# Patient Record
Sex: MALE | Race: WHITE | NOT HISPANIC OR LATINO | Employment: FULL TIME | URBAN - NONMETROPOLITAN AREA
[De-identification: names, ages, dates, MRNs, and addresses within clinical notes are randomized per-mention and may not be internally consistent; named-entity substitution may affect disease eponyms.]

---

## 2017-02-02 ENCOUNTER — HOSPITAL ENCOUNTER (OUTPATIENT)
Dept: GENERAL RADIOLOGY | Facility: HOSPITAL | Age: 62
Discharge: HOME | End: 2017-02-02
Admitting: FAMILY MEDICINE

## 2017-02-02 ENCOUNTER — TRANSCRIBE ORDERS (OUTPATIENT)
Dept: ADMINISTRATIVE | Facility: HOSPITAL | Age: 62
End: 2017-02-02

## 2017-02-02 DIAGNOSIS — M54.5 LOW BACK PAIN, UNSPECIFIED BACK PAIN LATERALITY, UNSPECIFIED CHRONICITY, WITH SCIATICA PRESENCE UNSPECIFIED: ICD-10-CM

## 2017-02-02 DIAGNOSIS — M54.5 LOW BACK PAIN, UNSPECIFIED BACK PAIN LATERALITY, UNSPECIFIED CHRONICITY, WITH SCIATICA PRESENCE UNSPECIFIED: Primary | ICD-10-CM

## 2017-02-02 PROCEDURE — 72110 X-RAY EXAM L-2 SPINE 4/>VWS: CPT

## 2018-04-27 ENCOUNTER — APPOINTMENT (OUTPATIENT)
Dept: PREADMISSION TESTING | Facility: HOSPITAL | Age: 63
End: 2018-04-27

## 2018-04-30 ENCOUNTER — APPOINTMENT (OUTPATIENT)
Dept: PREADMISSION TESTING | Facility: HOSPITAL | Age: 63
End: 2018-04-30

## 2018-04-30 VITALS
OXYGEN SATURATION: 98 % | WEIGHT: 187.61 LBS | HEART RATE: 82 BPM | HEIGHT: 64 IN | SYSTOLIC BLOOD PRESSURE: 128 MMHG | DIASTOLIC BLOOD PRESSURE: 76 MMHG | BODY MASS INDEX: 32.03 KG/M2

## 2018-04-30 LAB
ALBUMIN SERPL-MCNC: 4.1 G/DL (ref 3.5–5)
ALBUMIN/GLOB SERPL: 1.4 G/DL (ref 1.1–2.5)
ALP SERPL-CCNC: 71 U/L (ref 24–120)
ALT SERPL W P-5'-P-CCNC: 40 U/L (ref 0–54)
ANION GAP SERPL CALCULATED.3IONS-SCNC: 11 MMOL/L (ref 4–13)
AST SERPL-CCNC: 26 U/L (ref 7–45)
BASOPHILS # BLD AUTO: 0.04 10*3/MM3 (ref 0–0.2)
BASOPHILS NFR BLD AUTO: 0.9 % (ref 0–2)
BILIRUB SERPL-MCNC: 0.5 MG/DL (ref 0.1–1)
BUN BLD-MCNC: 12 MG/DL (ref 5–21)
BUN/CREAT SERPL: 9.9 (ref 7–25)
CALCIUM SPEC-SCNC: 9.2 MG/DL (ref 8.4–10.4)
CHLORIDE SERPL-SCNC: 97 MMOL/L (ref 98–110)
CO2 SERPL-SCNC: 30 MMOL/L (ref 24–31)
CREAT BLD-MCNC: 1.21 MG/DL (ref 0.5–1.4)
DEPRECATED RDW RBC AUTO: 42.9 FL (ref 40–54)
EOSINOPHIL # BLD AUTO: 0.2 10*3/MM3 (ref 0–0.7)
EOSINOPHIL NFR BLD AUTO: 4.3 % (ref 0–4)
ERYTHROCYTE [DISTWIDTH] IN BLOOD BY AUTOMATED COUNT: 13.7 % (ref 12–15)
GFR SERPL CREATININE-BSD FRML MDRD: 61 ML/MIN/1.73
GLOBULIN UR ELPH-MCNC: 2.9 GM/DL
GLUCOSE BLD-MCNC: 97 MG/DL (ref 70–100)
HCT VFR BLD AUTO: 41.5 % (ref 40–52)
HGB BLD-MCNC: 13.9 G/DL (ref 14–18)
IMM GRANULOCYTES # BLD: 0.02 10*3/MM3 (ref 0–0.03)
IMM GRANULOCYTES NFR BLD: 0.4 % (ref 0–5)
LYMPHOCYTES # BLD AUTO: 1.05 10*3/MM3 (ref 0.72–4.86)
LYMPHOCYTES NFR BLD AUTO: 22.7 % (ref 15–45)
MCH RBC QN AUTO: 28.8 PG (ref 28–32)
MCHC RBC AUTO-ENTMCNC: 33.5 G/DL (ref 33–36)
MCV RBC AUTO: 85.9 FL (ref 82–95)
MONOCYTES # BLD AUTO: 0.46 10*3/MM3 (ref 0.19–1.3)
MONOCYTES NFR BLD AUTO: 10 % (ref 4–12)
NEUTROPHILS # BLD AUTO: 2.85 10*3/MM3 (ref 1.87–8.4)
NEUTROPHILS NFR BLD AUTO: 61.7 % (ref 39–78)
NRBC BLD MANUAL-RTO: 0 /100 WBC (ref 0–0)
PLATELET # BLD AUTO: 317 10*3/MM3 (ref 130–400)
PMV BLD AUTO: 9.5 FL (ref 6–12)
POTASSIUM BLD-SCNC: 3.8 MMOL/L (ref 3.5–5.3)
PROT SERPL-MCNC: 7 G/DL (ref 6.3–8.7)
RBC # BLD AUTO: 4.83 10*6/MM3 (ref 4.8–5.9)
SODIUM BLD-SCNC: 138 MMOL/L (ref 135–145)
WBC NRBC COR # BLD: 4.62 10*3/MM3 (ref 4.8–10.8)

## 2018-04-30 PROCEDURE — 80053 COMPREHEN METABOLIC PANEL: CPT | Performed by: SPECIALIST

## 2018-04-30 PROCEDURE — 93010 ELECTROCARDIOGRAM REPORT: CPT | Performed by: INTERNAL MEDICINE

## 2018-04-30 PROCEDURE — 36415 COLL VENOUS BLD VENIPUNCTURE: CPT

## 2018-04-30 PROCEDURE — 93005 ELECTROCARDIOGRAM TRACING: CPT

## 2018-04-30 PROCEDURE — 85025 COMPLETE CBC W/AUTO DIFF WBC: CPT | Performed by: SPECIALIST

## 2018-04-30 RX ORDER — TIZANIDINE 4 MG/1
4 TABLET ORAL EVERY 8 HOURS PRN
COMMUNITY
End: 2022-11-09

## 2018-04-30 RX ORDER — RANITIDINE 300 MG/1
300 TABLET ORAL NIGHTLY
Status: ON HOLD | COMMUNITY
End: 2022-08-15

## 2018-04-30 RX ORDER — SUCRALFATE 1 G/1
1 TABLET ORAL 4 TIMES DAILY
Status: ON HOLD | COMMUNITY
End: 2022-08-15

## 2018-04-30 RX ORDER — PANTOPRAZOLE SODIUM 40 MG/1
40 TABLET, DELAYED RELEASE ORAL EVERY MORNING
COMMUNITY

## 2018-05-04 ENCOUNTER — ANESTHESIA (OUTPATIENT)
Dept: PERIOP | Facility: HOSPITAL | Age: 63
End: 2018-05-04

## 2018-05-04 ENCOUNTER — ANESTHESIA EVENT (OUTPATIENT)
Dept: PERIOP | Facility: HOSPITAL | Age: 63
End: 2018-05-04

## 2018-05-04 ENCOUNTER — HOSPITAL ENCOUNTER (OUTPATIENT)
Facility: HOSPITAL | Age: 63
Setting detail: HOSPITAL OUTPATIENT SURGERY
Discharge: HOME | End: 2018-05-04
Attending: SPECIALIST | Admitting: SPECIALIST

## 2018-05-04 VITALS
TEMPERATURE: 96.9 F | DIASTOLIC BLOOD PRESSURE: 77 MMHG | OXYGEN SATURATION: 93 % | SYSTOLIC BLOOD PRESSURE: 121 MMHG | RESPIRATION RATE: 14 BRPM | HEART RATE: 52 BPM

## 2018-05-04 PROCEDURE — 25010000002 NEOSTIGMINE PER 0.5 MG: Performed by: NURSE ANESTHETIST, CERTIFIED REGISTERED

## 2018-05-04 PROCEDURE — 25010000002 FENTANYL CITRATE (PF) 100 MCG/2ML SOLUTION: Performed by: NURSE ANESTHETIST, CERTIFIED REGISTERED

## 2018-05-04 PROCEDURE — 25010000002 MIDAZOLAM PER 1 MG: Performed by: ANESTHESIOLOGY

## 2018-05-04 PROCEDURE — 25010000002 DEXAMETHASONE PER 1 MG: Performed by: ANESTHESIOLOGY

## 2018-05-04 PROCEDURE — C1781 MESH (IMPLANTABLE): HCPCS | Performed by: SPECIALIST

## 2018-05-04 PROCEDURE — 25010000002 DEXAMETHASONE PER 1 MG: Performed by: NURSE ANESTHETIST, CERTIFIED REGISTERED

## 2018-05-04 PROCEDURE — 25010000002 PROPOFOL 10 MG/ML EMULSION: Performed by: NURSE ANESTHETIST, CERTIFIED REGISTERED

## 2018-05-04 PROCEDURE — 25010000002 KETOROLAC TROMETHAMINE PER 15 MG: Performed by: NURSE ANESTHETIST, CERTIFIED REGISTERED

## 2018-05-04 PROCEDURE — 25010000002 VANCOMYCIN PER 500 MG: Performed by: SPECIALIST

## 2018-05-04 PROCEDURE — 25010000002 ONDANSETRON PER 1 MG: Performed by: NURSE ANESTHETIST, CERTIFIED REGISTERED

## 2018-05-04 DEVICE — BARD VENTRALEX HERNIA PATCH, 4.3 CM (1.7"), SMALL CIRCLE WITH STRAP
Type: IMPLANTABLE DEVICE | Status: FUNCTIONAL
Brand: VENTRALEX

## 2018-05-04 RX ORDER — MIDAZOLAM HYDROCHLORIDE 1 MG/ML
2 INJECTION INTRAMUSCULAR; INTRAVENOUS
Status: DISCONTINUED | OUTPATIENT
Start: 2018-05-04 | End: 2018-05-04 | Stop reason: HOSPADM

## 2018-05-04 RX ORDER — PROPOFOL 10 MG/ML
VIAL (ML) INTRAVENOUS AS NEEDED
Status: DISCONTINUED | OUTPATIENT
Start: 2018-05-04 | End: 2018-05-04 | Stop reason: SURG

## 2018-05-04 RX ORDER — ACETAMINOPHEN 500 MG
1000 TABLET ORAL ONCE
Status: COMPLETED | OUTPATIENT
Start: 2018-05-04 | End: 2018-05-04

## 2018-05-04 RX ORDER — SODIUM CHLORIDE, SODIUM LACTATE, POTASSIUM CHLORIDE, CALCIUM CHLORIDE 600; 310; 30; 20 MG/100ML; MG/100ML; MG/100ML; MG/100ML
100 INJECTION, SOLUTION INTRAVENOUS CONTINUOUS
Status: DISCONTINUED | OUTPATIENT
Start: 2018-05-04 | End: 2018-05-04 | Stop reason: HOSPADM

## 2018-05-04 RX ORDER — ONDANSETRON 2 MG/ML
INJECTION INTRAMUSCULAR; INTRAVENOUS AS NEEDED
Status: DISCONTINUED | OUTPATIENT
Start: 2018-05-04 | End: 2018-05-04 | Stop reason: SURG

## 2018-05-04 RX ORDER — KETOROLAC TROMETHAMINE 30 MG/ML
INJECTION, SOLUTION INTRAMUSCULAR; INTRAVENOUS AS NEEDED
Status: DISCONTINUED | OUTPATIENT
Start: 2018-05-04 | End: 2018-05-04 | Stop reason: SURG

## 2018-05-04 RX ORDER — HYDROCODONE BITARTRATE AND ACETAMINOPHEN 7.5; 325 MG/1; MG/1
1 TABLET ORAL ONCE AS NEEDED
Status: DISCONTINUED | OUTPATIENT
Start: 2018-05-04 | End: 2018-05-04 | Stop reason: HOSPADM

## 2018-05-04 RX ORDER — IPRATROPIUM BROMIDE AND ALBUTEROL SULFATE 2.5; .5 MG/3ML; MG/3ML
3 SOLUTION RESPIRATORY (INHALATION) ONCE AS NEEDED
Status: DISCONTINUED | OUTPATIENT
Start: 2018-05-04 | End: 2018-05-04 | Stop reason: HOSPADM

## 2018-05-04 RX ORDER — LIDOCAINE HYDROCHLORIDE 40 MG/ML
SOLUTION TOPICAL AS NEEDED
Status: DISCONTINUED | OUTPATIENT
Start: 2018-05-04 | End: 2018-05-04 | Stop reason: SURG

## 2018-05-04 RX ORDER — SODIUM CHLORIDE 0.9 % (FLUSH) 0.9 %
1-10 SYRINGE (ML) INJECTION AS NEEDED
Status: DISCONTINUED | OUTPATIENT
Start: 2018-05-04 | End: 2018-05-04 | Stop reason: HOSPADM

## 2018-05-04 RX ORDER — METOCLOPRAMIDE HYDROCHLORIDE 5 MG/ML
5 INJECTION INTRAMUSCULAR; INTRAVENOUS
Status: DISCONTINUED | OUTPATIENT
Start: 2018-05-04 | End: 2018-05-04 | Stop reason: HOSPADM

## 2018-05-04 RX ORDER — SODIUM CHLORIDE, SODIUM LACTATE, POTASSIUM CHLORIDE, CALCIUM CHLORIDE 600; 310; 30; 20 MG/100ML; MG/100ML; MG/100ML; MG/100ML
1000 INJECTION, SOLUTION INTRAVENOUS CONTINUOUS
Status: DISCONTINUED | OUTPATIENT
Start: 2018-05-04 | End: 2018-05-04 | Stop reason: HOSPADM

## 2018-05-04 RX ORDER — BUPIVACAINE HYDROCHLORIDE AND EPINEPHRINE 5; 5 MG/ML; UG/ML
INJECTION, SOLUTION PERINEURAL AS NEEDED
Status: DISCONTINUED | OUTPATIENT
Start: 2018-05-04 | End: 2018-05-04 | Stop reason: HOSPADM

## 2018-05-04 RX ORDER — NALOXONE HCL 0.4 MG/ML
0.4 VIAL (ML) INJECTION AS NEEDED
Status: DISCONTINUED | OUTPATIENT
Start: 2018-05-04 | End: 2018-05-04 | Stop reason: HOSPADM

## 2018-05-04 RX ORDER — ROCURONIUM BROMIDE 10 MG/ML
INJECTION, SOLUTION INTRAVENOUS AS NEEDED
Status: DISCONTINUED | OUTPATIENT
Start: 2018-05-04 | End: 2018-05-04 | Stop reason: SURG

## 2018-05-04 RX ORDER — LIDOCAINE HYDROCHLORIDE 20 MG/ML
INJECTION, SOLUTION INFILTRATION; PERINEURAL AS NEEDED
Status: DISCONTINUED | OUTPATIENT
Start: 2018-05-04 | End: 2018-05-04 | Stop reason: SURG

## 2018-05-04 RX ORDER — GLYCOPYRROLATE 0.2 MG/ML
INJECTION INTRAMUSCULAR; INTRAVENOUS AS NEEDED
Status: DISCONTINUED | OUTPATIENT
Start: 2018-05-04 | End: 2018-05-04 | Stop reason: SURG

## 2018-05-04 RX ORDER — IBUPROFEN 600 MG/1
600 TABLET ORAL ONCE AS NEEDED
Status: DISCONTINUED | OUTPATIENT
Start: 2018-05-04 | End: 2018-05-04 | Stop reason: HOSPADM

## 2018-05-04 RX ORDER — DEXAMETHASONE SODIUM PHOSPHATE 4 MG/ML
4 INJECTION, SOLUTION INTRA-ARTICULAR; INTRALESIONAL; INTRAMUSCULAR; INTRAVENOUS; SOFT TISSUE ONCE AS NEEDED
Status: COMPLETED | OUTPATIENT
Start: 2018-05-04 | End: 2018-05-04

## 2018-05-04 RX ORDER — MIDAZOLAM HYDROCHLORIDE 1 MG/ML
1 INJECTION INTRAMUSCULAR; INTRAVENOUS
Status: DISCONTINUED | OUTPATIENT
Start: 2018-05-04 | End: 2018-05-04 | Stop reason: HOSPADM

## 2018-05-04 RX ORDER — OXYCODONE AND ACETAMINOPHEN 7.5; 325 MG/1; MG/1
2 TABLET ORAL ONCE AS NEEDED
Status: COMPLETED | OUTPATIENT
Start: 2018-05-04 | End: 2018-05-04

## 2018-05-04 RX ORDER — MEPERIDINE HYDROCHLORIDE 25 MG/ML
12.5 INJECTION INTRAMUSCULAR; INTRAVENOUS; SUBCUTANEOUS
Status: DISCONTINUED | OUTPATIENT
Start: 2018-05-04 | End: 2018-05-04 | Stop reason: HOSPADM

## 2018-05-04 RX ORDER — HYDROCODONE BITARTRATE AND ACETAMINOPHEN 7.5; 325 MG/1; MG/1
1-2 TABLET ORAL EVERY 4 HOURS PRN
Qty: 30 TABLET | Refills: 0 | Status: SHIPPED | OUTPATIENT
Start: 2018-05-04 | End: 2022-11-09

## 2018-05-04 RX ORDER — SODIUM CHLORIDE 0.9 % (FLUSH) 0.9 %
3 SYRINGE (ML) INJECTION AS NEEDED
Status: DISCONTINUED | OUTPATIENT
Start: 2018-05-04 | End: 2018-05-04 | Stop reason: HOSPADM

## 2018-05-04 RX ORDER — FENTANYL CITRATE 50 UG/ML
INJECTION, SOLUTION INTRAMUSCULAR; INTRAVENOUS AS NEEDED
Status: DISCONTINUED | OUTPATIENT
Start: 2018-05-04 | End: 2018-05-04 | Stop reason: SURG

## 2018-05-04 RX ORDER — LABETALOL HYDROCHLORIDE 5 MG/ML
5 INJECTION, SOLUTION INTRAVENOUS
Status: DISCONTINUED | OUTPATIENT
Start: 2018-05-04 | End: 2018-05-04 | Stop reason: HOSPADM

## 2018-05-04 RX ORDER — ONDANSETRON 2 MG/ML
4 INJECTION INTRAMUSCULAR; INTRAVENOUS ONCE AS NEEDED
Status: DISCONTINUED | OUTPATIENT
Start: 2018-05-04 | End: 2018-05-04 | Stop reason: HOSPADM

## 2018-05-04 RX ORDER — OXYCODONE AND ACETAMINOPHEN 10; 325 MG/1; MG/1
1 TABLET ORAL ONCE AS NEEDED
Status: DISCONTINUED | OUTPATIENT
Start: 2018-05-04 | End: 2018-05-04 | Stop reason: HOSPADM

## 2018-05-04 RX ORDER — FENTANYL CITRATE 50 UG/ML
25 INJECTION, SOLUTION INTRAMUSCULAR; INTRAVENOUS AS NEEDED
Status: DISCONTINUED | OUTPATIENT
Start: 2018-05-04 | End: 2018-05-04 | Stop reason: HOSPADM

## 2018-05-04 RX ORDER — DEXAMETHASONE SODIUM PHOSPHATE 4 MG/ML
INJECTION, SOLUTION INTRA-ARTICULAR; INTRALESIONAL; INTRAMUSCULAR; INTRAVENOUS; SOFT TISSUE AS NEEDED
Status: DISCONTINUED | OUTPATIENT
Start: 2018-05-04 | End: 2018-05-04 | Stop reason: SURG

## 2018-05-04 RX ADMIN — DEXAMETHASONE SODIUM PHOSPHATE 4 MG: 4 INJECTION, SOLUTION INTRA-ARTICULAR; INTRALESIONAL; INTRAMUSCULAR; INTRAVENOUS; SOFT TISSUE at 14:41

## 2018-05-04 RX ADMIN — ACETAMINOPHEN 1000 MG: 500 TABLET, FILM COATED ORAL at 14:40

## 2018-05-04 RX ADMIN — PROPOFOL 200 MG: 10 INJECTION, EMULSION INTRAVENOUS at 15:08

## 2018-05-04 RX ADMIN — GLYCOPYRROLATE 0.4 MG: 0.2 INJECTION, SOLUTION INTRAMUSCULAR; INTRAVENOUS at 15:32

## 2018-05-04 RX ADMIN — LIDOCAINE HYDROCHLORIDE 0.5 ML: 10 INJECTION, SOLUTION EPIDURAL; INFILTRATION; INTRACAUDAL; PERINEURAL at 11:41

## 2018-05-04 RX ADMIN — ONDANSETRON HYDROCHLORIDE 4 MG: 2 SOLUTION INTRAMUSCULAR; INTRAVENOUS at 15:08

## 2018-05-04 RX ADMIN — FENTANYL CITRATE 100 MCG: 50 INJECTION, SOLUTION INTRAMUSCULAR; INTRAVENOUS at 15:08

## 2018-05-04 RX ADMIN — DEXAMETHASONE SODIUM PHOSPHATE 4 MG: 4 INJECTION, SOLUTION INTRAMUSCULAR; INTRAVENOUS at 15:08

## 2018-05-04 RX ADMIN — KETOROLAC TROMETHAMINE 30 MG: 30 INJECTION, SOLUTION INTRAMUSCULAR at 15:34

## 2018-05-04 RX ADMIN — OXYCODONE AND ACETAMINOPHEN 2 TABLET: 7.5; 325 TABLET ORAL at 16:05

## 2018-05-04 RX ADMIN — Medication 1 G: at 15:17

## 2018-05-04 RX ADMIN — LIDOCAINE HYDROCHLORIDE 50 MG: 20 INJECTION, SOLUTION INFILTRATION; PERINEURAL at 15:08

## 2018-05-04 RX ADMIN — SODIUM CHLORIDE, POTASSIUM CHLORIDE, SODIUM LACTATE AND CALCIUM CHLORIDE 1000 ML: 600; 310; 30; 20 INJECTION, SOLUTION INTRAVENOUS at 11:41

## 2018-05-04 RX ADMIN — MIDAZOLAM HYDROCHLORIDE 2 MG: 1 INJECTION, SOLUTION INTRAMUSCULAR; INTRAVENOUS at 14:41

## 2018-05-04 RX ADMIN — LIDOCAINE HYDROCHLORIDE 1 EACH: 40 SOLUTION TOPICAL at 15:10

## 2018-05-04 RX ADMIN — Medication 3 MG: at 15:32

## 2018-05-04 RX ADMIN — ROCURONIUM BROMIDE 30 MG: 10 INJECTION INTRAVENOUS at 15:08

## 2018-05-04 NOTE — OP NOTE
UMBILICAL HERNIA REPAIR  Procedure Note    Andi Keene  5/4/2018    Pre-op Diagnosis:   UMBILICAL HERNIA    Post-op Diagnosis:     same    Procedure/CPT® Codes:      Procedure(s):  OPEN UMBILICAL HERNIA REPAIR WITH MESH    Surgeon(s):  Augusta Sherman MD    Anesthesia: General    Staff:   Circulator: Humberto Keen RN  Scrub Person: Liz Paul  Assistant: Monie Nelson    Estimated Blood Loss: minimal    Specimens:                None      Drains:      Findings: small fascial defect    Complications: none    Description of Procedure:  The patient was brought to the operating room and placed in the supine position. After the induction of general anesthesia and infusion of IV antibiotics, the patient was prepped and draped in the usual sterile fashion. A semicircular incision was made around the umbilicus.  Subcutaneous tissue was dissected with electrocautery down to the fascia.  The umbilical stalk and hernia were circumferentially dissected and isolated.  The umbilical stalk and hernia was amputated at the level of the fascia.  The contents of the hernia were reduced.  The hernia sac was debrided from the umbilicus.  The mesh was soaked in antibiotic solution, positioned underneath the fascia, and the fascia was closed using interrupted 0 Prolene incorporating bites of the tabs of the mesh. Marcaine  was injected into the closed fascia.  The umbilical stalk was tacked to the closed fascia with interrupted 2-0 Vicryl.  The skin was reapproximated 2-0 Vicryl and running 4-0 Vicryl subcuticular. Dressings were placed.  The patient was transferred to the recovery room in stable condition having tolerated the procedure well.  At the end of the procedure all counts were correct.    Augusta Sherman MD     Date: 5/4/2018  Time: 3:40 PM

## 2018-05-04 NOTE — ANESTHESIA PREPROCEDURE EVALUATION
Anesthesia Evaluation     Patient summary reviewed   no history of anesthetic complications:  NPO Solid Status: > 8 hours  NPO Liquid Status: > 8 hours           Airway   Mallampati: I  TM distance: >3 FB  Neck ROM: full  No difficulty expected  Dental    (+) edentulous    Pulmonary - normal exam    breath sounds clear to auscultation  (-) asthma, recent URI, sleep apnea, not a smoker  Cardiovascular - normal exam  Exercise tolerance: good (4-7 METS)    ECG reviewed  Rhythm: regular  Rate: normal    (+) hypertension,   (-) pacemaker, past MI, angina, cardiac stents, CABG      Neuro/Psych  (-) seizures, TIA, CVA  GI/Hepatic/Renal/Endo    (+)  GERD,    (-) liver disease, no renal disease, diabetes, hypothyroidism, hyperthyroidism    Musculoskeletal     Abdominal    Substance History      OB/GYN          Other                        Anesthesia Plan    ASA 2     general     intravenous induction   Anesthetic plan and risks discussed with patient.

## 2018-05-04 NOTE — ANESTHESIA POSTPROCEDURE EVALUATION
Patient: Andi Keene    Procedure Summary     Date:  05/04/18 Room / Location:   [facility] /  [facility]    Anesthesia Start:  1507 Anesthesia Stop:  1552    Procedure:  OPEN UMBILICAL HERNIA REPAIR WITH MESH (N/A Abdomen) Diagnosis:  (UMBILICAL HERNIA)    Surgeon:  Augusta Sherman MD Provider:  Tra Crandall CRNA    Anesthesia Type:  general ASA Status:  2          Anesthesia Type: general  Last vitals  BP   116/81 (05/04/18 1615)   Temp   96.9 °F (36.1 °C) (05/04/18 1554)   Pulse   68 (05/04/18 1615)   Resp   13 (05/04/18 1615)     SpO2   92 % (05/04/18 1605)     Post Anesthesia Care and Evaluation    Patient location during evaluation: PACU  Patient participation: complete - patient participated  Level of consciousness: awake and alert  Pain management: adequate  Airway patency: patent  Anesthetic complications: No anesthetic complications  PONV Status: controlled  Cardiovascular status: acceptable and hemodynamically stable  Respiratory status: acceptable  Hydration status: acceptable    Comments: Patient discharged from PACU prior to anesthesia evaluation based on Neno Score.  For details, see RN note.     /81   Pulse 68   Temp 96.9 °F (36.1 °C) (Temporal Artery )   Resp 13   SpO2 92%

## 2018-05-04 NOTE — ANESTHESIA PROCEDURE NOTES
Airway  Urgency: elective    Airway not difficult    General Information and Staff    Patient location during procedure: OR  CRNA: JAME MOLINA    Indications and Patient Condition  Indications for airway management: airway protection    Preoxygenated: yes  MILS maintained throughout  Mask difficulty assessment: 1 - vent by mask    Final Airway Details  Final airway type: endotracheal airway      Successful airway: ETT  Cuffed: yes   Successful intubation technique: direct laryngoscopy  Endotracheal tube insertion site: oral  Blade: De La Garza  Blade size: #2  ETT size: 7.5 mm  Cormack-Lehane Classification: grade I - full view of glottis  Placement verified by: chest auscultation and capnometry   Cuff volume (mL): 6  Measured from: lips  ETT to lips (cm): 22  Number of attempts at approach: 1

## 2018-05-04 NOTE — DISCHARGE INSTRUCTIONS
YOUR NEXT PAIN MEDICATION IS DUE AT___8:00  PM___         General Anesthesia, Adult, Care After  Refer to this sheet in the next few weeks. These instructions provide you with information on caring for yourself after your procedure. Your health care provider may also give you more specific instructions. Your treatment has been planned according to current medical practices, but problems sometimes occur. Call your health care provider if you have any problems or questions after your procedure.  WHAT TO EXPECT AFTER THE PROCEDURE  After the procedure, it is typical to experience:  · Sleepiness.  · Nausea and vomiting.  HOME CARE INSTRUCTIONS  · For the first 24 hours after general anesthesia:  ¨ Have a responsible person with you.  ¨ Do not drive a car. If you are alone, do not take public transportation.  ¨ Do not drink alcohol.  ¨ Do not take medicine that has not been prescribed by your health care provider.  ¨ Do not sign important papers or make important decisions.  ¨ You may resume a normal diet and activities as directed by your health care provider.  · Change bandages (dressings) as directed.  · If you have questions or problems that seem related to general anesthesia, call the hospital and ask for the anesthetist or anesthesiologist on call.  SEEK MEDICAL CARE IF:  · You have nausea and vomiting that continue the day after anesthesia.  · You develop a rash.  SEEK IMMEDIATE MEDICAL CARE IF:    · You have difficulty breathing.  · You have chest pain.  · You have any allergic problems.     This information is not intended to replace advice given to you by your health care provider. Make sure you discuss any questions you have with your health care provider.     Document Released: 03/26/2002 Document Revised: 01/08/2016 Document Reviewed: 07/03/2014  [employer] Interactive Patient Education ©2016 [employer]    CALL YOUR PHYSICIAN IF YOU EXPERIENCE  INCREASED PAIN NOT HELPED BY YOUR PAIN MEDICATION.    .                                               Fall Prevention in the Home      Falls can cause injuries. They can happen to people of all ages. There are many things you can do to make your home safe and to help prevent falls.    WHAT CAN I DO ON THE OUTSIDE OF MY HOME?  · Regularly fix the edges of walkways and driveways and fix any cracks.  · Remove anything that might make you trip as you walk through a door, such as a raised step or threshold.  · Trim any bushes or trees on the path to your home.  · Use bright outdoor lighting.  · Clear any walking paths of anything that might make someone trip, such as rocks or tools.  · Regularly check to see if handrails are loose or broken. Make sure that both sides of any steps have handrails.  · Any raised decks and porches should have guardrails on the edges.  · Have any leaves, snow, or ice cleared regularly.  · Use sand or salt on walking paths during winter.  · Clean up any spills in your garage right away. This includes oil or grease spills.  WHAT CAN I DO IN THE BATHROOM?    · Use night lights.  · Install grab bars by the toilet and in the tub and shower. Do not use towel bars as grab bars.  · Use non-skid mats or decals in the tub or shower.  · If you need to sit down in the shower, use a plastic, non-slip stool.  · Keep the floor dry. Clean up any water that spills on the floor as soon as it happens.  · Remove soap buildup in the tub or shower regularly.  · Attach bath mats securely with double-sided non-slip rug tape.  · Do not have throw rugs and other things on the floor that can make you trip.  WHAT CAN I DO IN THE BEDROOM?  · Use night lights.  · Make sure that you have a light by your bed that is easy to reach.  · Do not use any sheets or blankets that are too big for your bed. They should not hang down onto the floor.  · Have a firm chair that has side arms. You can use this for support while you get dressed.  · Do not have throw rugs and other things on the floor  that can make you trip.  WHAT CAN I DO IN THE KITCHEN?  · Clean up any spills right away.  · Avoid walking on wet floors.  · Keep items that you use a lot in easy-to-reach places.  · If you need to reach something above you, use a strong step stool that has a grab bar.  · Keep electrical cords out of the way.  · Do not use floor polish or wax that makes floors slippery. If you must use wax, use non-skid floor wax.  · Do not have throw rugs and other things on the floor that can make you trip.  WHAT CAN I DO WITH MY STAIRS?  · Do not leave any items on the stairs.  · Make sure that there are handrails on both sides of the stairs and use them. Fix handrails that are broken or loose. Make sure that handrails are as long as the stairways.  · Check any carpeting to make sure that it is firmly attached to the stairs. Fix any carpet that is loose or worn.  · Avoid having throw rugs at the top or bottom of the stairs. If you do have throw rugs, attach them to the floor with carpet tape.  · Make sure that you have a light switch at the top of the stairs and the bottom of the stairs. If you do not have them, ask someone to add them for you.  WHAT ELSE CAN I DO TO HELP PREVENT FALLS?  · Wear shoes that:  ¨ Do not have high heels.  ¨ Have rubber bottoms.  ¨ Are comfortable and fit you well.  ¨ Are closed at the toe. Do not wear sandals.  · If you use a stepladder:  ¨ Make sure that it is fully opened. Do not climb a closed stepladder.  ¨ Make sure that both sides of the stepladder are locked into place.  ¨ Ask someone to hold it for you, if possible.  · Clearly audrey and make sure that you can see:  ¨ Any grab bars or handrails.  ¨ First and last steps.  ¨ Where the edge of each step is.  · Use tools that help you move around (mobility aids) if they are needed. These include:  ¨ Canes.  ¨ Walkers.  ¨ Scooters.  ¨ Crutches.  · Turn on the lights when you go into a dark area. Replace any light bulbs as soon as they burn  out.  · Set up your furniture so you have a clear path. Avoid moving your furniture around.  · If any of your floors are uneven, fix them.  · If there are any pets around you, be aware of where they are.  · Review your medicines with your doctor. Some medicines can make you feel dizzy. This can increase your chance of falling.  Ask your doctor what other things that you can do to help prevent falls.     This information is not intended to replace advice given to you by your health care provider. Make sure you discuss any questions you have with your health care provider.     Document Released: 10/14/2010 Document Revised: 05/03/2016 Document Reviewed: 01/22/2016  [employer] Interactive Patient Education ©2016 [employer]     PATIENT/FAMILY/RESPONSIBLE PARTY VERBALIZES UNDERSTANDING OF ABOVE EDUCATION.  COPY OF PAIN SCALE GIVEN AND REVIEWED WITH VERBALIZED UNDERSTANDING.

## 2018-05-05 ENCOUNTER — HOSPITAL ENCOUNTER (EMERGENCY)
Facility: HOSPITAL | Age: 63
Discharge: HOME | End: 2018-05-05
Attending: EMERGENCY MEDICINE

## 2018-05-05 VITALS
BODY MASS INDEX: 32.44 KG/M2 | SYSTOLIC BLOOD PRESSURE: 123 MMHG | RESPIRATION RATE: 14 BRPM | TEMPERATURE: 97.1 F | HEIGHT: 64 IN | DIASTOLIC BLOOD PRESSURE: 65 MMHG | OXYGEN SATURATION: 100 % | WEIGHT: 190 LBS | HEART RATE: 50 BPM

## 2018-05-05 DIAGNOSIS — Z87.19 S/P LAPAROSCOPIC HERNIA REPAIR: ICD-10-CM

## 2018-05-05 DIAGNOSIS — G89.18 POST-OP PAIN: Primary | ICD-10-CM

## 2018-05-05 DIAGNOSIS — Z98.890 S/P LAPAROSCOPIC HERNIA REPAIR: ICD-10-CM

## 2018-05-05 NOTE — DISCHARGE INSTRUCTIONS
Pain Scale Information, Adult  A pain scale is a tool to help you describe your pain. A pain scale often uses pictures, numbers, or words. It can help you explain to your health care provider:  · What your pain feels like, such as dull, achy, throbbing, or sharp.  · Where pain is located in your body.  · How often you have pain.  Pain scales range from simple to complex. Which pain scale your health care provider uses depends on your condition. Some pain scales only measure pain intensity. These can be useful if the cause of your pain is known. Other pain scales measure more factors, including if you are able to do your usual activities and how the pain is affecting your mood. These scales are useful if you have long-term (chronic) pain.  How is a pain scale used?  A pain scale may be used in your health care provider's office or in the hospital. Pain scales for adults are usually in the form of a survey. Your health care provider will ask you the questions on the pain scale or have you fill out a form.  Your health care provider may also give you a pain scale to use at home. If you have chronic pain, you may use a pain scale for several weeks or months. Keeping a record of your pain symptoms helps your health care provider see how your pain changes over time. Your health care provider can use a pain scale rating to guide your treatment plan.  Why is it important to communicate about pain?  Being in pain can make you feel unwell and have negative feelings. It can interfere with your daily activities, such as work, school, hobbies, or relationships. Pain can be a sign you have a condition that needs to be treated. A pain scale can help you describe your pain so your health care provider has a better idea of what you are feeling and how to treat your condition.  What are some questions to ask my health care provider?  · How accurate are the results of this pain scale?  · How often should I use a pain  scale?  · What is causing my pain?  · How long will I need treatment?  · What are the risks of treatment with medicines?  · What other treatments can help?  · What if my pain does not go away with treatment?  · Should I keep a record of pain symptoms or pain scale results at home?  This information is not intended to replace advice given to you by your health care provider. Make sure you discuss any questions you have with your health care provider.  Document Released: 01/01/2017 Document Revised: 05/25/2017 Document Reviewed: 01/01/2017  [employer] Interactive Patient Education © 2017 [employer]  Nothing strenuous.  Follow-up with surgeon.  If symptoms increase or surgeries change return back to the ER.

## 2018-05-05 NOTE — ED PROVIDER NOTES
Subjective   62-year-old gentleman who presents to our facility via private vehicle with a complaint of bleeding at the surgical site.  Yesterday he underwent a laparoscopic hernia repair of the abdominal wall here at our hospital by Dr. white.  He states that after going home he noted moderate amount of blood on the dressing and this morning he noticed that the dressing was soaked with blood.  There is no nausea no fever good appetite he eat breakfast this morning before coming into the emergency room no change in his urine or bowel habits.  No chest pain or shortness of breath no significant abdominal pain.            History provided by:  Patient  History limited by:  Acuity of condition   used: No    Abdominal Cramping   Pain location:  Generalized  Pain quality: cramping    Pain severity:  Severe  Onset quality:  Sudden  Timing:  Constant  Progression:  Worsening  Chronicity:  Chronic  Context: alcohol use    Risk factors: alcohol abuse        Review of Systems   Constitutional: Positive for activity change.   HENT: Negative.    Eyes: Negative.    Respiratory: Negative.    Cardiovascular: Negative.    Gastrointestinal: Positive for abdominal pain.        The patient had complaint of bleeding to surgical site with cramping.   Endocrine: Negative.    Genitourinary: Negative.    Musculoskeletal: Negative.    Skin: Negative.    Allergic/Immunologic: Negative.    Neurological: Negative.    Hematological: Negative.    Psychiatric/Behavioral: Negative.        Past Medical History:   Diagnosis Date   • Anemia    • Diverticulosis    • GERD (gastroesophageal reflux disease)    • Hypertension    • Lumbar back pain        No Known Allergies    Past Surgical History:   Procedure Laterality Date   • COLONOSCOPY N/A 11/15/2016    Procedure: COLONOSCOPY WITH ANESTHESIA;  Surgeon: Orestes Sams DO;  Location: [facility] ENDOSCOPY;  Service:    • ENDOSCOPY N/A 11/14/2016    Procedure:  ESOPHAGOGASTRODUODENOSCOPY WITH ANESTHESIA;  Surgeon: Orestes Sams DO;  Location: [facility] ENDOSCOPY;  Service:    • HERNIA REPAIR     • REPLACEMENT TOTAL KNEE     • TONSILLECTOMY     • UMBILICAL HERNIA REPAIR N/A 5/4/2018    Procedure: OPEN UMBILICAL HERNIA REPAIR WITH MESH;  Surgeon: Augusta Sherman MD;  Location: [facility] OR;  Service: General       Family History   Problem Relation Age of Onset   • No Known Problems Mother    • Heart disease Father        Social History     Social History   • Marital status:      Social History Main Topics   • Smoking status: Former Smoker   • Smokeless tobacco: Never Used   • Alcohol use 0.6 oz/week     1 Cans of beer per week      Comment: every other day   • Drug use: No   • Sexual activity: Defer     Other Topics Concern   • Not on file           Objective   Physical Exam   Constitutional: He is oriented to person, place, and time. He appears well-developed and well-nourished.   HENT:   Head: Normocephalic and atraumatic.   Right Ear: External ear normal.   Left Ear: External ear normal.   Nose: Nose normal.   Mouth/Throat: Oropharynx is clear and moist.   Eyes: Conjunctivae and EOM are normal. Pupils are equal, round, and reactive to light.   Neck: Normal range of motion. Neck supple.   Cardiovascular: Normal rate, regular rhythm, normal heart sounds and intact distal pulses.    Pulmonary/Chest: Effort normal and breath sounds normal.   Abdominal: A hernia is present.   Genitourinary: Rectum normal, prostate normal and penis normal.   Musculoskeletal: Normal range of motion.   Neurological: He is alert and oriented to person, place, and time. He has normal reflexes.   Skin: Skin is warm and dry.   Which was site in the mid abdominal area inferior to the bellybutton there is a surgical dressing with Steri-Strips which I have not altered.  The cause which was a 4 x 4 over this area appears to be blood soaked.  There is no other drainage or discharge noted.    Psychiatric: He has a normal mood and affect. His behavior is normal. Judgment and thought content normal.   Nursing note and vitals reviewed.      Procedures           ED Course      Andi Keene #[id number]  (62 y.o. M) 45       Lab Results     None   Imaging Results     None   ECG Results     None             MDM  Number of Diagnoses or Management Options  Post-op pain: established and improving  S/P laparoscopic hernia repair: established and improving  Risk of Complications, Morbidity, and/or Mortality  Presenting problems: moderate  Diagnostic procedures: moderate  Management options: moderate    Critical Care  Total time providing critical care: 30-74 minutes    Patient Progress  Patient progress: stable        Final diagnoses:   Post-op pain   S/P laparoscopic hernia repair            José Antonio MCKEON MD  06/07/18 3097

## 2018-07-05 ENCOUNTER — HOSPITAL ENCOUNTER (EMERGENCY)
Facility: HOSPITAL | Age: 63
Discharge: HOME | End: 2018-07-05
Admitting: EMERGENCY MEDICINE

## 2018-07-05 ENCOUNTER — APPOINTMENT (OUTPATIENT)
Dept: GENERAL RADIOLOGY | Facility: HOSPITAL | Age: 63
End: 2018-07-05

## 2018-07-05 VITALS
HEIGHT: 66 IN | TEMPERATURE: 98.3 F | SYSTOLIC BLOOD PRESSURE: 127 MMHG | BODY MASS INDEX: 29.89 KG/M2 | RESPIRATION RATE: 18 BRPM | DIASTOLIC BLOOD PRESSURE: 88 MMHG | HEART RATE: 77 BPM | WEIGHT: 186 LBS | OXYGEN SATURATION: 99 %

## 2018-07-05 DIAGNOSIS — S61.211A LACERATION OF LEFT INDEX FINGER WITHOUT FOREIGN BODY WITHOUT DAMAGE TO NAIL, INITIAL ENCOUNTER: Primary | ICD-10-CM

## 2018-07-05 PROCEDURE — 73130 X-RAY EXAM OF HAND: CPT

## 2018-07-05 PROCEDURE — 80307 DRUG TEST PRSMV CHEM ANLYZR: CPT

## 2018-07-05 PROCEDURE — 99283 EMERGENCY DEPT VISIT LOW MDM: CPT

## 2018-07-05 RX ORDER — LIDOCAINE HYDROCHLORIDE 10 MG/ML
10 INJECTION, SOLUTION INFILTRATION; PERINEURAL ONCE
Status: DISCONTINUED | OUTPATIENT
Start: 2018-07-05 | End: 2018-07-05 | Stop reason: HOSPADM

## 2018-07-05 RX ADMIN — LIDOCAINE HYDROCHLORIDE: 20 JELLY TOPICAL at 14:23

## 2018-07-05 NOTE — DISCHARGE INSTRUCTIONS
Keep wound clean and dry  F/u with PCP in 7 days or return to the ER to have sutures removed  Return to the ER as needed    Laceration Care, Adult  A laceration is a cut that goes through all of the layers of the skin and into the tissue that is right under the skin. Some lacerations heal on their own. Others need to be closed with stitches (sutures), staples, skin adhesive strips, or skin glue. Proper laceration care minimizes the risk of infection and helps the laceration to heal better.  How is this treated?  If sutures or staples were used:  · Keep the wound clean and dry.  · If you were given a bandage (dressing), you should change it at least one time per day or as told by your health care provider. You should also change it if it becomes wet or dirty.  · Keep the wound completely dry for the first 24 hours or as told by your health care provider. After that time, you may shower or bathe. However, make sure that the wound is not soaked in water until after the sutures or staples have been removed.  · Clean the wound one time each day or as told by your health care provider:  ? Wash the wound with soap and water.  ? Rinse the wound with water to remove all soap.  ? Pat the wound dry with a clean towel. Do not rub the wound.  · After cleaning the wound, apply a thin layer of antibiotic ointment as told by your health care provider. This will help to prevent infection and keep the dressing from sticking to the wound.  · Have the sutures or staples removed as told by your health care provider.  If skin adhesive strips were used:  · Keep the wound clean and dry.  · If you were given a bandage (dressing), you should change it at least one time per day or as told by your health care provider. You should also change it if it becomes dirty or wet.  · Do not get the skin adhesive strips wet. You may shower or bathe, but be careful to keep the wound dry.  · If the wound gets wet, pat it dry with a clean towel. Do not rub  the wound.  · Skin adhesive strips fall off on their own. You may trim the strips as the wound heals. Do not remove skin adhesive strips that are still stuck to the wound. They will fall off in time.  If skin glue was used:  · Try to keep the wound dry, but you may briefly wet it in the shower or bath. Do not soak the wound in water, such as by swimming.  · After you have showered or bathed, gently pat the wound dry with a clean towel. Do not rub the wound.  · Do not do any activities that will make you sweat heavily until the skin glue has fallen off on its own.  · Do not apply liquid, cream, or ointment medicine to the wound while the skin glue is in place. Using those may loosen the film before the wound has healed.  · If you were given a bandage (dressing), you should change it at least one time per day or as told by your health care provider. You should also change it if it becomes dirty or wet.  · If a dressing is placed over the wound, be careful not to apply tape directly over the skin glue. Doing that may cause the glue to be pulled off before the wound has healed.  · Do not pick at the glue. The skin glue usually remains in place for 5-10 days, then it falls off of the skin.  General Instructions  · Take over-the-counter and prescription medicines only as told by your health care provider.  · If you were prescribed an antibiotic medicine or ointment, take or apply it as told by your doctor. Do not stop using it even if your condition improves.  · To help prevent scarring, make sure to cover your wound with sunscreen whenever you are outside after stitches are removed, after adhesive strips are removed, or when glue remains in place and the wound is healed. Make sure to wear a sunscreen of at least 30 SPF.  · Do not scratch or pick at the wound.  · Keep all follow-up visits as told by your health care provider. This is important.  · Check your wound every day for signs of infection. Watch for:  ? Redness,  swelling, or pain.  ? Fluid, blood, or pus.  · Raise (elevate) the injured area above the level of your heart while you are sitting or lying down, if possible.  Contact a health care provider if:  · You received a tetanus shot and you have swelling, severe pain, redness, or bleeding at the injection site.  · You have a fever.  · A wound that was closed breaks open.  · You notice a bad smell coming from your wound or your dressing.  · You notice something coming out of the wound, such as wood or glass.  · Your pain is not controlled with medicine.  · You have increased redness, swelling, or pain at the site of your wound.  · You have fluid, blood, or pus coming from your wound.  · You notice a change in the color of your skin near your wound.  · You need to change the dressing frequently due to fluid, blood, or pus draining from the wound.  · You develop a new rash.  · You develop numbness around the wound.  Get help right away if:  · You develop severe swelling around the wound.  · Your pain suddenly increases and is severe.  · You develop painful lumps near the wound or on skin that is anywhere on your body.  · You have a red streak going away from your wound.  · The wound is on your hand or foot and you cannot properly move a finger or toe.  · The wound is on your hand or foot and you notice that your fingers or toes look pale or bluish.  This information is not intended to replace advice given to you by your health care provider. Make sure you discuss any questions you have with your health care provider.  Document Released: 12/18/2006 Document Revised: 05/19/2017 Document Reviewed: 12/14/2015  [employer] Patient Education © 2018 [employer]

## 2018-07-06 ENCOUNTER — LAB REQUISITION (OUTPATIENT)
Dept: LAB | Facility: HOSPITAL | Age: 63
End: 2018-07-06

## 2018-07-06 DIAGNOSIS — Z00.00 ROUTINE GENERAL MEDICAL EXAMINATION AT A HEALTH CARE FACILITY: ICD-10-CM

## 2018-07-12 ENCOUNTER — HOSPITAL ENCOUNTER (EMERGENCY)
Facility: HOSPITAL | Age: 63
Discharge: HOME | End: 2018-07-12
Admitting: EMERGENCY MEDICINE

## 2018-07-12 VITALS
HEIGHT: 67 IN | OXYGEN SATURATION: 97 % | TEMPERATURE: 99 F | HEART RATE: 95 BPM | WEIGHT: 185 LBS | SYSTOLIC BLOOD PRESSURE: 102 MMHG | BODY MASS INDEX: 29.03 KG/M2 | RESPIRATION RATE: 18 BRPM | DIASTOLIC BLOOD PRESSURE: 53 MMHG

## 2018-07-12 DIAGNOSIS — Z48.02 VISIT FOR SUTURE REMOVAL: Primary | ICD-10-CM

## 2018-07-12 PROCEDURE — 99201: CPT

## 2018-07-12 NOTE — ED PROVIDER NOTES
Subjective   62-year-old presents to the emergency department for suture removal of his left thumb.  Patient had Lasix left thumb one week ago.  Patient denies any competitions.  No fever, no chills, no signs of infection per patient patient reports no exacerbating or alleviating factors at this time.            Review of Systems   Constitutional: Negative for chills, diaphoresis, fatigue and fever.   HENT: Negative for congestion and trouble swallowing.    Respiratory: Negative for shortness of breath and wheezing.    Cardiovascular: Negative for chest pain and palpitations.   Gastrointestinal: Negative for abdominal pain, diarrhea and nausea.   Genitourinary: Negative for dysuria.   Musculoskeletal: Negative for arthralgias and myalgias.   Skin: Positive for wound (suture removal left thumb).   Neurological: Negative for dizziness and numbness.   Hematological: Negative for adenopathy. Does not bruise/bleed easily.       Past Medical History:   Diagnosis Date   • Anemia    • Diverticulosis    • GERD (gastroesophageal reflux disease)    • Hypertension    • Lumbar back pain        No Known Allergies    Past Surgical History:   Procedure Laterality Date   • COLONOSCOPY N/A 11/15/2016    Procedure: COLONOSCOPY WITH ANESTHESIA;  Surgeon: Orestes Sams DO;  Location: [facility] ENDOSCOPY;  Service:    • ENDOSCOPY N/A 11/14/2016    Procedure: ESOPHAGOGASTRODUODENOSCOPY WITH ANESTHESIA;  Surgeon: Orestes Sams DO;  Location: [facility] ENDOSCOPY;  Service:    • HERNIA REPAIR     • REPLACEMENT TOTAL KNEE     • TONSILLECTOMY     • UMBILICAL HERNIA REPAIR N/A 5/4/2018    Procedure: OPEN UMBILICAL HERNIA REPAIR WITH MESH;  Surgeon: Augusta Sherman MD;  Location: [facility] OR;  Service: General       Family History   Problem Relation Age of Onset   • No Known Problems Mother    • Heart disease Father        Social History     Social History   • Marital status:      Social History Main Topics   • Smoking status: Former  "Smoker   • Smokeless tobacco: Never Used   • Alcohol use 0.6 oz/week     1 Cans of beer per week      Comment: every other day   • Drug use: No   • Sexual activity: Defer     Other Topics Concern   • Not on file       Lab Results (last 24 hours)     ** No results found for the last 24 hours. **          Objective   Physical Exam   Constitutional: He is oriented to person, place, and time. He appears well-developed and well-nourished.   HENT:   Head: Normocephalic and atraumatic.   Cardiovascular: Normal rate and regular rhythm.    Pulmonary/Chest: Effort normal and breath sounds normal.   Abdominal: Soft. Bowel sounds are normal.   Musculoskeletal: Normal range of motion.   Neurological: He is oriented to person, place, and time.   Skin: Skin is warm and dry. Capillary refill takes less than 2 seconds.        3 sutures on left proximal thumb.  No signs of infection, incision healing well.   Psychiatric: His behavior is normal.   Nursing note and vitals reviewed.      Procedures         No orders to display       /53 (Patient Position: Sitting)   Pulse 95   Temp 99 °F (37.2 °C) (Temporal Artery )   Resp 18   Ht 170.2 cm (67\")   Wt 83.9 kg (185 lb)   SpO2 97%   BMI 28.98 kg/m²     ED Course         Medications - No data to display         MDM  Number of Diagnoses or Management Options  Diagnosis management comments: Encounter for suture removal.  3 sutures removed.  Patient tolerated procedure well.    Risk of Complications, Morbidity, and/or Mortality  Presenting problems: minimal  Diagnostic procedures: minimal  Management options: minimal    Patient Progress  Patient progress: improved      Final diagnoses:   Visit for suture removal          Devon Sahu PA-C  07/12/18 1555    "

## 2018-07-12 NOTE — DISCHARGE INSTRUCTIONS
Return to ER for any new, worsening or other concerning symptoms.  Otherwise follow with her primary care provider as needed.

## 2018-07-30 NOTE — ED PROVIDER NOTES
Subjective     History provided by:  Patient   used: No    Laceration   Location:  Finger  Finger laceration location:  L thumb  Length:  1  Depth:  Cutaneous  Quality: straight    Time since incident:  1 hour  Laceration mechanism:  Knife  Pain details:     Quality:  Aching    Severity:  Mild    Timing:  Constant    Progression:  Unchanged  Relieved by:  Nothing  Worsened by:  Nothing  Ineffective treatments:  None tried  Tetanus status:  Up to date  Associated symptoms: no fever, no focal weakness, no numbness, no rash, no redness, no swelling and no streaking        Review of Systems   Constitutional: Negative for fever.   Skin: Positive for wound. Negative for rash.   Neurological: Negative for focal weakness.   All other systems reviewed and are negative.      Past Medical History:   Diagnosis Date   • Anemia    • Diverticulosis    • GERD (gastroesophageal reflux disease)    • Hypertension    • Lumbar back pain        No Known Allergies    Past Surgical History:   Procedure Laterality Date   • COLONOSCOPY N/A 11/15/2016    Procedure: COLONOSCOPY WITH ANESTHESIA;  Surgeon: Orestes Sams DO;  Location: [facility] ENDOSCOPY;  Service:    • ENDOSCOPY N/A 11/14/2016    Procedure: ESOPHAGOGASTRODUODENOSCOPY WITH ANESTHESIA;  Surgeon: Orestes Sams DO;  Location: [facility] ENDOSCOPY;  Service:    • HERNIA REPAIR     • REPLACEMENT TOTAL KNEE     • TONSILLECTOMY     • UMBILICAL HERNIA REPAIR N/A 5/4/2018    Procedure: OPEN UMBILICAL HERNIA REPAIR WITH MESH;  Surgeon: Augusta Sherman MD;  Location: [facility] OR;  Service: General       Family History   Problem Relation Age of Onset   • No Known Problems Mother    • Heart disease Father        Social History     Social History   • Marital status:      Social History Main Topics   • Smoking status: Former Smoker   • Smokeless tobacco: Never Used   • Alcohol use 0.6 oz/week     1 Cans of beer per week      Comment: every other day   • Drug use: No      • Sexual activity: Defer     Other Topics Concern   • Not on file           Objective   Physical Exam   Constitutional: He is oriented to person, place, and time. He appears well-developed and well-nourished.   Cardiovascular: Normal rate.    Pulmonary/Chest: Effort normal.   Musculoskeletal:        Arms:  Neurological: He is alert and oriented to person, place, and time.   Skin: Skin is warm and dry. Capillary refill takes less than 2 seconds.   Psychiatric: He has a normal mood and affect.   Nursing note and vitals reviewed.      Laceration Repair  Date/Time: 7/30/2018 7:21 AM  Performed by: KYLE DE LA CRUZ  Authorized by: KYLE DE LA CRUZ     Consent:     Consent obtained:  Verbal    Consent given by:  Patient    Risks discussed:  Infection, need for additional repair, nerve damage, pain, poor cosmetic result, poor wound healing, retained foreign body, tendon damage and vascular damage    Alternatives discussed:  No treatment  Anesthesia (see MAR for exact dosages):     Anesthesia method:  Topical application and local infiltration    Topical anesthetic:  Lidocaine gel    Local anesthetic:  Lidocaine 1% w/o epi  Laceration details:     Location:  Finger    Finger location:  L thumb    Length (cm):  1  Repair type:     Repair type:  Simple  Pre-procedure details:     Preparation:  Patient was prepped and draped in usual sterile fashion and imaging obtained to evaluate for foreign bodies  Exploration:     Wound exploration: wound explored through full range of motion and entire depth of wound probed and visualized      Wound extent: no areolar tissue violation noted, no fascia violation noted, no foreign bodies/material noted, no muscle damage noted, no nerve damage noted, no tendon damage noted, no underlying fracture noted and no vascular damage noted      Contaminated: no    Treatment:     Area cleansed with:  Hibiclens and saline    Amount of cleaning:  Standard  Skin repair:     Repair method:   Sutures    Suture size:  5-0    Suture material:  Nylon    Number of sutures:  4  Approximation:     Approximation:  Loose  Post-procedure details:     Dressing:  Antibiotic ointment and bulky dressing    Patient tolerance of procedure:  Tolerated well, no immediate complications               ED Course  ED Course as of Jul 30 0725 Mon Jul 30, 2018   0722 Lac repair completed; pt piotr well. Pt advised to keep wound clean and dry, have sutures removed in 5-7 days, return to the ER as needed. Pt will be DC home in stable cond.  [LF]      ED Course User Index  [LF] SONAM Wilde        XR Hand 3+ View Left   Final Result   No acute findings.       This report was finalized on 07/05/2018 14:47 by Dr. Rober Duvall MD.                  MDM  Number of Diagnoses or Management Options  Laceration of left index finger without foreign body without damage to nail, initial encounter: new and requires workup     Amount and/or Complexity of Data Reviewed  Tests in the radiology section of CPT®: ordered and reviewed    Patient Progress  Patient progress: stable        Final diagnoses:   Laceration of left index finger without foreign body without damage to nail, initial encounter            SONAM Wilde  07/30/18 6011

## 2022-08-11 ENCOUNTER — OFFICE VISIT (OUTPATIENT)
Dept: GASTROENTEROLOGY | Facility: CLINIC | Age: 67
End: 2022-08-11

## 2022-08-11 VITALS
OXYGEN SATURATION: 98 % | HEART RATE: 94 BPM | BODY MASS INDEX: 25.9 KG/M2 | TEMPERATURE: 98.3 F | SYSTOLIC BLOOD PRESSURE: 136 MMHG | DIASTOLIC BLOOD PRESSURE: 70 MMHG | HEIGHT: 67 IN | WEIGHT: 165 LBS

## 2022-08-11 DIAGNOSIS — R10.30 LOWER ABDOMINAL PAIN: Primary | ICD-10-CM

## 2022-08-11 PROCEDURE — 99204 OFFICE O/P NEW MOD 45 MIN: CPT | Performed by: INTERNAL MEDICINE

## 2022-08-11 RX ORDER — MULTIPLE VITAMINS W/ MINERALS TAB 9MG-400MCG
1 TAB ORAL DAILY
COMMUNITY

## 2022-08-11 NOTE — H&P (VIEW-ONLY)
Chief Complaint   Patient presents with   • Abdominal Pain     Lower abd. Pain lots of gas       PCP: Peter Keene Jr., MD  REFER: Ashlee Lira AP*    Subjective     HPI    The patient presents with complains of lower abdominal stomach cramping associated with gas.  Symptoms intermittent x one year.  Symptoms do not occur at particular time of day.  No changes in bowel habit.  No signs of GI blood loss.  He has experienced bright red blood per rectum in past and has attributed this to having diverticulosis.  Bleeding described as small amounts and may go 6-12 months without observing blood.    Colonoscopy 2016.      Wt Readings from Last 3 Encounters:   08/11/22 74.8 kg (165 lb)   07/12/18 83.9 kg (185 lb)   07/05/18 84.4 kg (186 lb)         Past Medical History:   Diagnosis Date   • Anemia    • Diverticulosis    • GERD (gastroesophageal reflux disease)    • Hypertension    • Lumbar back pain        Past Surgical History:   Procedure Laterality Date   • COLONOSCOPY N/A 11/15/2016    Procedure: COLONOSCOPY WITH ANESTHESIA;  Surgeon: Orestes Sams DO;  Location: [facility] ENDOSCOPY;  Service:    • ENDOSCOPY N/A 11/14/2016    Procedure: ESOPHAGOGASTRODUODENOSCOPY WITH ANESTHESIA;  Surgeon: Orestes Sams DO;  Location: [facility] ENDOSCOPY;  Service:    • HERNIA REPAIR     • REPLACEMENT TOTAL KNEE     • TONSILLECTOMY     • UMBILICAL HERNIA REPAIR N/A 5/4/2018    Procedure: OPEN UMBILICAL HERNIA REPAIR WITH MESH;  Surgeon: Augusta Sherman MD;  Location: [facility] OR;  Service: General       Outpatient Medications Marked as Taking for the 8/11/22 encounter (Office Visit) with Orestes Sams DO   Medication Sig Dispense Refill   • ferrous sulfate 324 (65 FE) MG tablet delayed-release EC tablet Take 324 mg by mouth Daily With Breakfast.     • HYDROcodone-acetaminophen (NORCO) 7.5-325 MG per tablet Take 1-2 tablets by mouth Every 4 (Four) Hours As Needed for Moderate Pain  (Pain). 30 tablet 0   • loratadine  "(CLARITIN) 10 MG tablet Take 10 mg by mouth Daily.     • losartan-hydrochlorothiazide (HYZAAR) 100-25 MG per tablet Take 1 tablet by mouth Daily.     • Methylcellulose, Laxative, (FIBER THERAPY PO) Take  by mouth 6 (Six) Times a Day.     • multivitamin with minerals (CENTRUM SILVER 50+MEN PO) Take 1 tablet by mouth Daily.     • nabumetone (RELAFEN) 500 MG tablet Take 500 mg by mouth 2 (Two) Times a Day As Needed for Mild Pain .     • pantoprazole (PROTONIX) 40 MG EC tablet Take 40 mg by mouth Every Morning.     • raNITIdine (ZANTAC) 300 MG tablet Take 300 mg by mouth Every Night.     • sucralfate (CARAFATE) 1 g tablet Take 1 g by mouth 4 (Four) Times a Day.     • tiZANidine (ZANAFLEX) 4 MG tablet Take 4 mg by mouth Every 8 (Eight) Hours As Needed for Muscle Spasms.         No Known Allergies    Social History     Socioeconomic History   • Marital status:    Tobacco Use   • Smoking status: Former Smoker   • Smokeless tobacco: Never Used   Vaping Use   • Vaping Use: Never used   Substance and Sexual Activity   • Alcohol use: Yes     Alcohol/week: 1.0 standard drink     Types: 1 Cans of beer per week     Comment: occ.   • Drug use: No   • Sexual activity: Defer       Review of Systems   Constitutional: Negative for unexpected weight change.   Respiratory: Negative for shortness of breath.    Cardiovascular: Negative for chest pain.   Gastrointestinal: Positive for abdominal pain. Negative for anal bleeding.       Objective     Vitals:    08/11/22 1345   BP: 136/70   Pulse: 94   Temp: 98.3 °F (36.8 °C)   SpO2: 98%   Weight: 74.8 kg (165 lb)   Height: 170.2 cm (67\")     Body mass index is 25.84 kg/m².    Physical Exam  Constitutional:       Appearance: Normal appearance. He is well-developed.   Eyes:      General: No scleral icterus.  Neck:      Thyroid: No thyroid mass or thyromegaly.      Vascular: No JVD.   Pulmonary:      Effort: Pulmonary effort is normal. No accessory muscle usage.   Abdominal:      General: " There is no distension.      Tenderness: There is no abdominal tenderness. There is no guarding.   Skin:     Coloration: Skin is not jaundiced.   Neurological:      Mental Status: He is alert.   Psychiatric:         Behavior: Behavior is cooperative.         Judgment: Judgment normal.         Imaging Results (Most Recent)     None          Body mass index is 25.84 kg/m².    Assessment & Plan     Diagnoses and all orders for this visit:    1. Lower abdominal pain (Primary)  -     Case Request; Standing  -     Case Request        COLONOSCOPY WITH ANESTHESIA (N/A)    miralax prep    Advised pt to stop use of NSAIDs, Fish Oil, and MV 5 days prior to procedure, per Dr Sams protocol.  Tylenol based products are ok to take.  Pt verbalized understanding.        Orestes Sams, DO  08/11/22          There are no Patient Instructions on file for this visit.

## 2022-08-11 NOTE — PROGRESS NOTES
Chief Complaint   Patient presents with   • Abdominal Pain     Lower abd. Pain lots of gas       PCP: Peter Keene Jr., MD  REFER: Ashlee Lira AP*    Subjective     HPI    The patient presents with complains of lower abdominal stomach cramping associated with gas.  Symptoms intermittent x one year.  Symptoms do not occur at particular time of day.  No changes in bowel habit.  No signs of GI blood loss.  He has experienced bright red blood per rectum in past and has attributed this to having diverticulosis.  Bleeding described as small amounts and may go 6-12 months without observing blood.    Colonoscopy 2016.      Wt Readings from Last 3 Encounters:   08/11/22 74.8 kg (165 lb)   07/12/18 83.9 kg (185 lb)   07/05/18 84.4 kg (186 lb)         Past Medical History:   Diagnosis Date   • Anemia    • Diverticulosis    • GERD (gastroesophageal reflux disease)    • Hypertension    • Lumbar back pain        Past Surgical History:   Procedure Laterality Date   • COLONOSCOPY N/A 11/15/2016    Procedure: COLONOSCOPY WITH ANESTHESIA;  Surgeon: Orestes Sams DO;  Location: [facility] ENDOSCOPY;  Service:    • ENDOSCOPY N/A 11/14/2016    Procedure: ESOPHAGOGASTRODUODENOSCOPY WITH ANESTHESIA;  Surgeon: Orestes Sams DO;  Location: [facility] ENDOSCOPY;  Service:    • HERNIA REPAIR     • REPLACEMENT TOTAL KNEE     • TONSILLECTOMY     • UMBILICAL HERNIA REPAIR N/A 5/4/2018    Procedure: OPEN UMBILICAL HERNIA REPAIR WITH MESH;  Surgeon: Augusta Sherman MD;  Location: [facility] OR;  Service: General       Outpatient Medications Marked as Taking for the 8/11/22 encounter (Office Visit) with Orestes Sams DO   Medication Sig Dispense Refill   • ferrous sulfate 324 (65 FE) MG tablet delayed-release EC tablet Take 324 mg by mouth Daily With Breakfast.     • HYDROcodone-acetaminophen (NORCO) 7.5-325 MG per tablet Take 1-2 tablets by mouth Every 4 (Four) Hours As Needed for Moderate Pain  (Pain). 30 tablet 0   • loratadine  "(CLARITIN) 10 MG tablet Take 10 mg by mouth Daily.     • losartan-hydrochlorothiazide (HYZAAR) 100-25 MG per tablet Take 1 tablet by mouth Daily.     • Methylcellulose, Laxative, (FIBER THERAPY PO) Take  by mouth 6 (Six) Times a Day.     • multivitamin with minerals (CENTRUM SILVER 50+MEN PO) Take 1 tablet by mouth Daily.     • nabumetone (RELAFEN) 500 MG tablet Take 500 mg by mouth 2 (Two) Times a Day As Needed for Mild Pain .     • pantoprazole (PROTONIX) 40 MG EC tablet Take 40 mg by mouth Every Morning.     • raNITIdine (ZANTAC) 300 MG tablet Take 300 mg by mouth Every Night.     • sucralfate (CARAFATE) 1 g tablet Take 1 g by mouth 4 (Four) Times a Day.     • tiZANidine (ZANAFLEX) 4 MG tablet Take 4 mg by mouth Every 8 (Eight) Hours As Needed for Muscle Spasms.         No Known Allergies    Social History     Socioeconomic History   • Marital status:    Tobacco Use   • Smoking status: Former Smoker   • Smokeless tobacco: Never Used   Vaping Use   • Vaping Use: Never used   Substance and Sexual Activity   • Alcohol use: Yes     Alcohol/week: 1.0 standard drink     Types: 1 Cans of beer per week     Comment: occ.   • Drug use: No   • Sexual activity: Defer       Review of Systems   Constitutional: Negative for unexpected weight change.   Respiratory: Negative for shortness of breath.    Cardiovascular: Negative for chest pain.   Gastrointestinal: Positive for abdominal pain. Negative for anal bleeding.       Objective     Vitals:    08/11/22 1345   BP: 136/70   Pulse: 94   Temp: 98.3 °F (36.8 °C)   SpO2: 98%   Weight: 74.8 kg (165 lb)   Height: 170.2 cm (67\")     Body mass index is 25.84 kg/m².    Physical Exam  Constitutional:       Appearance: Normal appearance. He is well-developed.   Eyes:      General: No scleral icterus.  Neck:      Thyroid: No thyroid mass or thyromegaly.      Vascular: No JVD.   Pulmonary:      Effort: Pulmonary effort is normal. No accessory muscle usage.   Abdominal:      General: " There is no distension.      Tenderness: There is no abdominal tenderness. There is no guarding.   Skin:     Coloration: Skin is not jaundiced.   Neurological:      Mental Status: He is alert.   Psychiatric:         Behavior: Behavior is cooperative.         Judgment: Judgment normal.         Imaging Results (Most Recent)     None          Body mass index is 25.84 kg/m².    Assessment & Plan     Diagnoses and all orders for this visit:    1. Lower abdominal pain (Primary)  -     Case Request; Standing  -     Case Request        COLONOSCOPY WITH ANESTHESIA (N/A)    miralax prep    Advised pt to stop use of NSAIDs, Fish Oil, and MV 5 days prior to procedure, per Dr Sams protocol.  Tylenol based products are ok to take.  Pt verbalized understanding.        Orestes Sams, DO  08/11/22          There are no Patient Instructions on file for this visit.

## 2022-08-15 ENCOUNTER — ANESTHESIA EVENT (OUTPATIENT)
Dept: GASTROENTEROLOGY | Facility: HOSPITAL | Age: 67
End: 2022-08-15

## 2022-08-15 ENCOUNTER — TELEPHONE (OUTPATIENT)
Dept: GASTROENTEROLOGY | Facility: CLINIC | Age: 67
End: 2022-08-15

## 2022-08-15 ENCOUNTER — HOSPITAL ENCOUNTER (OUTPATIENT)
Facility: HOSPITAL | Age: 67
Setting detail: HOSPITAL OUTPATIENT SURGERY
Discharge: HOME | End: 2022-08-15
Attending: INTERNAL MEDICINE | Admitting: INTERNAL MEDICINE

## 2022-08-15 ENCOUNTER — ANESTHESIA (OUTPATIENT)
Dept: GASTROENTEROLOGY | Facility: HOSPITAL | Age: 67
End: 2022-08-15

## 2022-08-15 VITALS
HEART RATE: 88 BPM | SYSTOLIC BLOOD PRESSURE: 118 MMHG | DIASTOLIC BLOOD PRESSURE: 79 MMHG | OXYGEN SATURATION: 100 % | RESPIRATION RATE: 17 BRPM

## 2022-08-15 DIAGNOSIS — R10.30 LOWER ABDOMINAL PAIN: ICD-10-CM

## 2022-08-15 PROCEDURE — 45378 DIAGNOSTIC COLONOSCOPY: CPT | Performed by: INTERNAL MEDICINE

## 2022-08-15 PROCEDURE — 25010000002 PROPOFOL 10 MG/ML EMULSION

## 2022-08-15 RX ORDER — SODIUM CHLORIDE 0.9 % (FLUSH) 0.9 %
10 SYRINGE (ML) INJECTION AS NEEDED
Status: DISCONTINUED | OUTPATIENT
Start: 2022-08-15 | End: 2022-08-15 | Stop reason: HOSPADM

## 2022-08-15 RX ORDER — PROPOFOL 10 MG/ML
VIAL (ML) INTRAVENOUS AS NEEDED
Status: DISCONTINUED | OUTPATIENT
Start: 2022-08-15 | End: 2022-08-15 | Stop reason: SURG

## 2022-08-15 RX ORDER — LIDOCAINE HYDROCHLORIDE 20 MG/ML
INJECTION, SOLUTION EPIDURAL; INFILTRATION; INTRACAUDAL; PERINEURAL AS NEEDED
Status: DISCONTINUED | OUTPATIENT
Start: 2022-08-15 | End: 2022-08-15 | Stop reason: SURG

## 2022-08-15 RX ORDER — LIDOCAINE HYDROCHLORIDE 10 MG/ML
0.5 INJECTION, SOLUTION EPIDURAL; INFILTRATION; INTRACAUDAL; PERINEURAL ONCE AS NEEDED
Status: DISCONTINUED | OUTPATIENT
Start: 2022-08-15 | End: 2022-08-15 | Stop reason: HOSPADM

## 2022-08-15 RX ORDER — SODIUM CHLORIDE 9 MG/ML
500 INJECTION, SOLUTION INTRAVENOUS CONTINUOUS PRN
Status: DISCONTINUED | OUTPATIENT
Start: 2022-08-15 | End: 2022-08-15 | Stop reason: HOSPADM

## 2022-08-15 RX ADMIN — PROPOFOL 250 MG: 10 INJECTION, EMULSION INTRAVENOUS at 10:04

## 2022-08-15 RX ADMIN — SODIUM CHLORIDE 500 ML: 9 INJECTION, SOLUTION INTRAVENOUS at 09:41

## 2022-08-15 RX ADMIN — LIDOCAINE HYDROCHLORIDE 100 MG: 20 INJECTION, SOLUTION EPIDURAL; INFILTRATION; INTRACAUDAL; PERINEURAL at 10:04

## 2022-08-15 NOTE — ANESTHESIA PREPROCEDURE EVALUATION
Anesthesia Evaluation     no history of anesthetic complications:  NPO Solid Status: > 8 hours  NPO Liquid Status: > 2 hours           Airway   Mallampati: I  TM distance: >3 FB  Neck ROM: full  No difficulty expected  Dental      Pulmonary    (-) sleep apnea, not a smoker  Cardiovascular   Exercise tolerance: good (4-7 METS)    (+) hypertension,   (-) CAD      Neuro/Psych  (-) seizures, TIA, CVA  GI/Hepatic/Renal/Endo    (+)  GERD,    (-) liver disease, no renal disease, diabetes    Musculoskeletal     Abdominal    Substance History      OB/GYN          Other                        Anesthesia Plan    ASA 2     MAC     intravenous induction     Anesthetic plan, risks, benefits, and alternatives have been provided, discussed and informed consent has been obtained with: patient.        CODE STATUS:

## 2022-08-15 NOTE — ANESTHESIA POSTPROCEDURE EVALUATION
Patient: Andi TREVIÑO    Procedure Summary     Date: 08/15/22 Room / Location: [facility] ENDOSCOPY 5 / [facility]    Anesthesia Start: 1001 Anesthesia Stop: 1018    Procedure: COLONOSCOPY WITH ANESTHESIA (N/A ) Diagnosis:       Lower abdominal pain      (Lower abdominal pain [R10.30])    Surgeons: Orestes Sams DO Provider: Cj Yarbrough CRNA    Anesthesia Type: MAC ASA Status: 2          Anesthesia Type: MAC    Vitals  Vitals Value Taken Time   /79 08/15/22 1031   Temp     Pulse 69 08/15/22 1033   Resp 17 08/15/22 1030   SpO2 99 % 08/15/22 1033   Vitals shown include unvalidated device data.        Post Anesthesia Care and Evaluation    Patient location during evaluation: PHASE II  Patient participation: complete - patient participated  Level of consciousness: awake  Pain management: adequate    Airway patency: patent  Anesthetic complications: No anesthetic complications    Cardiovascular status: acceptable  Respiratory status: acceptable  Hydration status: acceptable    Comments: /79   Pulse 88   Resp 17   SpO2 100%

## 2022-11-09 ENCOUNTER — HOSPITAL ENCOUNTER (OUTPATIENT)
Dept: GENERAL RADIOLOGY | Facility: HOSPITAL | Age: 67
Discharge: HOME | End: 2022-11-09

## 2022-11-09 ENCOUNTER — APPOINTMENT (OUTPATIENT)
Dept: CT IMAGING | Facility: HOSPITAL | Age: 67
DRG: 378 | End: 2022-11-09
Payer: MEDICARE

## 2022-11-09 ENCOUNTER — HOSPITAL ENCOUNTER (INPATIENT)
Facility: HOSPITAL | Age: 67
LOS: 2 days | Discharge: HOME | DRG: 378 | End: 2022-11-11
Attending: STUDENT IN AN ORGANIZED HEALTH CARE EDUCATION/TRAINING PROGRAM | Admitting: FAMILY MEDICINE
Payer: MEDICARE

## 2022-11-09 DIAGNOSIS — N17.9 ACUTE KIDNEY INJURY: ICD-10-CM

## 2022-11-09 DIAGNOSIS — K57.92 ACUTE DIVERTICULITIS: Primary | ICD-10-CM

## 2022-11-09 DIAGNOSIS — R10.32 ABDOMINAL PAIN, ACUTE, LEFT LOWER QUADRANT: ICD-10-CM

## 2022-11-09 DIAGNOSIS — D64.9 ACUTE ANEMIA: ICD-10-CM

## 2022-11-09 DIAGNOSIS — K59.09 CHRONIC CONSTIPATION: ICD-10-CM

## 2022-11-09 PROBLEM — M54.9 BACK PAIN: Status: ACTIVE | Noted: 2022-11-09

## 2022-11-09 PROBLEM — D50.9 MICROCYTIC ANEMIA: Status: ACTIVE | Noted: 2022-11-09

## 2022-11-09 PROBLEM — M89.9 LYTIC BONE LESIONS ON XRAY: Status: ACTIVE | Noted: 2022-11-09

## 2022-11-09 PROBLEM — K76.9 LIVER LESION: Status: ACTIVE | Noted: 2022-11-09

## 2022-11-09 LAB
ABO GROUP BLD: NORMAL
ALBUMIN SERPL-MCNC: 4 G/DL (ref 3.5–5.2)
ALBUMIN/GLOB SERPL: 1.3 G/DL
ALP SERPL-CCNC: 74 U/L (ref 39–117)
ALT SERPL W P-5'-P-CCNC: 8 U/L (ref 1–41)
ANION GAP SERPL CALCULATED.3IONS-SCNC: 10 MMOL/L (ref 5–15)
AST SERPL-CCNC: 13 U/L (ref 1–40)
BASOPHILS # BLD AUTO: 0.02 10*3/MM3 (ref 0–0.2)
BASOPHILS # BLD AUTO: 0.03 10*3/MM3 (ref 0–0.2)
BASOPHILS NFR BLD AUTO: 0.2 % (ref 0–1.5)
BASOPHILS NFR BLD AUTO: 0.3 % (ref 0–1.5)
BILIRUB SERPL-MCNC: 0.2 MG/DL (ref 0–1.2)
BILIRUB UR QL STRIP: NEGATIVE
BLD GP AB SCN SERPL QL: NEGATIVE
BUN SERPL-MCNC: 21 MG/DL (ref 8–23)
BUN/CREAT SERPL: 12.3 (ref 7–25)
CALCIUM SPEC-SCNC: 8.7 MG/DL (ref 8.6–10.5)
CHLORIDE SERPL-SCNC: 97 MMOL/L (ref 98–107)
CLARITY UR: CLEAR
CO2 SERPL-SCNC: 28 MMOL/L (ref 22–29)
COLOR UR: YELLOW
CREAT SERPL-MCNC: 1.71 MG/DL (ref 0.76–1.27)
D-LACTATE SERPL-SCNC: 0.8 MMOL/L (ref 0.5–2)
DEPRECATED RDW RBC AUTO: 39.8 FL (ref 37–54)
DEPRECATED RDW RBC AUTO: 40.3 FL (ref 37–54)
DEVELOPER EXPIRATION DATE: NORMAL
DEVELOPER LOT NUMBER: 225
EGFRCR SERPLBLD CKD-EPI 2021: 43.1 ML/MIN/1.73
EOSINOPHIL # BLD AUTO: 0.14 10*3/MM3 (ref 0–0.4)
EOSINOPHIL # BLD AUTO: 0.16 10*3/MM3 (ref 0–0.4)
EOSINOPHIL NFR BLD AUTO: 1.5 % (ref 0.3–6.2)
EOSINOPHIL NFR BLD AUTO: 1.5 % (ref 0.3–6.2)
ERYTHROCYTE [DISTWIDTH] IN BLOOD BY AUTOMATED COUNT: 14.2 % (ref 12.3–15.4)
ERYTHROCYTE [DISTWIDTH] IN BLOOD BY AUTOMATED COUNT: 14.2 % (ref 12.3–15.4)
EXPIRATION DATE: NORMAL
FECAL OCCULT BLOOD SCREEN, POC: NEGATIVE
FERRITIN SERPL-MCNC: 15.88 NG/ML (ref 30–400)
GLOBULIN UR ELPH-MCNC: 3 GM/DL
GLUCOSE SERPL-MCNC: 113 MG/DL (ref 65–99)
GLUCOSE UR STRIP-MCNC: NEGATIVE MG/DL
HCT VFR BLD AUTO: 20.6 % (ref 37.5–51)
HCT VFR BLD AUTO: 21.1 % (ref 37.5–51)
HGB BLD-MCNC: 6.4 G/DL (ref 13–17.7)
HGB BLD-MCNC: 6.5 G/DL (ref 13–17.7)
HGB UR QL STRIP.AUTO: NEGATIVE
HOLD SPECIMEN: NORMAL
HOLD SPECIMEN: NORMAL
IMM GRANULOCYTES # BLD AUTO: 0.04 10*3/MM3 (ref 0–0.05)
IMM GRANULOCYTES # BLD AUTO: 0.04 10*3/MM3 (ref 0–0.05)
IMM GRANULOCYTES NFR BLD AUTO: 0.4 % (ref 0–0.5)
IMM GRANULOCYTES NFR BLD AUTO: 0.4 % (ref 0–0.5)
IRON 24H UR-MRATE: 9 MCG/DL (ref 59–158)
IRON SATN MFR SERPL: 3 % (ref 20–50)
KETONES UR QL STRIP: NEGATIVE
LDH SERPL-CCNC: 154 U/L (ref 135–225)
LEUKOCYTE ESTERASE UR QL STRIP.AUTO: NEGATIVE
LIPASE SERPL-CCNC: 11 U/L (ref 13–60)
LYMPHOCYTES # BLD AUTO: 0.69 10*3/MM3 (ref 0.7–3.1)
LYMPHOCYTES # BLD AUTO: 0.7 10*3/MM3 (ref 0.7–3.1)
LYMPHOCYTES NFR BLD AUTO: 6.4 % (ref 19.6–45.3)
LYMPHOCYTES NFR BLD AUTO: 7.6 % (ref 19.6–45.3)
Lab: 225
MCH RBC QN AUTO: 24 PG (ref 26.6–33)
MCH RBC QN AUTO: 24 PG (ref 26.6–33)
MCHC RBC AUTO-ENTMCNC: 30.8 G/DL (ref 31.5–35.7)
MCHC RBC AUTO-ENTMCNC: 31.1 G/DL (ref 31.5–35.7)
MCV RBC AUTO: 77.2 FL (ref 79–97)
MCV RBC AUTO: 77.9 FL (ref 79–97)
MONOCYTES # BLD AUTO: 0.7 10*3/MM3 (ref 0.1–0.9)
MONOCYTES # BLD AUTO: 1 10*3/MM3 (ref 0.1–0.9)
MONOCYTES NFR BLD AUTO: 7.6 % (ref 5–12)
MONOCYTES NFR BLD AUTO: 9.3 % (ref 5–12)
NEGATIVE CONTROL: NEGATIVE
NEUTROPHILS NFR BLD AUTO: 7.61 10*3/MM3 (ref 1.7–7)
NEUTROPHILS NFR BLD AUTO: 8.85 10*3/MM3 (ref 1.7–7)
NEUTROPHILS NFR BLD AUTO: 82.1 % (ref 42.7–76)
NEUTROPHILS NFR BLD AUTO: 82.7 % (ref 42.7–76)
NITRITE UR QL STRIP: NEGATIVE
NRBC BLD AUTO-RTO: 0 /100 WBC (ref 0–0.2)
NRBC BLD AUTO-RTO: 0 /100 WBC (ref 0–0.2)
PH UR STRIP.AUTO: 5.5 [PH] (ref 5–8)
PLATELET # BLD AUTO: 518 10*3/MM3 (ref 140–450)
PLATELET # BLD AUTO: 527 10*3/MM3 (ref 140–450)
PMV BLD AUTO: 8.6 FL (ref 6–12)
PMV BLD AUTO: 8.7 FL (ref 6–12)
POSITIVE CONTROL: POSITIVE
POTASSIUM SERPL-SCNC: 3.9 MMOL/L (ref 3.5–5.2)
PROT SERPL-MCNC: 7 G/DL (ref 6–8.5)
PROT UR QL STRIP: NEGATIVE
RBC # BLD AUTO: 2.67 10*6/MM3 (ref 4.14–5.8)
RBC # BLD AUTO: 2.71 10*6/MM3 (ref 4.14–5.8)
RETICS # AUTO: 0.04 10*6/MM3 (ref 0.02–0.13)
RETICS/RBC NFR AUTO: 1.68 % (ref 0.7–1.9)
RH BLD: POSITIVE
SODIUM SERPL-SCNC: 135 MMOL/L (ref 136–145)
SP GR UR STRIP: 1.01 (ref 1–1.03)
T&S EXPIRATION DATE: NORMAL
TIBC SERPL-MCNC: 295 MCG/DL (ref 298–536)
TRANSFERRIN SERPL-MCNC: 198 MG/DL (ref 200–360)
UROBILINOGEN UR QL STRIP: NORMAL
WBC NRBC COR # BLD: 10.77 10*3/MM3 (ref 3.4–10.8)
WBC NRBC COR # BLD: 9.21 10*3/MM3 (ref 3.4–10.8)
WHOLE BLOOD HOLD COAG: NORMAL
WHOLE BLOOD HOLD SPECIMEN: NORMAL

## 2022-11-09 PROCEDURE — 83540 ASSAY OF IRON: CPT | Performed by: NURSE PRACTITIONER

## 2022-11-09 PROCEDURE — 82270 OCCULT BLOOD FECES: CPT | Performed by: STUDENT IN AN ORGANIZED HEALTH CARE EDUCATION/TRAINING PROGRAM

## 2022-11-09 PROCEDURE — 82378 CARCINOEMBRYONIC ANTIGEN: CPT | Performed by: NURSE PRACTITIONER

## 2022-11-09 PROCEDURE — 51798 US URINE CAPACITY MEASURE: CPT

## 2022-11-09 PROCEDURE — 74018 RADEX ABDOMEN 1 VIEW: CPT

## 2022-11-09 PROCEDURE — 86900 BLOOD TYPING SEROLOGIC ABO: CPT

## 2022-11-09 PROCEDURE — 84466 ASSAY OF TRANSFERRIN: CPT | Performed by: NURSE PRACTITIONER

## 2022-11-09 PROCEDURE — 83690 ASSAY OF LIPASE: CPT | Performed by: STUDENT IN AN ORGANIZED HEALTH CARE EDUCATION/TRAINING PROGRAM

## 2022-11-09 PROCEDURE — 99285 EMERGENCY DEPT VISIT HI MDM: CPT

## 2022-11-09 PROCEDURE — 82728 ASSAY OF FERRITIN: CPT | Performed by: NURSE PRACTITIONER

## 2022-11-09 PROCEDURE — 81003 URINALYSIS AUTO W/O SCOPE: CPT | Performed by: EMERGENCY MEDICINE

## 2022-11-09 PROCEDURE — 82607 VITAMIN B-12: CPT | Performed by: NURSE PRACTITIONER

## 2022-11-09 PROCEDURE — 74176 CT ABD & PELVIS W/O CONTRAST: CPT

## 2022-11-09 PROCEDURE — P9016 RBC LEUKOCYTES REDUCED: HCPCS

## 2022-11-09 PROCEDURE — 80053 COMPREHEN METABOLIC PANEL: CPT | Performed by: STUDENT IN AN ORGANIZED HEALTH CARE EDUCATION/TRAINING PROGRAM

## 2022-11-09 PROCEDURE — 86900 BLOOD TYPING SEROLOGIC ABO: CPT | Performed by: STUDENT IN AN ORGANIZED HEALTH CARE EDUCATION/TRAINING PROGRAM

## 2022-11-09 PROCEDURE — 25010000002 LEVOFLOXACIN PER 250 MG: Performed by: NURSE PRACTITIONER

## 2022-11-09 PROCEDURE — 86923 COMPATIBILITY TEST ELECTRIC: CPT

## 2022-11-09 PROCEDURE — 25010000002 NA FERRIC GLUC CPLX PER 12.5 MG: Performed by: NURSE PRACTITIONER

## 2022-11-09 PROCEDURE — 83615 LACTATE (LD) (LDH) ENZYME: CPT | Performed by: NURSE PRACTITIONER

## 2022-11-09 PROCEDURE — G0103 PSA SCREENING: HCPCS | Performed by: NURSE PRACTITIONER

## 2022-11-09 PROCEDURE — 36415 COLL VENOUS BLD VENIPUNCTURE: CPT

## 2022-11-09 PROCEDURE — 36430 TRANSFUSION BLD/BLD COMPNT: CPT

## 2022-11-09 PROCEDURE — 51702 INSERT TEMP BLADDER CATH: CPT

## 2022-11-09 PROCEDURE — 83605 ASSAY OF LACTIC ACID: CPT | Performed by: STUDENT IN AN ORGANIZED HEALTH CARE EDUCATION/TRAINING PROGRAM

## 2022-11-09 PROCEDURE — 85045 AUTOMATED RETICULOCYTE COUNT: CPT | Performed by: NURSE PRACTITIONER

## 2022-11-09 PROCEDURE — 85025 COMPLETE CBC W/AUTO DIFF WBC: CPT | Performed by: STUDENT IN AN ORGANIZED HEALTH CARE EDUCATION/TRAINING PROGRAM

## 2022-11-09 PROCEDURE — 86901 BLOOD TYPING SEROLOGIC RH(D): CPT | Performed by: STUDENT IN AN ORGANIZED HEALTH CARE EDUCATION/TRAINING PROGRAM

## 2022-11-09 PROCEDURE — 87040 BLOOD CULTURE FOR BACTERIA: CPT | Performed by: STUDENT IN AN ORGANIZED HEALTH CARE EDUCATION/TRAINING PROGRAM

## 2022-11-09 PROCEDURE — 86850 RBC ANTIBODY SCREEN: CPT | Performed by: STUDENT IN AN ORGANIZED HEALTH CARE EDUCATION/TRAINING PROGRAM

## 2022-11-09 RX ORDER — SODIUM CHLORIDE 9 MG/ML
75 INJECTION, SOLUTION INTRAVENOUS CONTINUOUS
Status: DISCONTINUED | OUTPATIENT
Start: 2022-11-09 | End: 2022-11-11 | Stop reason: HOSPADM

## 2022-11-09 RX ORDER — SODIUM CHLORIDE 0.9 % (FLUSH) 0.9 %
10 SYRINGE (ML) INJECTION EVERY 12 HOURS SCHEDULED
Status: DISCONTINUED | OUTPATIENT
Start: 2022-11-09 | End: 2022-11-11 | Stop reason: HOSPADM

## 2022-11-09 RX ORDER — METRONIDAZOLE 500 MG/100ML
500 INJECTION, SOLUTION INTRAVENOUS EVERY 8 HOURS
Status: DISCONTINUED | OUTPATIENT
Start: 2022-11-10 | End: 2022-11-11 | Stop reason: HOSPADM

## 2022-11-09 RX ORDER — ONDANSETRON 2 MG/ML
4 INJECTION INTRAMUSCULAR; INTRAVENOUS EVERY 6 HOURS PRN
Status: DISCONTINUED | OUTPATIENT
Start: 2022-11-09 | End: 2022-11-11 | Stop reason: HOSPADM

## 2022-11-09 RX ORDER — NITROGLYCERIN 0.4 MG/1
0.4 TABLET SUBLINGUAL
Status: DISCONTINUED | OUTPATIENT
Start: 2022-11-09 | End: 2022-11-11 | Stop reason: HOSPADM

## 2022-11-09 RX ORDER — CYCLOBENZAPRINE HCL 5 MG
5 TABLET ORAL 3 TIMES DAILY PRN
Status: DISCONTINUED | OUTPATIENT
Start: 2022-11-09 | End: 2022-11-11 | Stop reason: HOSPADM

## 2022-11-09 RX ORDER — HYDROCODONE BITARTRATE AND ACETAMINOPHEN 5; 325 MG/1; MG/1
1 TABLET ORAL EVERY 6 HOURS PRN
Status: DISCONTINUED | OUTPATIENT
Start: 2022-11-09 | End: 2022-11-11 | Stop reason: HOSPADM

## 2022-11-09 RX ORDER — TEMAZEPAM 15 MG/1
15 CAPSULE ORAL NIGHTLY PRN
Status: DISCONTINUED | OUTPATIENT
Start: 2022-11-09 | End: 2022-11-11 | Stop reason: HOSPADM

## 2022-11-09 RX ORDER — SODIUM CHLORIDE 0.9 % (FLUSH) 0.9 %
10 SYRINGE (ML) INJECTION AS NEEDED
Status: DISCONTINUED | OUTPATIENT
Start: 2022-11-09 | End: 2022-11-11 | Stop reason: HOSPADM

## 2022-11-09 RX ORDER — LEVOFLOXACIN 5 MG/ML
750 INJECTION, SOLUTION INTRAVENOUS
Status: DISCONTINUED | OUTPATIENT
Start: 2022-11-09 | End: 2022-11-10

## 2022-11-09 RX ORDER — METRONIDAZOLE 500 MG/100ML
500 INJECTION, SOLUTION INTRAVENOUS ONCE
Status: COMPLETED | OUTPATIENT
Start: 2022-11-09 | End: 2022-11-09

## 2022-11-09 RX ORDER — PANTOPRAZOLE SODIUM 40 MG/1
40 TABLET, DELAYED RELEASE ORAL EVERY MORNING
Status: DISCONTINUED | OUTPATIENT
Start: 2022-11-10 | End: 2022-11-11 | Stop reason: HOSPADM

## 2022-11-09 RX ORDER — ACETAMINOPHEN 325 MG/1
650 TABLET ORAL EVERY 4 HOURS PRN
Status: DISCONTINUED | OUTPATIENT
Start: 2022-11-09 | End: 2022-11-11 | Stop reason: HOSPADM

## 2022-11-09 RX ADMIN — SODIUM CHLORIDE 75 ML/HR: 9 INJECTION, SOLUTION INTRAVENOUS at 23:35

## 2022-11-09 RX ADMIN — Medication 10 ML: at 22:34

## 2022-11-09 RX ADMIN — SODIUM CHLORIDE 250 MG: 9 INJECTION, SOLUTION INTRAVENOUS at 23:35

## 2022-11-09 RX ADMIN — METRONIDAZOLE 500 MG: 500 INJECTION, SOLUTION INTRAVENOUS at 20:49

## 2022-11-09 RX ADMIN — SODIUM CHLORIDE, POTASSIUM CHLORIDE, SODIUM LACTATE AND CALCIUM CHLORIDE 1000 ML: 600; 310; 30; 20 INJECTION, SOLUTION INTRAVENOUS at 20:31

## 2022-11-09 RX ADMIN — LEVOFLOXACIN 750 MG: 5 INJECTION, SOLUTION INTRAVENOUS at 22:30

## 2022-11-10 PROBLEM — K92.2 GIB (GASTROINTESTINAL BLEEDING): Status: ACTIVE | Noted: 2022-11-10

## 2022-11-10 LAB
ALBUMIN SERPL-MCNC: 3.3 G/DL (ref 3.5–5.2)
ALBUMIN/GLOB SERPL: 1.4 G/DL
ALP SERPL-CCNC: 65 U/L (ref 39–117)
ALT SERPL W P-5'-P-CCNC: 7 U/L (ref 1–41)
ANION GAP SERPL CALCULATED.3IONS-SCNC: 12 MMOL/L (ref 5–15)
AST SERPL-CCNC: 11 U/L (ref 1–40)
BASOPHILS # BLD AUTO: 0.02 10*3/MM3 (ref 0–0.2)
BASOPHILS NFR BLD AUTO: 0.3 % (ref 0–1.5)
BILIRUB SERPL-MCNC: 0.4 MG/DL (ref 0–1.2)
BUN SERPL-MCNC: 14 MG/DL (ref 8–23)
BUN/CREAT SERPL: 11.6 (ref 7–25)
CALCIUM SPEC-SCNC: 8.4 MG/DL (ref 8.6–10.5)
CEA SERPL-MCNC: 2.61 NG/ML
CHLORIDE SERPL-SCNC: 101 MMOL/L (ref 98–107)
CO2 SERPL-SCNC: 24 MMOL/L (ref 22–29)
CREAT SERPL-MCNC: 1.21 MG/DL (ref 0.76–1.27)
DEPRECATED RDW RBC AUTO: 43.2 FL (ref 37–54)
EGFRCR SERPLBLD CKD-EPI 2021: 65.2 ML/MIN/1.73
EOSINOPHIL # BLD AUTO: 0.17 10*3/MM3 (ref 0–0.4)
EOSINOPHIL NFR BLD AUTO: 2.6 % (ref 0.3–6.2)
ERYTHROCYTE [DISTWIDTH] IN BLOOD BY AUTOMATED COUNT: 14.9 % (ref 12.3–15.4)
GLOBULIN UR ELPH-MCNC: 2.4 GM/DL
GLUCOSE SERPL-MCNC: 92 MG/DL (ref 65–99)
HCT VFR BLD AUTO: 22.2 % (ref 37.5–51)
HGB BLD-MCNC: 7 G/DL (ref 13–17.7)
IMM GRANULOCYTES # BLD AUTO: 0.02 10*3/MM3 (ref 0–0.05)
IMM GRANULOCYTES NFR BLD AUTO: 0.3 % (ref 0–0.5)
LYMPHOCYTES # BLD AUTO: 0.59 10*3/MM3 (ref 0.7–3.1)
LYMPHOCYTES NFR BLD AUTO: 8.9 % (ref 19.6–45.3)
MCH RBC QN AUTO: 25 PG (ref 26.6–33)
MCHC RBC AUTO-ENTMCNC: 31.5 G/DL (ref 31.5–35.7)
MCV RBC AUTO: 79.3 FL (ref 79–97)
MONOCYTES # BLD AUTO: 0.84 10*3/MM3 (ref 0.1–0.9)
MONOCYTES NFR BLD AUTO: 12.7 % (ref 5–12)
NEUTROPHILS NFR BLD AUTO: 4.98 10*3/MM3 (ref 1.7–7)
NEUTROPHILS NFR BLD AUTO: 75.2 % (ref 42.7–76)
NRBC BLD AUTO-RTO: 0 /100 WBC (ref 0–0.2)
PLATELET # BLD AUTO: 465 10*3/MM3 (ref 140–450)
PMV BLD AUTO: 9.1 FL (ref 6–12)
POTASSIUM SERPL-SCNC: 3.6 MMOL/L (ref 3.5–5.2)
PROT SERPL-MCNC: 5.7 G/DL (ref 6–8.5)
PSA SERPL-MCNC: 1.53 NG/ML (ref 0–4)
RBC # BLD AUTO: 2.8 10*6/MM3 (ref 4.14–5.8)
SODIUM SERPL-SCNC: 137 MMOL/L (ref 136–145)
VIT B12 BLD-MCNC: >2000 PG/ML (ref 211–946)
WBC NRBC COR # BLD: 6.62 10*3/MM3 (ref 3.4–10.8)

## 2022-11-10 PROCEDURE — 36430 TRANSFUSION BLD/BLD COMPNT: CPT

## 2022-11-10 PROCEDURE — 85025 COMPLETE CBC W/AUTO DIFF WBC: CPT | Performed by: FAMILY MEDICINE

## 2022-11-10 PROCEDURE — 86900 BLOOD TYPING SEROLOGIC ABO: CPT

## 2022-11-10 PROCEDURE — P9016 RBC LEUKOCYTES REDUCED: HCPCS

## 2022-11-10 PROCEDURE — 25010000002 LEVOFLOXACIN PER 250 MG: Performed by: FAMILY MEDICINE

## 2022-11-10 PROCEDURE — 36415 COLL VENOUS BLD VENIPUNCTURE: CPT | Performed by: FAMILY MEDICINE

## 2022-11-10 PROCEDURE — 82272 OCCULT BLD FECES 1-3 TESTS: CPT | Performed by: NURSE PRACTITIONER

## 2022-11-10 PROCEDURE — 80053 COMPREHEN METABOLIC PANEL: CPT | Performed by: FAMILY MEDICINE

## 2022-11-10 PROCEDURE — 25010000002 NA FERRIC GLUC CPLX PER 12.5 MG: Performed by: FAMILY MEDICINE

## 2022-11-10 RX ORDER — CYCLOBENZAPRINE HCL 5 MG
5 TABLET ORAL 2 TIMES DAILY PRN
COMMUNITY

## 2022-11-10 RX ORDER — BISACODYL 10 MG
10 SUPPOSITORY, RECTAL RECTAL NIGHTLY PRN
Status: DISCONTINUED | OUTPATIENT
Start: 2022-11-10 | End: 2022-11-11 | Stop reason: HOSPADM

## 2022-11-10 RX ORDER — SUCRALFATE 1 G/1
2 TABLET ORAL
Status: DISCONTINUED | OUTPATIENT
Start: 2022-11-10 | End: 2022-11-11 | Stop reason: HOSPADM

## 2022-11-10 RX ORDER — LEVOFLOXACIN 5 MG/ML
750 INJECTION, SOLUTION INTRAVENOUS EVERY 24 HOURS
Status: DISCONTINUED | OUTPATIENT
Start: 2022-11-10 | End: 2022-11-11 | Stop reason: HOSPADM

## 2022-11-10 RX ADMIN — SUCRALFATE 2 G: 1 TABLET ORAL at 18:21

## 2022-11-10 RX ADMIN — HYDROCODONE BITARTRATE AND ACETAMINOPHEN 1 TABLET: 5; 325 TABLET ORAL at 21:08

## 2022-11-10 RX ADMIN — BISACODYL 10 MG: 10 SUPPOSITORY RECTAL at 21:08

## 2022-11-10 RX ADMIN — SODIUM CHLORIDE 75 ML/HR: 9 INJECTION, SOLUTION INTRAVENOUS at 16:05

## 2022-11-10 RX ADMIN — Medication 10 ML: at 21:08

## 2022-11-10 RX ADMIN — METRONIDAZOLE 500 MG: 500 INJECTION, SOLUTION INTRAVENOUS at 12:26

## 2022-11-10 RX ADMIN — TEMAZEPAM 15 MG: 15 CAPSULE ORAL at 21:08

## 2022-11-10 RX ADMIN — METRONIDAZOLE 500 MG: 500 INJECTION, SOLUTION INTRAVENOUS at 20:32

## 2022-11-10 RX ADMIN — METRONIDAZOLE 500 MG: 500 INJECTION, SOLUTION INTRAVENOUS at 05:15

## 2022-11-10 RX ADMIN — SODIUM CHLORIDE 75 ML/HR: 9 INJECTION, SOLUTION INTRAVENOUS at 20:32

## 2022-11-10 RX ADMIN — SODIUM CHLORIDE 250 MG: 9 INJECTION, SOLUTION INTRAVENOUS at 18:23

## 2022-11-10 RX ADMIN — Medication 1 CAPSULE: at 18:21

## 2022-11-10 RX ADMIN — Medication 10 ML: at 09:01

## 2022-11-10 RX ADMIN — ACETAMINOPHEN 650 MG: 325 TABLET, FILM COATED ORAL at 12:32

## 2022-11-10 RX ADMIN — PANTOPRAZOLE SODIUM 40 MG: 40 TABLET, DELAYED RELEASE ORAL at 09:00

## 2022-11-10 RX ADMIN — LEVOFLOXACIN 750 MG: 5 INJECTION, SOLUTION INTRAVENOUS at 23:42

## 2022-11-10 NOTE — PROGRESS NOTES
"    [facility] Medicine Services  INPATIENT PROGRESS NOTE    Length of Stay: 1  Date of Admission: 11/9/2022  Primary Care Physician: Peter Keene Jr., MD    Subjective     Chief Complaint:     Abdominal cramping, back pain    HPI     The patient is feeling much better today.  White blood cell count is within normal limits.  He is tolerating IV antibiotics well.  Screening PSA and CEA are within normal limits.  CT scan of the abdomen and pelvis were reviewed showing diverticulitis of the mid to distal descending colon.  There is fatty infiltration of the liver with a vague 8 mm \"lesion\" in the left hepatic lobe as well as a new sclerotic bone lesion in the left ilium above the left acetabulum.  Neither of these lesions are definitive for metastatic process however outpatient work-up, likely PET/CT, should be performed for further delineation of the abnormalities.  Hemoglobin has increased to 7.0 post 1 unit PRBC transfusion.  Another unit has been ordered.  Reticulocyte count is within normal limits.  Ferritin is low at 15.88, iron low at 9.3 with iron saturations low at 3%.  Transferrin low at 198 and TIBC low at 295.  This is consistent with anemia of chronic disease and possibly worsened by chronic blood loss.  The patient takes NSAIDs on a regular basis for arthritis.  This has been discontinued.  Creatinine has improved after IV fluids to 1.21.  Albumin as 3.30 but CMP this a.m. is otherwise unremarkable.  White blood cell count is within normal limits.      Review of Systems     All pertinent negatives and positives are as above. All other systems have been reviewed and are negative unless otherwise stated.     Objective    Temp:  [97.6 °F (36.4 °C)-99.5 °F (37.5 °C)] 97.6 °F (36.4 °C)  Heart Rate:  [76-99] 78  Resp:  [16-20] 16  BP: ()/(56-85) 116/56    Lab Results (last 24 hours)     Procedure Component Value Units Date/Time    Vitamin B12 [[id number]]  (Abnormal) " Collected: 11/09/22 1822    Specimen: Blood Updated: 11/10/22 1251     Vitamin B-12 >2,000 pg/mL     Narrative:      Results may be falsely increased if patient taking Biotin.      PSA Screen [[id number]]  (Normal) Collected: 11/09/22 1822    Specimen: Blood Updated: 11/10/22 1251     PSA 1.530 ng/mL     Narrative:      Results may be falsely decreased if patient taking Biotin.      CEA [[id number]] Collected: 11/09/22 1822    Specimen: Blood Updated: 11/10/22 1251     CEA 2.61 ng/mL     Narrative:      CEA Reference Range:    Non Smokers:   Less than 3 ng/mL  Smokers:       Less than 5 ng/mL  Results may be falsely decreased if patient taking Biotin.      Comprehensive Metabolic Panel [[id number]]  (Abnormal) Collected: 11/10/22 0756    Specimen: Blood Updated: 11/10/22 0926     Glucose 92 mg/dL      BUN 14 mg/dL      Creatinine 1.21 mg/dL      Sodium 137 mmol/L      Potassium 3.6 mmol/L      Chloride 101 mmol/L      CO2 24.0 mmol/L      Calcium 8.4 mg/dL      Total Protein 5.7 g/dL      Albumin 3.30 g/dL      ALT (SGPT) 7 U/L      AST (SGOT) 11 U/L      Alkaline Phosphatase 65 U/L      Total Bilirubin 0.4 mg/dL      Globulin 2.4 gm/dL      A/G Ratio 1.4 g/dL      BUN/Creatinine Ratio 11.6     Anion Gap 12.0 mmol/L      eGFR 65.2 mL/min/1.73      Comment: National Kidney Foundation and American Society of Nephrology (ASN) Task Force recommended calculation based on the Chronic Kidney Disease Epidemiology Collaboration (CKD-EPI) equation refit without adjustment for race.       Narrative:      GFR Normal >60  Chronic Kidney Disease <60  Kidney Failure <15      CBC Auto Differential [[id number]]  (Abnormal) Collected: 11/10/22 0756    Specimen: Blood Updated: 11/10/22 0904     WBC 6.62 10*3/mm3      RBC 2.80 10*6/mm3      Hemoglobin 7.0 g/dL      Hematocrit 22.2 %      MCV 79.3 fL      MCH 25.0 pg      MCHC 31.5 g/dL      RDW 14.9 %      RDW-SD 43.2 fl      MPV 9.1 fL      Platelets 465 10*3/mm3      Neutrophil % 75.2 %       Lymphocyte % 8.9 %      Monocyte % 12.7 %      Eosinophil % 2.6 %      Basophil % 0.3 %      Immature Grans % 0.3 %      Neutrophils, Absolute 4.98 10*3/mm3      Lymphocytes, Absolute 0.59 10*3/mm3      Monocytes, Absolute 0.84 10*3/mm3      Eosinophils, Absolute 0.17 10*3/mm3      Basophils, Absolute 0.02 10*3/mm3      Immature Grans, Absolute 0.02 10*3/mm3      nRBC 0.0 /100 WBC     Iron Profile [[id number]]  (Abnormal) Collected: 11/09/22 1822    Specimen: Blood Updated: 11/09/22 2137     Iron 9 mcg/dL      Iron Saturation 3 %      Transferrin 198 mg/dL      TIBC 295 mcg/dL     Lactate Dehydrogenase [[id number]]  (Normal) Collected: 11/09/22 1822    Specimen: Blood Updated: 11/09/22 2137      U/L     Ferritin [[id number]]  (Abnormal) Collected: 11/09/22 1822    Specimen: Blood Updated: 11/09/22 2137     Ferritin 15.88 ng/mL     Narrative:      Results may be falsely decreased if patient taking Biotin.      Reticulocytes [[id number]]  (Normal) Collected: 11/09/22 2020    Specimen: Blood Updated: 11/09/22 2116     Reticulocyte % 1.68 %      Reticulocyte Absolute 0.0444 10*6/mm3     Lactic Acid, Plasma [[id number]]  (Normal) Collected: 11/09/22 2020    Specimen: Blood Updated: 11/09/22 2047     Lactate 0.8 mmol/L     CBC & Differential [[id number]]  (Abnormal) Collected: 11/09/22 2020    Specimen: Blood Updated: 11/09/22 2041    Narrative:      The following orders were created for panel order CBC & Differential.  Procedure                               Abnormality         Status                     ---------                               -----------         ------                     CBC Auto Differential[[id number]]        Abnormal            Final result                 Please view results for these tests on the individual orders.    CBC Auto Differential [[id number]]  (Abnormal) Collected: 11/09/22 2020    Specimen: Blood Updated: 11/09/22 2041     WBC 9.21 10*3/mm3      RBC 2.67 10*6/mm3      Hemoglobin 6.4  g/dL      Hematocrit 20.6 %      MCV 77.2 fL      MCH 24.0 pg      MCHC 31.1 g/dL      RDW 14.2 %      RDW-SD 39.8 fl      MPV 8.6 fL      Platelets 527 10*3/mm3      Neutrophil % 82.7 %      Lymphocyte % 7.6 %      Monocyte % 7.6 %      Eosinophil % 1.5 %      Basophil % 0.2 %      Immature Grans % 0.4 %      Neutrophils, Absolute 7.61 10*3/mm3      Lymphocytes, Absolute 0.70 10*3/mm3      Monocytes, Absolute 0.70 10*3/mm3      Eosinophils, Absolute 0.14 10*3/mm3      Basophils, Absolute 0.02 10*3/mm3      Immature Grans, Absolute 0.04 10*3/mm3      nRBC 0.0 /100 WBC     POC Occult Blood Stool [[id number]]  (Normal) Resulted: 11/09/22 2038    Specimen: Stool Updated: 11/09/22 2039     Fecal Occult Blood Negative     Lot Number [id number]     Expiration Date 03/31/2023     DEVELOPER LOT NUMBER 225     DEVELOPER EXPIRATION DATE 03/31/2023     Positive Control Positive     Negative Control Negative    Blood Culture - Blood, Arm, Left [[id number]] Collected: 11/09/22 2020    Specimen: Blood from Arm, Left Updated: 11/09/22 2036    Blood Culture - Blood, Arm, Right [[id number]] Collected: 11/09/22 2020    Specimen: Blood from Arm, Right Updated: 11/09/22 2036    [place] Draw [[id number]] Collected: 11/09/22 1822    Specimen: Blood Updated: 11/09/22 1930    Narrative:      The following orders were created for panel order [place] Draw.  Procedure                               Abnormality         Status                     ---------                               -----------         ------                     Green Top (Gel)[[id number]]                                  Final result               Lavender Top[[id number]]                                     Final result               Red Top[[id number]]                                          Final result               Light Blue Top[[id number]]                                   Final result                 Please view results for these tests on the individual orders.    Green Top (Gel)  [[id number]] Collected: 11/09/22 1822    Specimen: Blood Updated: 11/09/22 1930     Extra Tube Hold for add-ons.     Comment: Auto resulted.       Red Top [[id number]] Collected: 11/09/22 1822    Specimen: Blood Updated: 11/09/22 1930     Extra Tube Hold for add-ons.     Comment: Auto resulted.       Light Blue Top [[id number]] Collected: 11/09/22 1822    Specimen: Blood Updated: 11/09/22 1930     Extra Tube Hold for add-ons.     Comment: Auto resulted       Lavender Top [[id number]] Collected: 11/09/22 1822    Specimen: Blood Updated: 11/09/22 1930     Extra Tube hold for add-on     Comment: Auto resulted       CBC & Differential [[id number]]  (Abnormal) Collected: 11/09/22 1822    Specimen: Blood Updated: 11/09/22 1914    Narrative:      The following orders were created for panel order CBC & Differential.  Procedure                               Abnormality         Status                     ---------                               -----------         ------                     CBC Auto Differential[[id number]]        Abnormal            Final result                 Please view results for these tests on the individual orders.    CBC Auto Differential [[id number]]  (Abnormal) Collected: 11/09/22 1822    Specimen: Blood Updated: 11/09/22 1914     WBC 10.77 10*3/mm3      RBC 2.71 10*6/mm3      Hemoglobin 6.5 g/dL      Hematocrit 21.1 %      MCV 77.9 fL      MCH 24.0 pg      MCHC 30.8 g/dL      RDW 14.2 %      RDW-SD 40.3 fl      MPV 8.7 fL      Platelets 518 10*3/mm3      Neutrophil % 82.1 %      Lymphocyte % 6.4 %      Monocyte % 9.3 %      Eosinophil % 1.5 %      Basophil % 0.3 %      Immature Grans % 0.4 %      Neutrophils, Absolute 8.85 10*3/mm3      Lymphocytes, Absolute 0.69 10*3/mm3      Monocytes, Absolute 1.00 10*3/mm3      Eosinophils, Absolute 0.16 10*3/mm3      Basophils, Absolute 0.03 10*3/mm3      Immature Grans, Absolute 0.04 10*3/mm3      nRBC 0.0 /100 WBC     Comprehensive Metabolic Panel [[id number]]   (Abnormal) Collected: 11/09/22 1822    Specimen: Blood Updated: 11/09/22 1855     Glucose 113 mg/dL      BUN 21 mg/dL      Creatinine 1.71 mg/dL      Sodium 135 mmol/L      Potassium 3.9 mmol/L      Chloride 97 mmol/L      CO2 28.0 mmol/L      Calcium 8.7 mg/dL      Total Protein 7.0 g/dL      Albumin 4.00 g/dL      ALT (SGPT) 8 U/L      AST (SGOT) 13 U/L      Alkaline Phosphatase 74 U/L      Total Bilirubin 0.2 mg/dL      Globulin 3.0 gm/dL      A/G Ratio 1.3 g/dL      BUN/Creatinine Ratio 12.3     Anion Gap 10.0 mmol/L      eGFR 43.1 mL/min/1.73      Comment: National Kidney Foundation and American Society of Nephrology (ASN) Task Force recommended calculation based on the Chronic Kidney Disease Epidemiology Collaboration (CKD-EPI) equation refit without adjustment for race.       Narrative:      GFR Normal >60  Chronic Kidney Disease <60  Kidney Failure <15      Lipase [[id number]]  (Abnormal) Collected: 11/09/22 1822    Specimen: Blood Updated: 11/09/22 1850     Lipase 11 U/L     Urinalysis With Culture If Indicated - Urine, Clean Catch [[id number]]  (Normal) Collected: 11/09/22 1832    Specimen: Urine, Clean Catch Updated: 11/09/22 1840     Color, UA Yellow     Appearance, UA Clear     pH, UA 5.5     Specific Gravity, UA 1.010     Glucose, UA Negative     Ketones, UA Negative     Bilirubin, UA Negative     Blood, UA Negative     Protein, UA Negative     Leuk Esterase, UA Negative     Nitrite, UA Negative     Urobilinogen, UA 0.2 E.U./dL    Narrative:      In absence of clinical symptoms, the presence of pyuria, bacteria, and/or nitrites on the urinalysis result does not correlate with infection.  Urine microscopic not indicated.          Imaging Results (Last 24 Hours)     Procedure Component Value Units Date/Time    CT Abdomen Pelvis Without Contrast [[id number]] Collected: 11/09/22 1939     Updated: 11/1954    Narrative:      EXAMINATION:  CT ABDOMEN PELVIS WO CONTRAST-  11/9/2022 7:32 PM CST     HISTORY:  Diverticulitis, complication suspected.     TECHNIQUE: Spiral CT was performed of the abdomen and pelvis without  contrast. Multiplanar images were reconstructed.     DLP: 347 mGy-cm. Automated dosage reduction technique was utilized to  reduce patient dosage.     COMPARISON: 11/12/2016.     LUNG BASES: Calcified left lower lobe granuloma. No airspace  consolidation. Minimal atelectasis. Moderate size hiatal hernia.     LIVER AND SPLEEN: Fatty infiltration of the liver. No dense gallstones.  Spleen measures 11.9 cm in length.     PANCREAS: Normal unenhanced appearance of the pancreas. There is a  duodenal diverticulum posterior to the uncinate process of the  pancreatic head.     KIDNEYS AND ADRENALS: There is a 2 cm right adrenal adenoma with a  Hounsfield unit measurement of -13. There is a stable 1.8 cm left  adrenal nodule. There are no renal stones. There is a probable small  renal cyst arising from the lower pole left kidney. The ureters are  nondilated. The urinary bladder is unremarkable.     BOWEL: No oral contrast was given. Moderate size hiatal hernia. Gastric  wall thickening probably due to underdistention. Small bowel loops are  nondilated. The appendix is normal. There is moderate to large stool in  the colon. There is diverticulosis of the colon. There is stranding of  the fat around the mid to distal descending colon. There is some wall  thickening in the mid descending colon. There is also some wall  thickening in the proximal to mid sigmoid colon without definite acute  inflammatory change.     OTHER: There are small retroperitoneal lymph nodes. There is  atheromatous disease of the aortoiliac vessels. There are degenerative  changes of the spine. There is a new 1.1 cm sclerotic bone lesion in the  left ilium just above the acetabular roof. There are stable bone islands  in the left ilium and in the proximal right femur.       Impression:      1. There is diverticulitis of the mid to distal  descending colon with  stranding of the adjacent fat. There are multiple diverticula in the  area. There is no perforation or abscess. There is mild wall thickening.  2. There is also focal wall thickening in the proximal to mid sigmoid  colon. This is likely related to the diverticular disease. Sigmoid colon  mass is not ruled out. GI follow-up recommended.  3. There are small retroperitoneal lymph nodes that are probably  reactive. There is also a lymph node in the left pelvis near the  diverticulitis with a short axis diameter of 9 mm it is probably also  reactive.  4. Moderate size hiatal hernia. Gastric wall thickening probably due to  underdistention. Normal appendix. No small bowel distention.  5. Fatty infiltration of the liver. There is a vague 8 mm lesion in the  left hepatic lobe image 19 series 2. There is an 8 mm probable cyst in  the right hepatic lobe image 23 series 2.  6. Spleen size is mildly prominent.  7. There is a new sclerotic bone lesion in the left ilium just above the  left acetabulum. A metastatic bone lesion is considered. Other sclerotic  bone lesions appear to be stable, likely bone islands  8. Adrenal adenoma on the right. Probable adrenal adenoma on the left,  stable in size.  9. Other nonacute findings, as discussed.     The full report of this exam was immediately signed and available to the  emergency room. The patient is currently in the emergency room.  This report was finalized on 11/09/2022 19:51 by Dr. Rober Duvall MD.             Intake/Output Summary (Last 24 hours) at 11/10/2022 1700  Last data filed at 11/10/2022 1605  Gross per 24 hour   Intake 3150 ml   Output 650 ml   Net 2500 ml       Physical Exam  Constitutional:       Appearance: He is ill-appearing.      Comments: Appears older than stated age.  HENT:      Head: Normocephalic and atraumatic.      Mouth: Mucous membranes are moist.      Pharynx: Oropharynx is clear.   Eyes:      Extraocular Movements: Extraocular  movements intact.      Conjunctiva/sclera: Conjunctivae normal.   Cardiovascular:      Rate and Rhythm: Normal rate and regular rhythm.   Pulmonary:      Effort: Pulmonary effort is normal.      Breath sounds: Normal breath sounds.   Abdominal:      Palpations: Abdomen is soft.      Tenderness: There is abdominal tenderness.   Musculoskeletal:         General: Normal range of motion.      Right lower leg: No edema.      Left lower leg: No edema.   Skin:     General: Skin is warm and dry.      Coloration: Skin is pale.   Neurological:      General: No focal deficit present.      Mental Status: He is alert and oriented to person, place, and time.   Psychiatric:         Mood and Affect: Mood normal.         Behavior: Behavior normal.     Results Review:  I have reviewed the labs, radiology results, and diagnostic studies since my last progress note and made treatment changes reflective of the results.   I have reviewed the current medications.    Assessment/Plan     Active Hospital Problems    Diagnosis    • **Acute diverticulitis    • GIB (gastrointestinal bleeding)    • Microcytic anemia    • Lytic bone lesions on xray    • Liver lesion    • PIETER (acute kidney injury) (HCC)    • Back pain        PLAN:  1 unit PRBC infusion today  CBC and BMP in a.m.  Continue IV antibiotics  Continue Protonix  Sucralfate 2 g p.o. twice daily  Plan to transition to oral antibiotics tomorrow with possible discharge if hemoglobin has recovered and there is no evidence of active GI blood loss.    Electronically signed by Alhaji Ramirez DO, 11/10/22, 17:00 CST.

## 2022-11-10 NOTE — H&P
"    [facility]  HISTORY AND PHYSICAL    Date of Admission: 11/9/2022  Primary Care Physician: Peter Keene Jr., MD    Subjective     Chief Complaint: Abdominal cramping and back pain    History of Present Illness  Andi Keene is a 67-year-old male with a past medical history of anemia, diverticulosis, GERD, hypertension and chronic back pain.  He presented to urgent care today for evaluation of abdominal pain and low back pain.  He states this has been going on for 2 to 3 weeks.  He has had episodes of constipation but does not feel this is a problem.  He states he has had intermittent blood in the stool.  He is having difficulty with starting and stopping urine stream.  With need for further evaluation he was advised to seek evaluation at [facility] emergency department.  Reveals PIETER with a creatinine 1.71, microcytic anemia with hemoglobin 6.4.  CT of the abdomen pelvis did reveal acute diverticulitis, left ilium sclerotic lesion and multiple lesions on the liver.  Currently patient reporting back pain and low abdominal cramping.  He states he has been short of breath and weak for \"a long time\".  He has no other complaints.  He is admitted for further evaluation treatment.    Review of Systems   A 10 point review of systems was completed, all negative except for those discussed in HPI    Past Medical History:   Past Medical History:   Diagnosis Date    Anemia     Diverticulosis     GERD (gastroesophageal reflux disease)     Hypertension     Lumbar back pain        Past Surgical History:   Past Surgical History:   Procedure Laterality Date    COLONOSCOPY N/A 11/15/2016    Procedure: COLONOSCOPY WITH ANESTHESIA;  Surgeon: Orestes Sams DO;  Location: [facility] ENDOSCOPY;  Service:     COLONOSCOPY N/A 8/15/2022    Procedure: COLONOSCOPY WITH ANESTHESIA;  Surgeon: Orestes Sams DO;  Location: [facility] ENDOSCOPY;  Service: Gastroenterology;  " Laterality: N/A;  preop; abdominal pain  postop; diverticulosis; suboptimal prep;   PCP Peter Keene     ENDOSCOPY N/A 11/14/2016    Procedure: ESOPHAGOGASTRODUODENOSCOPY WITH ANESTHESIA;  Surgeon: Orestes Sams DO;  Location: [facility] ENDOSCOPY;  Service:     HERNIA REPAIR      REPLACEMENT TOTAL KNEE      TONSILLECTOMY      UMBILICAL HERNIA REPAIR N/A 5/4/2018    Procedure: OPEN UMBILICAL HERNIA REPAIR WITH MESH;  Surgeon: Augusta Sherman MD;  Location: [facility] OR;  Service: General       Family History: family history includes Heart disease in his father; No Known Problems in his mother; Stomach cancer in his father.    Social History:  reports that he has quit smoking. He has never used smokeless tobacco. He reports current alcohol use of about 1.0 standard drink per week. He reports that he does not use drugs.    Code Status: Full, if unable speak for himself his sister Dorothy will speak for      Allergies:  No Known Allergies    Medications:  No current facility-administered medications on file prior to encounter.     Current Outpatient Medications on File Prior to Encounter   Medication Sig Dispense Refill    cyclobenzaprine (FLEXERIL) 5 MG tablet TAKE 1 TABLET TWICE A DAY AS NEEDED FOR MUSCLE SPASM      diclofenac (VOLTAREN) 75 MG EC tablet       ferrous sulfate 324 (65 FE) MG tablet delayed-release EC tablet Take 324 mg by mouth Daily With Breakfast.      hydroCHLOROthiazide (HYDRODIURIL) 12.5 MG tablet Take 1 tablet by mouth Daily.      Methylcellulose, Laxative, (FIBER THERAPY PO) Take  by mouth 6 (Six) Times a Day.      multivitamin with minerals tablet tablet Take 1 tablet by mouth Daily.      omeprazole (prilOSEC) 10 MG capsule Take 1 tablet by mouth Daily.      pantoprazole (PROTONIX) 40 MG EC tablet Take 40 mg by mouth Every Morning.       I have utilized all available immediate resources to obtain, update, and review the patient's current medications.    Objective     /72   Pulse 85   Temp 98  "°F (36.7 °C) (Oral)   Resp 20   Ht 170.2 cm (67\")   Wt 74.4 kg (164 lb)   SpO2 99%   BMI 25.69 kg/m²   Physical Exam  Constitutional:       Appearance: He is ill-appearing.      Comments: Looks older than stated   HENT:      Head: Normocephalic and atraumatic.      Mouth/Throat:      Mouth: Mucous membranes are moist.      Pharynx: Oropharynx is clear.   Eyes:      Extraocular Movements: Extraocular movements intact.      Conjunctiva/sclera: Conjunctivae normal.   Cardiovascular:      Rate and Rhythm: Normal rate and regular rhythm.   Pulmonary:      Effort: Pulmonary effort is normal.      Breath sounds: Normal breath sounds.   Abdominal:      Palpations: Abdomen is soft.      Tenderness: There is abdominal tenderness.   Musculoskeletal:         General: Normal range of motion.      Cervical back: Normal range of motion and neck supple.      Right lower leg: No edema.      Left lower leg: No edema.   Skin:     General: Skin is warm and dry.      Coloration: Skin is pale.   Neurological:      General: No focal deficit present.      Mental Status: He is alert and oriented to person, place, and time.   Psychiatric:         Mood and Affect: Mood normal.         Behavior: Behavior normal.       Pertinent Data:   Lab Results (last 72 hours)       Procedure Component Value Units Date/Time    Iron Profile [[id number]]  (Abnormal) Collected: 11/09/22 1822    Specimen: Blood Updated: 11/09/22 2137     Iron 9 mcg/dL      Iron Saturation 3 %      Transferrin 198 mg/dL      TIBC 295 mcg/dL     Lactate Dehydrogenase [[id number]]  (Normal) Collected: 11/09/22 1822    Specimen: Blood Updated: 11/09/22 2137      U/L     Ferritin [[id number]]  (Abnormal) Collected: 11/09/22 1822    Specimen: Blood Updated: 11/09/22 2137     Ferritin 15.88 ng/mL     Narrative:      Results may be falsely decreased if patient taking Biotin.      Reticulocytes [[id number]]  (Normal) Collected: 11/09/22 2020    Specimen: Blood Updated: 11/09/22 " 2116     Reticulocyte % 1.68 %      Reticulocyte Absolute 0.0444 10*6/mm3     CEA [[id number]] Collected: 11/09/22 1822    Specimen: Blood Updated: 11/09/22 2116    Vitamin B12 [[id number]] Collected: 11/09/22 1822    Specimen: Blood Updated: 11/09/22 2116    Lactic Acid, Plasma [[id number]]  (Normal) Collected: 11/09/22 2020    Specimen: Blood Updated: 11/09/22 2047     Lactate 0.8 mmol/L     CBC Auto Differential [[id number]]  (Abnormal) Collected: 11/09/22 2020    Specimen: Blood Updated: 11/09/22 2041     WBC 9.21 10*3/mm3      RBC 2.67 10*6/mm3      Hemoglobin 6.4 g/dL      Hematocrit 20.6 %      MCV 77.2 fL      MCH 24.0 pg      MCHC 31.1 g/dL      RDW 14.2 %      RDW-SD 39.8 fl      MPV 8.6 fL      Platelets 527 10*3/mm3      Neutrophil % 82.7 %      Lymphocyte % 7.6 %      Monocyte % 7.6 %      Eosinophil % 1.5 %      Basophil % 0.2 %      Immature Grans % 0.4 %      Neutrophils, Absolute 7.61 10*3/mm3      Lymphocytes, Absolute 0.70 10*3/mm3      Monocytes, Absolute 0.70 10*3/mm3      Eosinophils, Absolute 0.14 10*3/mm3      Basophils, Absolute 0.02 10*3/mm3      Immature Grans, Absolute 0.04 10*3/mm3      nRBC 0.0 /100 WBC     POC Occult Blood Stool [[id number]]  (Normal) Resulted: 11/09/22 2038    Specimen: Stool Updated: 11/09/22 2039     Fecal Occult Blood Negative     Lot Number [id number]     Expiration Date 03/31/2023     DEVELOPER LOT NUMBER [id number]     DEVELOPER EXPIRATION DATE 03/31/2023     Positive Control Positive     Negative Control Negative    Blood Culture - Blood, Arm, Left [[id number]] Collected: 11/09/22 2020    Specimen: Blood from Arm, Left Updated: 11/09/22 2036    Blood Culture - Blood, Arm, Right [[id number]] Collected: 11/09/22 2020    Specimen: Blood from Arm, Right Updated: 11/09/22 2036    CBC Auto Differential [[id number]]  (Abnormal) Collected: 11/09/22 1822    Specimen: Blood Updated: 11/09/22 1914     WBC 10.77 10*3/mm3      RBC 2.71 10*6/mm3      Hemoglobin 6.5 g/dL      Hematocrit 21.1 %       MCV 77.9 fL      MCH 24.0 pg      MCHC 30.8 g/dL      RDW 14.2 %      RDW-SD 40.3 fl      MPV 8.7 fL      Platelets 518 10*3/mm3      Neutrophil % 82.1 %      Lymphocyte % 6.4 %      Monocyte % 9.3 %      Eosinophil % 1.5 %      Basophil % 0.3 %      Immature Grans % 0.4 %      Neutrophils, Absolute 8.85 10*3/mm3      Lymphocytes, Absolute 0.69 10*3/mm3      Monocytes, Absolute 1.00 10*3/mm3      Eosinophils, Absolute 0.16 10*3/mm3      Basophils, Absolute 0.03 10*3/mm3      Immature Grans, Absolute 0.04 10*3/mm3      nRBC 0.0 /100 WBC     Comprehensive Metabolic Panel [[id number]]  (Abnormal) Collected: 11/09/22 1822    Specimen: Blood Updated: 11/09/22 1855     Glucose 113 mg/dL      BUN 21 mg/dL      Creatinine 1.71 mg/dL      Sodium 135 mmol/L      Potassium 3.9 mmol/L      Chloride 97 mmol/L      CO2 28.0 mmol/L      Calcium 8.7 mg/dL      Total Protein 7.0 g/dL      Albumin 4.00 g/dL      ALT (SGPT) 8 U/L      AST (SGOT) 13 U/L      Alkaline Phosphatase 74 U/L      Total Bilirubin 0.2 mg/dL      Globulin 3.0 gm/dL      A/G Ratio 1.3 g/dL      BUN/Creatinine Ratio 12.3     Anion Gap 10.0 mmol/L      eGFR 43.1 mL/min/1.73     Lipase [[id number]]  (Abnormal) Collected: 11/09/22 1822    Specimen: Blood Updated: 11/09/22 1850     Lipase 11 U/L     Urinalysis With Culture If Indicated - Urine, Clean Catch [[id number]]  (Normal) Collected: 11/09/22 1832    Specimen: Urine, Clean Catch Updated: 11/09/22 1840     Color, UA Yellow     Appearance, UA Clear     pH, UA 5.5     Specific Gravity, UA 1.010     Glucose, UA Negative     Ketones, UA Negative     Bilirubin, UA Negative     Blood, UA Negative     Protein, UA Negative     Leuk Esterase, UA Negative     Nitrite, UA Negative     Urobilinogen, UA 0.2 E.U./dL     Imaging Results (Last 24 Hours)       Procedure Component Value Units Date/Time    CT Abdomen Pelvis Without Contrast [[id number]] Collected: 11/09/22 1939     Updated: 11/1954    Narrative:       EXAMINATION:  CT ABDOMEN PELVIS WO CONTRAST-  11/9/2022 7:32 PM CST     HISTORY: Diverticulitis, complication suspected.     TECHNIQUE: Spiral CT was performed of the abdomen and pelvis without  contrast. Multiplanar images were reconstructed.     DLP: 347 mGy-cm. Automated dosage reduction technique was utilized to  reduce patient dosage.     COMPARISON: 11/12/2016.     LUNG BASES: Calcified left lower lobe granuloma. No airspace  consolidation. Minimal atelectasis. Moderate size hiatal hernia.     LIVER AND SPLEEN: Fatty infiltration of the liver. No dense gallstones.  Spleen measures 11.9 cm in length.     PANCREAS: Normal unenhanced appearance of the pancreas. There is a  duodenal diverticulum posterior to the uncinate process of the  pancreatic head.     KIDNEYS AND ADRENALS: There is a 2 cm right adrenal adenoma with a  Hounsfield unit measurement of -13. There is a stable 1.8 cm left  adrenal nodule. There are no renal stones. There is a probable small  renal cyst arising from the lower pole left kidney. The ureters are  nondilated. The urinary bladder is unremarkable.     BOWEL: No oral contrast was given. Moderate size hiatal hernia. Gastric  wall thickening probably due to underdistention. Small bowel loops are  nondilated. The appendix is normal. There is moderate to large stool in  the colon. There is diverticulosis of the colon. There is stranding of  the fat around the mid to distal descending colon. There is some wall  thickening in the mid descending colon. There is also some wall  thickening in the proximal to mid sigmoid colon without definite acute  inflammatory change.     OTHER: There are small retroperitoneal lymph nodes. There is  atheromatous disease of the aortoiliac vessels. There are degenerative  changes of the spine. There is a new 1.1 cm sclerotic bone lesion in the  left ilium just above the acetabular roof. There are stable bone islands  in the left ilium and in the proximal right  femur.       Impression:      1. There is diverticulitis of the mid to distal descending colon with  stranding of the adjacent fat. There are multiple diverticula in the  area. There is no perforation or abscess. There is mild wall thickening.  2. There is also focal wall thickening in the proximal to mid sigmoid  colon. This is likely related to the diverticular disease. Sigmoid colon  mass is not ruled out. GI follow-up recommended.  3. There are small retroperitoneal lymph nodes that are probably  reactive. There is also a lymph node in the left pelvis near the  diverticulitis with a short axis diameter of 9 mm it is probably also  reactive.  4. Moderate size hiatal hernia. Gastric wall thickening probably due to  underdistention. Normal appendix. No small bowel distention.  5. Fatty infiltration of the liver. There is a vague 8 mm lesion in the  left hepatic lobe image 19 series 2. There is an 8 mm probable cyst in  the right hepatic lobe image 23 series 2.  6. Spleen size is mildly prominent.  7. There is a new sclerotic bone lesion in the left ilium just above the  left acetabulum. A metastatic bone lesion is considered. Other sclerotic  bone lesions appear to be stable, likely bone islands  8. Adrenal adenoma on the right. Probable adrenal adenoma on the left,  stable in size.  9. Other nonacute findings, as discussed.     The full report of this exam was immediately signed and available to the  emergency room. The patient is currently in the emergency room.  This report was finalized on 11/09/2022 19:51 by Dr. Rober Duvall MD.            Assessment / Plan     Assessment:   Active Hospital Problems    Diagnosis     **Acute diverticulitis     Microcytic anemia     Lytic bone lesions on xray     Liver lesion     PIETER (acute kidney injury) (HCC)     Back pain    Suspect neoplasm, unknown site    Plan:   1.  Admit as inpatient  2.  Transfer 1 unit packed red blood cells  3.  Gluconate 250 mg IV x1  4.  Pharmacy  to dose Levaquin, continue Flagyl  5.  Home medications reviewed and restarted as appropriate  6.  Pain and nausea management  7.  DVT prophylaxis with SCD  8.  Normal saline 75 mill/hour  9.  Supplemental oxygen as needed, continuous pulse oximetry, remote telemetry  10.  Labs in a.m., CEA, LDH, PSA    I discussed the patient's findings and my recommendations with: Nabil Mcrae DO  Time spent 40 minutes    Patient seen and examined by me on 11/9/2022 at 9:04 PM.        Chart reviewed.    Patient examined in conjunction with SONAM Pereira at 2110 on 11/9/2022.    Patient is ill-appearing.  Lungs are clear to auscultation bilaterally  Cardiovascular is regular rate and rhythm without murmur gallop  Abdomen is soft, bowel sounds are present, he has generalized tenderness to palpation without rebound.  Extremities no clubbing or cyanosis.    Agree with assessment and plan.  Discussed with the patient and with SONAM Pereira DO  11/13/22  19:41 CST

## 2022-11-10 NOTE — PLAN OF CARE
Goal Outcome Evaluation:  Plan of Care Reviewed With: patient        Progress: no change  Outcome Evaluation: Pt from ED with diverticulitis. Hgb low in ED, one unit of PRBC's transfused. IV Iron given per order. IVF and IV abx as ordered. Tele in place. Scd's in place. Up with standby assist to bathroom. Need stool for OB, no stool this shift.

## 2022-11-10 NOTE — PAYOR COMM NOTE
"Admit 11/9/2022    [facility]  [place],  [phone]  OR  FAX   [phone]    Andi TREVIÑO (67 y.o. Male)     Date of Birth   1955    Social Security Number       Address   [address] [place], [place] [address]    Home Phone   [phone]    MRN   [id number]       Judaism   Quaker    Marital Status                               Admission Date   11/9/22    Admission Type   Emergency    Admitting Provider   Alhaji Ramirez DO    Attending Provider   Alhaji Ramirez DO    Department, Room/Bed   [facility] 3C, 372/1       Discharge Date       Discharge Disposition       Discharge Destination                               Attending Provider: Alhaji Ramirez DO    Allergies: No Known Allergies    Isolation: None   Infection: None   Code Status: CPR    Ht: 170.2 cm (67\")   Wt: 75 kg (165 lb 6.4 oz)    Admission Cmt: None   Principal Problem: Acute diverticulitis [K57.92]                 Active Insurance as of 11/9/2022     Primary Coverage     Payor Plan Insurance Group Employer/Plan Group    [facility] MEDICARE REPLACEMENT [employer] MEDICARE REPLACEMENT      Payor Plan Address Payor Plan Phone Number Payor Plan Fax Number Effective Dates    [address] [address] [phone]  8/1/2022 - None Entered    [facility] [id number]       Subscriber Name Subscriber Birth Date Member ID       ANDI TREVIÑO ANSELMO 1955 [id number]                 Emergency Contacts      (Rel.) Home Phone Work Phone Mobile Phone    Dorothy Pappas (Sister) [phone] -- --        Patient Care Timeline (11/9/2022 16:53 to 11/9/2022 23:29)    11/9/2022 11/9/2022 Event Details User   16:53 Patient arrival  Nikko Sevilla PCT   16:53:23 Arrival Complaint Difficulty Urinating       17:13 HPI HPI (Adult)  Stated Reason for Visit: pt sent from  urine retention. pt report last void this am. pt senies any pain presently, reports abd cramps pta, hx diveriticulitis. no n/v/d or " "blood in stool. skin is pale.  History Obtained From: patient Gloria Garza RN   17:14 Vital Signs Vital Signs  Temp: 98 °F (36.7 °C)  Temp src: Oral  Heart Rate: 85  Resp: 20  BP: 128/72  BMI (Calculated): 25.7  Oxygen Therapy  SpO2: 99 %  Vitals Timer  Restart Vitals Timer: Yes  Height and Weight  Height: 170.2 cm (67\")  Weight: 74.4 kg (164 lb)  Other flowsheet entries  Ideal Body Weight k.1 Gloria Garza RN     19:13:42 Lab Notifications Lab communication received  Notice received from: Maty  Notice type: Critical value Abnormal   Critical value(s)  Critical value #1: Hgb Abnormal  (6.5)  Read back and verified: Patient name; Patient ID; Result(s)  Provider(s) notified: ED physician Kaleigh Keene RN     20:31 Medication New Bag lactated ringers bolus 1,000 mL - Dose: 1,000 mL ; Rate: 2,000 mL/hr ; Route: Intravenous ; Line: Peripheral IV 22 Right Antecubital ; Scheduled Time:  Javon Dickens RN     20:49 Medication New Bag metroNIDAZOLE (FLAGYL) IVPB 500 mg - Dose: 500 mg ; Route: Intravenous ; Line: Peripheral IV 22 Right Antecubital ; Scheduled Time:  Javon Dickens RN       21:55:09 Vital Signs Vital Signs  Temp: 99.3 °F (37.4 °C)  Temp src: Oral  Heart Rate: 80  Resp: 16  BP: 117/85  Oxygen Therapy  SpO2: 97 %  Vitals Timer  Restart Vitals Timer: Yes Javon Dickens RN     22:25 Medication New Bag Transfuse RBC Infuse Each Unit Over: 3.5H - Unit Number:   22  [id number]  [id number] ; Rate: 75 mL/hr ; Route: Intravenous ; Line: Peripheral IV 22 2216 Anterior;Left Forearm Javon Dickens RN   22:30 Medication New Bag levoFLOXacin (LEVAQUIN) 750 mg/150 mL D5W (premix) (LEVAQUIN) 750 mg - Dose: 750 mg ; Route: Intravenous ; Line: Peripheral IV 22 Right Antecubital ; Scheduled Time:  Javon Dickens, RN     22:39 Medication Rate Changed Transfuse RBC Infuse Each Unit Over: 3.5H - Unit Number:   22 " " [id number]  [id number] ; Rate: 150 mL/hr ; Route: Intravenous ; Line: Peripheral IV 11/09/22 2216 Anterior;Left Forearm Javon Dickens, Ama Bahena, RN   Registered Nurse  Nursing  Plan of Care      Signed  Date of Service:  11/10/22 0254  Creation Time:  11/10/22 0254     Signed        Goal Outcome Evaluation:  Plan of Care Reviewed With: patient  Progress: no change  Outcome Evaluation: Pt from ED with diverticulitis. Hgb low in ED, one unit of PRBC's transfused. IV Iron given per order. IVF and IV abx as ordered. Tele in place. Scd's in place. Up with standby assist to bathroom. Need stool for OB, no stool this shift.                   History & Physical      Ember Hameed APRN at 11/09/22 2104              [facility] Medicine Services  HISTORY AND PHYSICAL    Date of Admission: 11/9/2022  Primary Care Physician: Peter Keene Jr., MD    Subjective     Chief Complaint: Abdominal cramping and back pain    History of Present Illness  Andi Keene is a 67-year-old male with a past medical history of anemia, diverticulosis, GERD, hypertension and chronic back pain.  He presented to urgent care today for evaluation of abdominal pain and low back pain.  He states this has been going on for 2 to 3 weeks.  He has had episodes of constipation but does not feel this is a problem.  He states he has had intermittent blood in the stool.  He is having difficulty with starting and stopping urine stream.  With need for further evaluation he was advised to seek evaluation at [facility] emergency department.  Reveals PIETER with a creatinine 1.71, microcytic anemia with hemoglobin 6.4.  CT of the abdomen pelvis did reveal acute diverticulitis, left ilium sclerotic lesion and multiple lesions on the liver.  Currently patient reporting back pain and low abdominal cramping.  He states he has been short of breath and weak for \"a long time\".  He has no other " complaints.  He is admitted for further evaluation treatment.    Review of Systems   A 10 point review of systems was completed, all negative except for those discussed in HPI    Past Medical History:   Past Medical History:   Diagnosis Date   • Anemia    • Diverticulosis    • GERD (gastroesophageal reflux disease)    • Hypertension    • Lumbar back pain        Past Surgical History:   Past Surgical History:   Procedure Laterality Date   • COLONOSCOPY N/A 11/15/2016    Procedure: COLONOSCOPY WITH ANESTHESIA;  Surgeon: Orestes Sams DO;  Location: [facility] ENDOSCOPY;  Service:    • COLONOSCOPY N/A 8/15/2022    Procedure: COLONOSCOPY WITH ANESTHESIA;  Surgeon: Orestes Sams DO;  Location: [facility] ENDOSCOPY;  Service: Gastroenterology;  Laterality: N/A;  preop; abdominal pain  postop; diverticulosis; suboptimal prep;   PCP Peter Keene    • ENDOSCOPY N/A 11/14/2016    Procedure: ESOPHAGOGASTRODUODENOSCOPY WITH ANESTHESIA;  Surgeon: Orestes Sams DO;  Location: [facility] ENDOSCOPY;  Service:    • HERNIA REPAIR     • REPLACEMENT TOTAL KNEE     • TONSILLECTOMY     • UMBILICAL HERNIA REPAIR N/A 5/4/2018    Procedure: OPEN UMBILICAL HERNIA REPAIR WITH MESH;  Surgeon: Augusta Sherman MD;  Location: [facility] OR;  Service: General       Family History: family history includes Heart disease in his father; No Known Problems in his mother; Stomach cancer in his father.    Social History:  reports that he has quit smoking. He has never used smokeless tobacco. He reports current alcohol use of about 1.0 standard drink per week. He reports that he does not use drugs.    Code Status: Full, if unable speak for himself his sister Dorothy will speak for      Allergies:  No Known Allergies    Medications:  No current facility-administered medications on file prior to encounter.     Current Outpatient Medications on File Prior to Encounter   Medication Sig Dispense Refill   • cyclobenzaprine (FLEXERIL) 5 MG tablet TAKE 1 TABLET TWICE A  "DAY AS NEEDED FOR MUSCLE SPASM     • diclofenac (VOLTAREN) 75 MG EC tablet      • ferrous sulfate 324 (65 FE) MG tablet delayed-release EC tablet Take 324 mg by mouth Daily With Breakfast.     • hydroCHLOROthiazide (HYDRODIURIL) 12.5 MG tablet Take 1 tablet by mouth Daily.     • Methylcellulose, Laxative, (FIBER THERAPY PO) Take  by mouth 6 (Six) Times a Day.     • multivitamin with minerals tablet tablet Take 1 tablet by mouth Daily.     • omeprazole (prilOSEC) 10 MG capsule Take 1 tablet by mouth Daily.     • pantoprazole (PROTONIX) 40 MG EC tablet Take 40 mg by mouth Every Morning.       I have utilized all available immediate resources to obtain, update, and review the patient's current medications.    Objective     /72   Pulse 85   Temp 98 °F (36.7 °C) (Oral)   Resp 20   Ht 170.2 cm (67\")   Wt 74.4 kg (164 lb)   SpO2 99%   BMI 25.69 kg/m²   Physical Exam  Constitutional:       Appearance: He is ill-appearing.      Comments: Looks older than stated   HENT:      Head: Normocephalic and atraumatic.      Mouth/Throat:      Mouth: Mucous membranes are moist.      Pharynx: Oropharynx is clear.   Eyes:      Extraocular Movements: Extraocular movements intact.      Conjunctiva/sclera: Conjunctivae normal.   Cardiovascular:      Rate and Rhythm: Normal rate and regular rhythm.   Pulmonary:      Effort: Pulmonary effort is normal.      Breath sounds: Normal breath sounds.   Abdominal:      Palpations: Abdomen is soft.      Tenderness: There is abdominal tenderness.   Musculoskeletal:         General: Normal range of motion.      Cervical back: Normal range of motion and neck supple.      Right lower leg: No edema.      Left lower leg: No edema.   Skin:     General: Skin is warm and dry.      Coloration: Skin is pale.   Neurological:      General: No focal deficit present.      Mental Status: He is alert and oriented to person, place, and time.   Psychiatric:         Mood and Affect: Mood normal.         " Behavior: Behavior normal.       Pertinent Data:   Lab Results (last 72 hours)     Procedure Component Value Units Date/Time    Iron Profile [[id number]]  (Abnormal) Collected: 11/09/22 1822    Specimen: Blood Updated: 11/09/22 2137     Iron 9 mcg/dL      Iron Saturation 3 %      Transferrin 198 mg/dL      TIBC 295 mcg/dL     Lactate Dehydrogenase [[id number]]  (Normal) Collected: 11/09/22 1822    Specimen: Blood Updated: 11/09/22 2137      U/L     Ferritin [[id number]]  (Abnormal) Collected: 11/09/22 1822    Specimen: Blood Updated: 11/09/22 2137     Ferritin 15.88 ng/mL     Narrative:      Results may be falsely decreased if patient taking Biotin.      Reticulocytes [[id number]]  (Normal) Collected: 11/09/22 2020    Specimen: Blood Updated: 11/09/22 2116     Reticulocyte % 1.68 %      Reticulocyte Absolute 0.0444 10*6/mm3     CEA [[id number]] Collected: 11/09/22 1822    Specimen: Blood Updated: 11/09/22 2116    Vitamin B12 [[id number]] Collected: 11/09/22 1822    Specimen: Blood Updated: 11/09/22 2116    Lactic Acid, Plasma [[id number]]  (Normal) Collected: 11/09/22 2020    Specimen: Blood Updated: 11/09/22 2047     Lactate 0.8 mmol/L     CBC Auto Differential [[id number]]  (Abnormal) Collected: 11/09/22 2020    Specimen: Blood Updated: 11/09/22 2041     WBC 9.21 10*3/mm3      RBC 2.67 10*6/mm3      Hemoglobin 6.4 g/dL      Hematocrit 20.6 %      MCV 77.2 fL      MCH 24.0 pg      MCHC 31.1 g/dL      RDW 14.2 %      RDW-SD 39.8 fl      MPV 8.6 fL      Platelets 527 10*3/mm3      Neutrophil % 82.7 %      Lymphocyte % 7.6 %      Monocyte % 7.6 %      Eosinophil % 1.5 %      Basophil % 0.2 %      Immature Grans % 0.4 %      Neutrophils, Absolute 7.61 10*3/mm3      Lymphocytes, Absolute 0.70 10*3/mm3      Monocytes, Absolute 0.70 10*3/mm3      Eosinophils, Absolute 0.14 10*3/mm3      Basophils, Absolute 0.02 10*3/mm3      Immature Grans, Absolute 0.04 10*3/mm3      nRBC 0.0 /100 WBC     POC Occult Blood Stool  [[id number]]  (Normal) Resulted: 11/09/22 2038    Specimen: Stool Updated: 11/09/22 2039     Fecal Occult Blood Negative     Lot Number [id number]     Expiration Date 03/31/2023     DEVELOPER LOT NUMBER 225     DEVELOPER EXPIRATION DATE 03/31/2023     Positive Control Positive     Negative Control Negative    Blood Culture - Blood, Arm, Left [[id number]] Collected: 11/09/22 2020    Specimen: Blood from Arm, Left Updated: 11/09/22 2036    Blood Culture - Blood, Arm, Right [[id number]] Collected: 11/09/22 2020    Specimen: Blood from Arm, Right Updated: 11/09/22 2036    CBC Auto Differential [[id number]]  (Abnormal) Collected: 11/09/22 1822    Specimen: Blood Updated: 11/09/22 1914     WBC 10.77 10*3/mm3      RBC 2.71 10*6/mm3      Hemoglobin 6.5 g/dL      Hematocrit 21.1 %      MCV 77.9 fL      MCH 24.0 pg      MCHC 30.8 g/dL      RDW 14.2 %      RDW-SD 40.3 fl      MPV 8.7 fL      Platelets 518 10*3/mm3      Neutrophil % 82.1 %      Lymphocyte % 6.4 %      Monocyte % 9.3 %      Eosinophil % 1.5 %      Basophil % 0.3 %      Immature Grans % 0.4 %      Neutrophils, Absolute 8.85 10*3/mm3      Lymphocytes, Absolute 0.69 10*3/mm3      Monocytes, Absolute 1.00 10*3/mm3      Eosinophils, Absolute 0.16 10*3/mm3      Basophils, Absolute 0.03 10*3/mm3      Immature Grans, Absolute 0.04 10*3/mm3      nRBC 0.0 /100 WBC     Comprehensive Metabolic Panel [[id number]]  (Abnormal) Collected: 11/09/22 1822    Specimen: Blood Updated: 11/09/22 1855     Glucose 113 mg/dL      BUN 21 mg/dL      Creatinine 1.71 mg/dL      Sodium 135 mmol/L      Potassium 3.9 mmol/L      Chloride 97 mmol/L      CO2 28.0 mmol/L      Calcium 8.7 mg/dL      Total Protein 7.0 g/dL      Albumin 4.00 g/dL      ALT (SGPT) 8 U/L      AST (SGOT) 13 U/L      Alkaline Phosphatase 74 U/L      Total Bilirubin 0.2 mg/dL      Globulin 3.0 gm/dL      A/G Ratio 1.3 g/dL      BUN/Creatinine Ratio 12.3     Anion Gap 10.0 mmol/L      eGFR 43.1 mL/min/1.73     Lipase [[id number]]   (Abnormal) Collected: 11/09/22 1822    Specimen: Blood Updated: 11/09/22 1850     Lipase 11 U/L     Urinalysis With Culture If Indicated - Urine, Clean Catch [[id number]]  (Normal) Collected: 11/09/22 1832    Specimen: Urine, Clean Catch Updated: 11/09/22 1840     Color, UA Yellow     Appearance, UA Clear     pH, UA 5.5     Specific Gravity, UA 1.010     Glucose, UA Negative     Ketones, UA Negative     Bilirubin, UA Negative     Blood, UA Negative     Protein, UA Negative     Leuk Esterase, UA Negative     Nitrite, UA Negative     Urobilinogen, UA 0.2 E.U./dL     Imaging Results (Last 24 Hours)     Procedure Component Value Units Date/Time    CT Abdomen Pelvis Without Contrast [[id number]] Collected: 11/09/22 1939     Updated: 11/1955    Narrative:      EXAMINATION:  CT ABDOMEN PELVIS WO CONTRAST-  11/9/2022 7:32 PM CST     HISTORY: Diverticulitis, complication suspected.     TECHNIQUE: Spiral CT was performed of the abdomen and pelvis without  contrast. Multiplanar images were reconstructed.     DLP: 347 mGy-cm. Automated dosage reduction technique was utilized to  reduce patient dosage.     COMPARISON: 11/12/2016.     LUNG BASES: Calcified left lower lobe granuloma. No airspace  consolidation. Minimal atelectasis. Moderate size hiatal hernia.     LIVER AND SPLEEN: Fatty infiltration of the liver. No dense gallstones.  Spleen measures 11.9 cm in length.     PANCREAS: Normal unenhanced appearance of the pancreas. There is a  duodenal diverticulum posterior to the uncinate process of the  pancreatic head.     KIDNEYS AND ADRENALS: There is a 2 cm right adrenal adenoma with a  Hounsfield unit measurement of -13. There is a stable 1.8 cm left  adrenal nodule. There are no renal stones. There is a probable small  renal cyst arising from the lower pole left kidney. The ureters are  nondilated. The urinary bladder is unremarkable.     BOWEL: No oral contrast was given. Moderate size hiatal hernia. Gastric  wall  thickening probably due to underdistention. Small bowel loops are  nondilated. The appendix is normal. There is moderate to large stool in  the colon. There is diverticulosis of the colon. There is stranding of  the fat around the mid to distal descending colon. There is some wall  thickening in the mid descending colon. There is also some wall  thickening in the proximal to mid sigmoid colon without definite acute  inflammatory change.     OTHER: There are small retroperitoneal lymph nodes. There is  atheromatous disease of the aortoiliac vessels. There are degenerative  changes of the spine. There is a new 1.1 cm sclerotic bone lesion in the  left ilium just above the acetabular roof. There are stable bone islands  in the left ilium and in the proximal right femur.       Impression:      1. There is diverticulitis of the mid to distal descending colon with  stranding of the adjacent fat. There are multiple diverticula in the  area. There is no perforation or abscess. There is mild wall thickening.  2. There is also focal wall thickening in the proximal to mid sigmoid  colon. This is likely related to the diverticular disease. Sigmoid colon  mass is not ruled out. GI follow-up recommended.  3. There are small retroperitoneal lymph nodes that are probably  reactive. There is also a lymph node in the left pelvis near the  diverticulitis with a short axis diameter of 9 mm it is probably also  reactive.  4. Moderate size hiatal hernia. Gastric wall thickening probably due to  underdistention. Normal appendix. No small bowel distention.  5. Fatty infiltration of the liver. There is a vague 8 mm lesion in the  left hepatic lobe image 19 series 2. There is an 8 mm probable cyst in  the right hepatic lobe image 23 series 2.  6. Spleen size is mildly prominent.  7. There is a new sclerotic bone lesion in the left ilium just above the  left acetabulum. A metastatic bone lesion is considered. Other sclerotic  bone lesions  appear to be stable, likely bone islands  8. Adrenal adenoma on the right. Probable adrenal adenoma on the left,  stable in size.  9. Other nonacute findings, as discussed.     The full report of this exam was immediately signed and available to the  emergency room. The patient is currently in the emergency room.  This report was finalized on 11/09/2022 19:51 by Dr. Rober Duvall MD.          Assessment / Plan     Assessment:   Active Hospital Problems    Diagnosis    • **Acute diverticulitis    • Microcytic anemia    • Lytic bone lesions on xray    • Liver lesion    • PIETER (acute kidney injury) (HCC)    • Back pain    Suspect neoplasm, unknown site    Plan:   1.  Admit as inpatient  2.  Transfer 1 unit packed red blood cells  3.  Gluconate 250 mg IV x1  4.  Pharmacy to dose Levaquin, continue Flagyl  5.  Home medications reviewed and restarted as appropriate  6.  Pain and nausea management  7.  DVT prophylaxis with SCD  8.  Normal saline 75 mill/hour  9.  Supplemental oxygen as needed, continuous pulse oximetry, remote telemetry  10.  Labs in a.m., CEA, LDH, PSA    I discussed the patient's findings and my recommendations with: Nabil Mcrae DO  Time spent 40 minutes    Patient seen and examined by me on 11/9/2022 at 9:04 PM.    Electronically signed by Ember BRIONES. SONAM Hameed, 11/09/22, 21:50 CST.    Electronically signed by Ember Hameed APRN at 11/09/22 2150          Emergency Department Notes      Efren Mccullough MD at 11/09/22 1847          EMERGENCY DEPARTMENT HISTORY AND PHYSICAL EXAM    Patient Name: Andi TREVIÑO    Chief Complaint   Patient presents with   • Urinary Retention       History of Presenting Illness:  Andi TREVIÑO is a 67 y.o. male with history of diverticulosis as well as a history of chronic constipation who presents to the emergency department after being seen in urgent care due to concerns for urinary retention.    Patient states he actually had presented for left lower quadrant  abdominal pain.  Probably was having some difficulty peeing but also states he had not drank much all day.  Denies any history of urinary retention to me.  Has not had any nausea or vomiting associated abdominal pain but does endorse some distention.  States is a chronic history of constipation and takes MiraLAX daily.  Has not had any blood in stool or dark stools.  Denies any fever or chills.  No chest pain or shortness of breath.      Past Medical History:   Past Medical History:   Diagnosis Date   • Anemia    • Diverticulosis    • GERD (gastroesophageal reflux disease)    • Hypertension    • Lumbar back pain        Past Surgical History:   Past Surgical History:   Procedure Laterality Date   • COLONOSCOPY N/A 11/15/2016    Procedure: COLONOSCOPY WITH ANESTHESIA;  Surgeon: Orestes Sams DO;  Location: [facility] ENDOSCOPY;  Service:    • COLONOSCOPY N/A 8/15/2022    Procedure: COLONOSCOPY WITH ANESTHESIA;  Surgeon: Orestes Sams DO;  Location: [facility] ENDOSCOPY;  Service: Gastroenterology;  Laterality: N/A;  preop; abdominal pain  postop; diverticulosis; suboptimal prep;   PCP Peter Keene    • ENDOSCOPY N/A 11/14/2016    Procedure: ESOPHAGOGASTRODUODENOSCOPY WITH ANESTHESIA;  Surgeon: Orestes Sams DO;  Location: [facility] ENDOSCOPY;  Service:    • HERNIA REPAIR     • REPLACEMENT TOTAL KNEE     • TONSILLECTOMY     • UMBILICAL HERNIA REPAIR N/A 5/4/2018    Procedure: OPEN UMBILICAL HERNIA REPAIR WITH MESH;  Surgeon: Augusta Sherman MD;  Location: [facility] OR;  Service: General       Social History:   Denies tobacco  Occasional EtOH  Denies marijuana, cocaine, or IV drugs    Allergies:   No Known Allergies    Medications:     Current Facility-Administered Medications:   •  acetaminophen (TYLENOL) tablet 650 mg, 650 mg, Oral, Q4H PRN, Charla Mcrae DO  •  ferric gluconate (FERRLECIT) 250 mg in sodium chloride 0.9 % 120 mL IVPB, 250 mg, Intravenous, Once, Ember Hameed, APRN  •   "HYDROcodone-acetaminophen (NORCO) 5-325 MG per tablet 1 tablet, 1 tablet, Oral, Q6H PRN, Ember Hameed, APRN  •  [START ON 11/10/2022] metroNIDAZOLE (FLAGYL) IVPB 500 mg, 500 mg, Intravenous, Q8H, Charla Mcrae,   •  nitroglycerin (NITROSTAT) SL tablet 0.4 mg, 0.4 mg, Sublingual, Q5 Min PRN, Charla Mcrae DO  •  ondansetron (ZOFRAN) injection 4 mg, 4 mg, Intravenous, Q6H PRN, Charla Mcrae DO  •  Pharmacy Consult, , Does not apply, Continuous PRN, Ember Hameed, APRN  •  [COMPLETED] Insert peripheral IV, , , Once **AND** sodium chloride 0.9 % flush 10 mL, 10 mL, Intravenous, PRN, Efren Mccullough MD  •  sodium chloride 0.9 % flush 10 mL, 10 mL, Intravenous, Q12H, Charla Mcrae DO  •  sodium chloride 0.9 % flush 10 mL, 10 mL, Intravenous, PRN, Charla Mcrae DO  •  temazepam (RESTORIL) capsule 15 mg, 15 mg, Oral, Nightly PRN, Ember Hameed, APRN    Current Outpatient Medications:   •  cyclobenzaprine (FLEXERIL) 5 MG tablet, TAKE 1 TABLET TWICE A DAY AS NEEDED FOR MUSCLE SPASM, Disp: , Rfl:   •  diclofenac (VOLTAREN) 75 MG EC tablet, , Disp: , Rfl:   •  ferrous sulfate 324 (65 FE) MG tablet delayed-release EC tablet, Take 324 mg by mouth Daily With Breakfast., Disp: , Rfl:   •  hydroCHLOROthiazide (HYDRODIURIL) 12.5 MG tablet, Take 1 tablet by mouth Daily., Disp: , Rfl:   •  Methylcellulose, Laxative, (FIBER THERAPY PO), Take  by mouth 6 (Six) Times a Day., Disp: , Rfl:   •  multivitamin with minerals tablet tablet, Take 1 tablet by mouth Daily., Disp: , Rfl:   •  omeprazole (prilOSEC) 10 MG capsule, Take 1 tablet by mouth Daily., Disp: , Rfl:   •  pantoprazole (PROTONIX) 40 MG EC tablet, Take 40 mg by mouth Every Morning., Disp: , Rfl:     Review of Systems:  A full review of systems was obtained and is negative unless otherwise stated in HPI.    Physical Exam:   VS: /72   Pulse 85   Temp 98 °F (36.7 °C) (Oral)   Resp 20   Ht 170.2 cm (67\")   Wt 74.4 kg (164 " lb)   SpO2 99%   BMI 25.69 kg/m²   GENERAL: Well-appearing middle-age man sitting up in stretcher no acute distress; well nourished, well developed, awake, alert, no acute distress, nontoxic appearing, comfortable  EYES: PERRL, sclera anicteric, extra-occular movements grossly intact, symmetric lids  EARS, NOSE, MOUTH, THROAT: atraumatic external nose and ears, moist mucous membranes  NECK: Symmetric, trachea midline, no thyromegaly, no adenopathy, no meningismus  RESPIRATORY: Unlabored respiratory effort, clear to auscultation bilaterally, good air movement  CARDIOVASCULAR: No murmurs or gallops, peripheral pulses 2+ and equal in all extremities  GI: Soft, mild left lower quadrant abdominal tenderness, distended, bowel sounds present, no hepatosplenomegaly  RECTAL (Chaperone: JOHN Marroquin): Stool guaiac negative, no visualized external hemorrhoids, no palpated internal hemorrhoid  LYMPHATIC: no lymphadenopathy  MUSCULOSKELETAL/EXTREMITIES: Extremities without obvious deformity, no cyanosis or clubbing  SKIN: warm and dry with no obvious rashes  NEUROLOGIC: moving all 4 extremities symmetrically, CN II-XII grossly intact  PSYCHIATRIC: alert, pleasant and cooperative. Appropriate mood and affect.      Labs:   Labs Reviewed   COMPREHENSIVE METABOLIC PANEL - Abnormal; Notable for the following components:       Result Value    Glucose 113 (*)     Creatinine 1.71 (*)     Sodium 135 (*)     Chloride 97 (*)     eGFR 43.1 (*)     All other components within normal limits    Narrative:     GFR Normal >60  Chronic Kidney Disease <60  Kidney Failure <15     LIPASE - Abnormal; Notable for the following components:    Lipase 11 (*)     All other components within normal limits   CBC WITH AUTO DIFFERENTIAL - Abnormal; Notable for the following components:    RBC 2.71 (*)     Hemoglobin 6.5 (*)     Hematocrit 21.1 (*)     MCV 77.9 (*)     MCH 24.0 (*)     MCHC 30.8 (*)     Platelets 518 (*)     Neutrophil % 82.1 (*)     Lymphocyte %  6.4 (*)     Neutrophils, Absolute 8.85 (*)     Lymphocytes, Absolute 0.69 (*)     Monocytes, Absolute 1.00 (*)     All other components within normal limits   CBC WITH AUTO DIFFERENTIAL - Abnormal; Notable for the following components:    RBC 2.67 (*)     Hemoglobin 6.4 (*)     Hematocrit 20.6 (*)     MCV 77.2 (*)     MCH 24.0 (*)     MCHC 31.1 (*)     Platelets 527 (*)     Neutrophil % 82.7 (*)     Lymphocyte % 7.6 (*)     Neutrophils, Absolute 7.61 (*)     All other components within normal limits   FERRITIN - Abnormal; Notable for the following components:    Ferritin 15.88 (*)     All other components within normal limits    Narrative:     Results may be falsely decreased if patient taking Biotin.     IRON PROFILE - Abnormal; Notable for the following components:    Iron 9 (*)     Iron Saturation 3 (*)     Transferrin 198 (*)     TIBC 295 (*)     All other components within normal limits   URINALYSIS W/ CULTURE IF INDICATED - Normal    Narrative:     In absence of clinical symptoms, the presence of pyuria, bacteria, and/or nitrites on the urinalysis result does not correlate with infection.  Urine microscopic not indicated.   LACTIC ACID, PLASMA - Normal   LACTATE DEHYDROGENASE - Normal   RETICULOCYTES - Normal   POCT OCCULT BLOOD STOOL - Normal   BLOOD CULTURE   BLOOD CULTURE   RAINBOW DRAW    Narrative:     The following orders were created for panel order [place] [place] Draw.  Procedure                               Abnormality         Status                     ---------                               -----------         ------                     Green Top (Gel)[[id number]]                                  Final result               Lavender Top[[id number]]                                     Final result               Red Top[[id number]]                                          Final result               Light Blue Top[[id number]]                                   Final result                 Please view results for  these tests on the individual orders.   VITAMIN B12   OCCULT BLOOD X 1, STOOL   CEA   PSA SCREEN   TYPE AND SCREEN   PREPARE RBC   PREPARE RBC   GREEN TOP   LAVENDER TOP   RED TOP   LIGHT BLUE TOP   CBC AND DIFFERENTIAL    Narrative:     The following orders were created for panel order CBC & Differential.  Procedure                               Abnormality         Status                     ---------                               -----------         ------                     CBC Auto Differential[[id number]]        Abnormal            Final result                 Please view results for these tests on the individual orders.   CBC AND DIFFERENTIAL    Narrative:     The following orders were created for panel order CBC & Differential.  Procedure                               Abnormality         Status                     ---------                               -----------         ------                     CBC Auto Differential[[id number]]        Abnormal            Final result                 Please view results for these tests on the individual orders.         Radiology:  CT Abdomen Pelvis Without Contrast   Final Result   1. There is diverticulitis of the mid to distal descending colon with   stranding of the adjacent fat. There are multiple diverticula in the   area. There is no perforation or abscess. There is mild wall thickening.   2. There is also focal wall thickening in the proximal to mid sigmoid   colon. This is likely related to the diverticular disease. Sigmoid colon   mass is not ruled out. GI follow-up recommended.   3. There are small retroperitoneal lymph nodes that are probably   reactive. There is also a lymph node in the left pelvis near the   diverticulitis with a short axis diameter of 9 mm it is probably also   reactive.   4. Moderate size hiatal hernia. Gastric wall thickening probably due to   underdistention. Normal appendix. No small bowel distention.   5. Fatty infiltration of the liver.  There is a vague 8 mm lesion in the   left hepatic lobe image 19 series 2. There is an 8 mm probable cyst in   the right hepatic lobe image 23 series 2.   6. Spleen size is mildly prominent.   7. There is a new sclerotic bone lesion in the left ilium just above the   left acetabulum. A metastatic bone lesion is considered. Other sclerotic   bone lesions appear to be stable, likely bone islands   8. Adrenal adenoma on the right. Probable adrenal adenoma on the left,   stable in size.   9. Other nonacute findings, as discussed.       The full report of this exam was immediately signed and available to the   emergency room. The patient is currently in the emergency room.   This report was finalized on 11/09/2022 19:51 by Dr. Rober Duvall MD.          Medical Decision Making:  Andi TREVIÑO is a 67 y.o. male presents emergency department due to left lower quadrant abdominal pain in the setting of a history of chronic constipation after being seen in urgent care who was concerned for urinary retention.      Reassuring vital signs on arrival.    Bladder scan was performed following urination and postvoid residual volume <125mL not consistent with urinary retention.    Labs were ordered and reviewed.  Labs notable for elevated creatinine to 1.71 patient's baseline around 1-1.2 consistent with acute kidney injury.  Notable anemia to 6.5 with patient's last hemoglobin in our system within the normal range.  No leukocytosis.  Urinalysis with no evidence of infection.    Imaging was ordered and reviewed.  CT abdomen pelvis notable for diverticulitis of the mid to distal descending colon with stranding of the adjacent fat.  No perforation or abscess but some wall thickening.  Also focal wall thickening in the proximal to mid sigmoid colon related to diverticular disease.    Nursing notes were reviewed.    The patient was given IV fluids, IV Flagyl, IV morphine, and packed red blood cells.    Patient's presentation is most  consistent with left lower quadrant abdominal pain in setting of acute diverticulitis.  Unclear exact cause of his acute anemia at this time.  There is concern for possible sigmoid mass but patient is not having any rectal bleeding to explain his acute anemia.  No melena.  We will consider iron deficiency anemia as well as anemia of chronic disease.  No signs of GI bleed.  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  Vital signs not concerning for sepsis.  Acute kidney injury most likely secondary to dehydration in setting of decreased p.o. intake in the setting of the symptoms.    Patient was admitted to the hospitalist for further management.      ED Diagnosis:  Acute kidney injury  Acute anemia  Abdominal pain, acute, left lower quadrant  Chronic constipation      Disposition: admit to hospitalist        Signed:  Efren Mccullough MD  Emergency Medicine Physician    Please note that portions of this note were completed with a voice recognition program.      Efren Mccullough MD  11/09/22 2141      Electronically signed by Efren Mccullough MD at 11/09/22 2141       Vital Signs (last 2 days)     Date/Time Temp Temp src Pulse Resp BP Patient Position SpO2    11/10/22 1100 98.3 (36.8) Oral 76 16 107/64 Lying 96    11/10/22 0856 99.4 (37.4) Oral 99 16 102/70 Lying 95    11/10/22 0442 99 (37.2) Oral 83 16 97/57 Lying 94    11/10/22 0103 97.8 (36.6) -- 78 16 133/68 Lying 95    11/09/22 2341 99.5 (37.5) Oral 82 18 116/57 Lying 93    11/09/22 2238 99.2 (37.3) -- 81 18 113/67 -- --    11/09/22 2224 99 (37.2) -- 85 16 122/69 -- --    11/09/22 21:55:09 99.3 (37.4) Oral 80 16 117/85 -- 97    11/09/22 1714 98 (36.7) Oral 85 20 128/72 -- 99        Intake & Output (last 2 days)       11/08 0701  11/09 0700 11/09 0701  11/10 0700 11/10 0701 11/11 0700    P.O.   240    I.V. (mL/kg)  500 (6.7)     Blood  310     IV Piggyback  1100     Total Intake(mL/kg)  1910 (25.5) 240 (3.2)    Urine (mL/kg/hr)  650      Total Output  650     Net  +1260 +240           Urine Unmeasured Occurrence  1 x           Facility-Administered Medications as of 11/10/2022   Medication Dose Route Frequency Provider Last Rate Last Admin   • acetaminophen (TYLENOL) tablet 650 mg  650 mg Oral Q4H PRN Charla Mcrae DO   650 mg at 11/10/22 1232   • cyclobenzaprine (FLEXERIL) tablet 5 mg  5 mg Oral TID PRN Ember Hameed, APRN       • [COMPLETED] ferric gluconate (FERRLECIT) 250 mg in sodium chloride 0.9 % 120 mL IVPB  250 mg Intravenous Once Ember Hameed, APRN 60 mL/hr at 11/09/22 2335 250 mg at 11/09/22 2335   • HYDROcodone-acetaminophen (NORCO) 5-325 MG per tablet 1 tablet  1 tablet Oral Q6H PRN Ember Hameed, APRN       • [COMPLETED] lactated ringers bolus 1,000 mL  1,000 mL Intravenous Once Efren Mccullough MD   Stopped at 11/09/22 2131   • levoFLOXacin (LEVAQUIN) 750 mg/150 mL D5W (premix) (LEVAQUIN) 750 mg  750 mg Intravenous Q24H Alhaji Ramirez DO       • [COMPLETED] metroNIDAZOLE (FLAGYL) IVPB 500 mg  500 mg Intravenous Once Efren Mccullough MD   Stopped at 11/09/22 2150   • metroNIDAZOLE (FLAGYL) IVPB 500 mg  500 mg Intravenous Q8H Charla Mcrae DO   500 mg at 11/10/22 1226   • nitroglycerin (NITROSTAT) SL tablet 0.4 mg  0.4 mg Sublingual Q5 Min PRN Charla Mcrae DO       • ondansetron (ZOFRAN) injection 4 mg  4 mg Intravenous Q6H PRN Charla Mcrae DO       • pantoprazole (PROTONIX) EC tablet 40 mg  40 mg Oral QAM Ember Hameed, APRN   40 mg at 11/10/22 0900   • Pharmacy to Dose Levaquin   Does not apply Continuous PRN Alhaji Ramirez DO       • sodium chloride 0.9 % flush 10 mL  10 mL Intravenous PRN Efren Mccullough MD       • sodium chloride 0.9 % flush 10 mL  10 mL Intravenous Q12H Charla Mcrae DO   10 mL at 11/10/22 0901   • sodium chloride 0.9 % flush 10 mL  10 mL Intravenous PRN Charla Mcrae DO       • sodium chloride 0.9 % infusion  75 mL/hr Intravenous  Continuous Ember Hameed, SONAM 75 mL/hr at 11/09/22 2335 75 mL/hr at 11/09/22 2335   • temazepam (RESTORIL) capsule 15 mg  15 mg Oral Nightly PRN Ember Hameed, APRMIGDALIA           Orders (last 48 hrs)      Start     Ordered    11/10/22 2230  levoFLOXacin (LEVAQUIN) 750 mg/150 mL D5W (premix) (LEVAQUIN) 750 mg  Every 24 Hours         11/10/22 1022    11/10/22 0800  Oral Care  2 Times Daily       11/09/22 2112    11/10/22 0700  pantoprazole (PROTONIX) EC tablet 40 mg  Every Morning         11/09/22 2148    11/10/22 0600  CBC Auto Differential  Morning Draw         11/09/22 2112    11/10/22 0600  Comprehensive Metabolic Panel  Morning Draw         11/09/22 2112    11/10/22 0600  Occult Blood X 1, Stool - Stool, Per Rectum  Once         11/09/22 2112    11/10/22 0500  metroNIDAZOLE (FLAGYL) IVPB 500 mg  Every 8 Hours         11/09/22 2113    11/10/22 0000  Vital Signs  Every 4 Hours       11/09/22 2112    11/10/22 0000  Pulse Oximetry,  Spot  Every 4 Hours       11/09/22 2112 11/09/22 2150  sodium chloride 0.9 % infusion  Continuous         11/09/22 2148 11/09/22 2148  cyclobenzaprine (FLEXERIL) tablet 5 mg  3 Times Daily PRN         11/09/22 2148 11/09/22 2142  levoFLOXacin (LEVAQUIN) 750 mg/150 mL D5W (premix) (LEVAQUIN) 750 mg  Every 48 Hours,   Status:  Discontinued         11/09/22 2140 11/09/22 2141  ferric gluconate (FERRLECIT) 250 mg in sodium chloride 0.9 % 120 mL IVPB  Once         11/09/22 2139 11/09/22 2132  Continuous pulse oximetry  Continuous         11/09/22 2132 11/09/22 2131  HYDROcodone-acetaminophen (NORCO) 5-325 MG per tablet 1 tablet  Every 6 Hours PRN         11/09/22 2131 11/09/22 2130  Pharmacy to Dose Levaquin  Continuous PRN         11/09/22 2131 11/09/22 2129  PSA Screen  Once         11/09/22 2128 11/09/22 2114  sodium chloride 0.9 % flush 10 mL  Every 12 Hours Scheduled         11/09/22 2112 11/09/22 2113  Daily Weights  Daily       11/09/22 2112     11/09/22 2112  ondansetron (ZOFRAN) injection 4 mg  Every 6 Hours PRN         11/09/22 2112    11/09/22 2112  Prepare RBC, 1 Units  Blood - Once         11/09/22 2112 11/09/22 2112  temazepam (RESTORIL) capsule 15 mg  Nightly PRN         11/09/22 2112 11/09/22 2111  acetaminophen (TYLENOL) tablet 650 mg  Every 4 Hours PRN         11/09/22 2112 11/09/22 2111  Vitamin B12  STAT         11/09/22 2110 11/09/22 2111  Ferritin  STAT         11/09/22 2110 11/09/22 2111  Iron Profile  STAT         11/09/22 2110 11/09/22 2111  Occult Blood X 1, Stool - Stool, Per Rectum  Once         11/09/22 2110 11/09/22 2111  Lactate Dehydrogenase  STAT         11/09/22 2110 11/09/22 2111  Reticulocytes  STAT         11/09/22 2110 11/09/22 2111  CEA  Once         11/09/22 2110 11/09/22 2109  Fall Precautions  Continuous         11/09/22 2112 11/09/22 2109  Diet Regular  Diet Effective Now         11/09/22 2112 11/09/22 2108  Intake & Output  Every Shift       11/09/22 2112 11/09/22 2108  Weigh Patient  Once         11/09/22 2112 11/09/22 2108  Oxygen Therapy- Nasal Cannula; Titrate for SPO2: 90% - 95%  Continuous         11/09/22 2112 11/09/22 2108  Insert Peripheral IV  Once         11/09/22 2112 11/09/22 2108  Saline Lock & Maintain IV Access  Continuous,   Status:  Canceled         11/09/22 2112 11/09/22 2108  Verify Informed Consent for Blood Product Administration  Once         11/09/22 2112 11/09/22 2108  Code Status and Medical Interventions:  Continuous         11/09/22 2112 11/09/22 2108  Place Sequential Compression Device  Once         11/09/22 2112 11/09/22 2108  Maintain Sequential Compression Device  Continuous         11/09/22 2112 11/09/22 2108  Telemetry - Maintain IV Access  Continuous         11/09/22 2112 11/09/22 2108  Continuous Cardiac Monitoring  Continuous        Comments: Follow Standing Orders As Outlined in Process Instructions (Open Order Report  to View Full Instructions)    11/09/22 2112 11/09/22 2108  May Be Off Telemetry for Tests  Continuous         11/09/22 2112 11/09/22 2108  Notify Provider if ACLS Protocol Activated  Until Discontinued         11/09/22 2112 11/09/22 2107  Telemetry - Pulse Oximetry  Continuous PRN,   Status:  Canceled      Comments: If Patient Develops Unresponsiveness, Acute Dyspnea, Cyanosis or Suspected Hypoxemia Start Continuous Pulse Ox Monitoring, Apply Oxygen & Notify Provider    11/09/22 2112 11/09/22 2107  nitroglycerin (NITROSTAT) SL tablet 0.4 mg  Every 5 Minutes PRN         11/09/22 2112 11/09/22 2107  sodium chloride 0.9 % flush 10 mL  As Needed         11/09/22 2112 11/09/22 2105  Inpatient Admission  Once         11/09/22 2105 11/09/22 2038  POC Occult Blood Stool  Once         11/09/22 2037 11/09/22 2012  metroNIDAZOLE (FLAGYL) IVPB 500 mg  Once         11/09/22 2010 11/09/22 2011  Lactic Acid, Plasma  Once         11/09/22 2010 11/09/22 2011  Blood Culture - Blood, Arm, Left  Once         11/09/22 2010 11/09/22 2011  Blood Culture - Blood, Arm, Right  Once         11/09/22 2010 11/1954  lactated ringers bolus 1,000 mL  Once         11/09/22 1952 11/09/22 1953  Verify Informed Consent for Blood Product Administration  Once         11/09/22 1952 11/09/22 1953  Prepare RBC, 1 Units  Blood - Once         11/09/22 1952 11/09/22 1952  Transfuse RBC Infuse Each Unit Over: 3.5H  Transfusion         11/09/22 1952 11/09/22 1915  CBC & Differential  Once         11/09/22 1914 11/09/22 1915  CBC Auto Differential  PROCEDURE ONCE         11/09/22 1914 11/09/22 1915  Type & Screen  Once         11/09/22 1914 11/09/22 1913  CT Abdomen Pelvis Without Contrast  1 Time Imaging        Comments: NON-CONTRASTED STUDY      11/09/22 1912    11/09/22 1848  Get bladder scan after attempts urination, if >250mL place aparicio catheter  Nursing Communication  Once        Comments: Get  bladder scan after attempts urination, if >250mL place aparicio catheter    11/09/22 1847    11/09/22 1840  CBC Auto Differential  Once         11/09/22 1839    11/09/22 1833  CBC & Differential  Once         11/09/22 1832    11/09/22 1833  Comprehensive Metabolic Panel  Once         11/09/22 1832    11/09/22 1833  Lipase  Once         11/09/22 1832    11/09/22 1833  Insert peripheral IV  Once        See Hyperspace for full Linked Orders Report.    11/09/22 1832    11/09/22 1832  sodium chloride 0.9 % flush 10 mL  As Needed        See Hyperspace for full Linked Orders Report.    11/09/22 1832    11/09/22 1825  Urinalysis With Culture If Indicated - Urine, Clean Catch  Once         11/09/22 1824    11/09/22 1744  [facility] Draw  Once         11/09/22 1743    11/09/22 1744  Green Top (Gel)  PROCEDURE ONCE         11/09/22 1743    11/09/22 1744  Lavender Top  PROCEDURE ONCE         11/09/22 1743    11/09/22 1744  Red Top  PROCEDURE ONCE         11/09/22 1743    11/09/22 1744  Light Blue Top  PROCEDURE ONCE         11/09/22 1743    Unscheduled  Oxygen Therapy- Nasal Cannula; Titrate for SPO2: 90% - 95%  Continuous PRN      Comments: If Patient Develops Unresponsiveness, Acute Dyspnea, Cyanosis or Suspected Hypoxemia Start Continuous Pulse Ox Monitoring, Apply Oxygen & Notify Provider    11/09/22 2112    Unscheduled  ECG 12 Lead  As Needed      Comments: Nurse to Release if Patient Expericences Acute Chest Pain or Dysrhythmias    11/09/22 2112    Unscheduled  Potassium  As Needed      Comments: For Ventricular Arrhythmias      11/09/22 2112    Unscheduled  Magnesium  As Needed      Comments: For Ventricular Arrhythmias      11/09/22 2112    Unscheduled  Troponin  As Needed      Comments: For Chest Pain      11/09/22 2112    Unscheduled  Blood Gas, Arterial -  As Needed      Comments: Draw for Acute Dyspnea, Cyanosis, Suspected Hypoxemia or UnresponsivenessNotify Provider of Results      11/09/22 2112    Unscheduled  ACLS  Protocol For Life Threatening Dysrhythmias (Unless Code Status Indicates Otherwise)  As Needed       11/09/22 2112    Unscheduled  Up With Assistance  As Needed       11/09/22 2112    Unscheduled  Up in Chair  As Needed       11/09/22 2112    Unscheduled  Transfuse RBC, 1 Units Infuse Each Unit Over: 3.5H  Transfusion         11/09/22 2112    --  SCANNED - TELEMETRY           11/09/22 0000    --  cyclobenzaprine (FLEXERIL) 5 MG tablet  2 Times Daily PRN         11/10/22 111

## 2022-11-10 NOTE — PROGRESS NOTES
"Pharmacy Dosing Service  Antimicrobial Dosing  Levaquin    Assessment/Action/Plan:  Based on indication and renal function, Levaquin adjusted to 750 mg IV every 24 hours. Pharmacy will continue to monitor daily and make further adjustment(s) accordingly.     Subjective:  Andi TREVIÑO is a 67 y.o. male with a  \"Pharmacy to Dose Levaquin\" consult for the treatment of diverticulitis , day 2 of 7 of treatment.    Objective:  Ht: 170.2 cm (67\"); Wt: 75 kg (165 lb 6.4 oz)  Estimated Creatinine Clearance: 62 mL/min (by C-G formula based on SCr of 1.21 mg/dL).   Creatinine   Date Value Ref Range Status   11/10/2022 1.21 0.76 - 1.27 mg/dL Final   11/09/2022 1.71 (H) 0.76 - 1.27 mg/dL Final   04/30/2018 1.21 0.50 - 1.40 mg/dL Final      Lab Results   Component Value Date    WBC 6.62 11/10/2022    WBC 9.21 11/09/2022    WBC 10.77 11/09/2022      Baseline culture results:  Microbiology Results (last 10 days)       ** No results found for the last 240 hours. **            Ebenezer Mitchell, PharmD  11/10/22 10:23 CST    "

## 2022-11-10 NOTE — ED PROVIDER NOTES
EMERGENCY DEPARTMENT HISTORY AND PHYSICAL EXAM    Patient Name: Andi KEENE    Chief Complaint   Patient presents with   • Urinary Retention       History of Presenting Illness:  Andi KEENE is a 67 y.o. male with history of diverticulosis as well as a history of chronic constipation who presents to the emergency department after being seen in urgent care due to concerns for urinary retention.    Patient states he actually had presented for left lower quadrant abdominal pain.  Probably was having some difficulty peeing but also states he had not drank much all day.  Denies any history of urinary retention to me.  Has not had any nausea or vomiting associated abdominal pain but does endorse some distention.  States is a chronic history of constipation and takes MiraLAX daily.  Has not had any blood in stool or dark stools.  Denies any fever or chills.  No chest pain or shortness of breath.      Past Medical History:   Past Medical History:   Diagnosis Date   • Anemia    • Diverticulosis    • GERD (gastroesophageal reflux disease)    • Hypertension    • Lumbar back pain        Past Surgical History:   Past Surgical History:   Procedure Laterality Date   • COLONOSCOPY N/A 11/15/2016    Procedure: COLONOSCOPY WITH ANESTHESIA;  Surgeon: Orestes Sams DO;  Location: [facility] ENDOSCOPY;  Service:    • COLONOSCOPY N/A 8/15/2022    Procedure: COLONOSCOPY WITH ANESTHESIA;  Surgeon: Orestes Sams DO;  Location: [facility] ENDOSCOPY;  Service: Gastroenterology;  Laterality: N/A;  preop; abdominal pain  postop; diverticulosis; suboptimal prep;   PCP Peter Keene    • ENDOSCOPY N/A 11/14/2016    Procedure: ESOPHAGOGASTRODUODENOSCOPY WITH ANESTHESIA;  Surgeon: Orestes Sams DO;  Location: [facility] ENDOSCOPY;  Service:    • HERNIA REPAIR     • REPLACEMENT TOTAL KNEE     • TONSILLECTOMY     • UMBILICAL HERNIA REPAIR N/A 5/4/2018    Procedure: OPEN UMBILICAL HERNIA REPAIR WITH MESH;  Surgeon: Augusta Sherman MD;  Location:   [facility] OR;  Service: General       Social History:   Denies tobacco  Occasional EtOH  Denies marijuana, cocaine, or IV drugs    Allergies:   No Known Allergies    Medications:     Current Facility-Administered Medications:   •  acetaminophen (TYLENOL) tablet 650 mg, 650 mg, Oral, Q4H PRN, Charla Mcrae DO  •  ferric gluconate (FERRLECIT) 250 mg in sodium chloride 0.9 % 120 mL IVPB, 250 mg, Intravenous, Once, Ember Hameed, APRN  •  HYDROcodone-acetaminophen (NORCO) 5-325 MG per tablet 1 tablet, 1 tablet, Oral, Q6H PRN, Ember Hameed, APRN  •  [START ON 11/10/2022] metroNIDAZOLE (FLAGYL) IVPB 500 mg, 500 mg, Intravenous, Q8H, Charla Mcrae DO  •  nitroglycerin (NITROSTAT) SL tablet 0.4 mg, 0.4 mg, Sublingual, Q5 Min PRN, Charla Mcrae DO  •  ondansetron (ZOFRAN) injection 4 mg, 4 mg, Intravenous, Q6H PRN, Charla Mcrae DO  •  Pharmacy Consult, , Does not apply, Continuous PRN, Ember Hameed, APRN  •  [COMPLETED] Insert peripheral IV, , , Once **AND** sodium chloride 0.9 % flush 10 mL, 10 mL, Intravenous, PRN, Efren Mccullough MD  •  sodium chloride 0.9 % flush 10 mL, 10 mL, Intravenous, Q12H, Charla Mcrae DO  •  sodium chloride 0.9 % flush 10 mL, 10 mL, Intravenous, PRN, Charla Mcrae DO  •  temazepam (RESTORIL) capsule 15 mg, 15 mg, Oral, Nightly PRN, Ember Hameed, APRN    Current Outpatient Medications:   •  cyclobenzaprine (FLEXERIL) 5 MG tablet, TAKE 1 TABLET TWICE A DAY AS NEEDED FOR MUSCLE SPASM, Disp: , Rfl:   •  diclofenac (VOLTAREN) 75 MG EC tablet, , Disp: , Rfl:   •  ferrous sulfate 324 (65 FE) MG tablet delayed-release EC tablet, Take 324 mg by mouth Daily With Breakfast., Disp: , Rfl:   •  hydroCHLOROthiazide (HYDRODIURIL) 12.5 MG tablet, Take 1 tablet by mouth Daily., Disp: , Rfl:   •  Methylcellulose, Laxative, (FIBER THERAPY PO), Take  by mouth 6 (Six) Times a Day., Disp: , Rfl:   •  multivitamin with minerals tablet tablet, Take 1 tablet by  "mouth Daily., Disp: , Rfl:   •  omeprazole (prilOSEC) 10 MG capsule, Take 1 tablet by mouth Daily., Disp: , Rfl:   •  pantoprazole (PROTONIX) 40 MG EC tablet, Take 40 mg by mouth Every Morning., Disp: , Rfl:     Review of Systems:  A full review of systems was obtained and is negative unless otherwise stated in HPI.    Physical Exam:   VS: /72   Pulse 85   Temp 98 °F (36.7 °C) (Oral)   Resp 20   Ht 170.2 cm (67\")   Wt 74.4 kg (164 lb)   SpO2 99%   BMI 25.69 kg/m²   GENERAL: Well-appearing middle-age man sitting up in stretcher no acute distress; well nourished, well developed, awake, alert, no acute distress, nontoxic appearing, comfortable  EYES: PERRL, sclera anicteric, extra-occular movements grossly intact, symmetric lids  EARS, NOSE, MOUTH, THROAT: atraumatic external nose and ears, moist mucous membranes  NECK: Symmetric, trachea midline, no thyromegaly, no adenopathy, no meningismus  RESPIRATORY: Unlabored respiratory effort, clear to auscultation bilaterally, good air movement  CARDIOVASCULAR: No murmurs or gallops, peripheral pulses 2+ and equal in all extremities  GI: Soft, mild left lower quadrant abdominal tenderness, distended, bowel sounds present, no hepatosplenomegaly  RECTAL (Chaperone: JOHN Marroquin): Stool guaiac negative, no visualized external hemorrhoids, no palpated internal hemorrhoid  LYMPHATIC: no lymphadenopathy  MUSCULOSKELETAL/EXTREMITIES: Extremities without obvious deformity, no cyanosis or clubbing  SKIN: warm and dry with no obvious rashes  NEUROLOGIC: moving all 4 extremities symmetrically, CN II-XII grossly intact  PSYCHIATRIC: alert, pleasant and cooperative. Appropriate mood and affect.      Labs:   Labs Reviewed   COMPREHENSIVE METABOLIC PANEL - Abnormal; Notable for the following components:       Result Value    Glucose 113 (*)     Creatinine 1.71 (*)     Sodium 135 (*)     Chloride 97 (*)     eGFR 43.1 (*)     All other components within normal limits    Narrative:     " GFR Normal >60  Chronic Kidney Disease <60  Kidney Failure <15     LIPASE - Abnormal; Notable for the following components:    Lipase 11 (*)     All other components within normal limits   CBC WITH AUTO DIFFERENTIAL - Abnormal; Notable for the following components:    RBC 2.71 (*)     Hemoglobin 6.5 (*)     Hematocrit 21.1 (*)     MCV 77.9 (*)     MCH 24.0 (*)     MCHC 30.8 (*)     Platelets 518 (*)     Neutrophil % 82.1 (*)     Lymphocyte % 6.4 (*)     Neutrophils, Absolute 8.85 (*)     Lymphocytes, Absolute 0.69 (*)     Monocytes, Absolute 1.00 (*)     All other components within normal limits   CBC WITH AUTO DIFFERENTIAL - Abnormal; Notable for the following components:    RBC 2.67 (*)     Hemoglobin 6.4 (*)     Hematocrit 20.6 (*)     MCV 77.2 (*)     MCH 24.0 (*)     MCHC 31.1 (*)     Platelets 527 (*)     Neutrophil % 82.7 (*)     Lymphocyte % 7.6 (*)     Neutrophils, Absolute 7.61 (*)     All other components within normal limits   FERRITIN - Abnormal; Notable for the following components:    Ferritin 15.88 (*)     All other components within normal limits    Narrative:     Results may be falsely decreased if patient taking Biotin.     IRON PROFILE - Abnormal; Notable for the following components:    Iron 9 (*)     Iron Saturation 3 (*)     Transferrin 198 (*)     TIBC 295 (*)     All other components within normal limits   URINALYSIS W/ CULTURE IF INDICATED - Normal    Narrative:     In absence of clinical symptoms, the presence of pyuria, bacteria, and/or nitrites on the urinalysis result does not correlate with infection.  Urine microscopic not indicated.   LACTIC ACID, PLASMA - Normal   LACTATE DEHYDROGENASE - Normal   RETICULOCYTES - Normal   POCT OCCULT BLOOD STOOL - Normal   BLOOD CULTURE   BLOOD CULTURE   RAINBOW DRAW    Narrative:     The following orders were created for panel order [place] Draw.  Procedure                               Abnormality         Status                     ---------                                -----------         ------                     Green Top (Gel)[[id number]]                                  Final result               Lavender Top[[id number]]                                     Final result               Red Top[[id number]]                                          Final result               Light Blue Top[[id number]]                                   Final result                 Please view results for these tests on the individual orders.   VITAMIN B12   OCCULT BLOOD X 1, STOOL   CEA   PSA SCREEN   TYPE AND SCREEN   PREPARE RBC   PREPARE RBC   GREEN TOP   LAVENDER TOP   RED TOP   LIGHT BLUE TOP   CBC AND DIFFERENTIAL    Narrative:     The following orders were created for panel order CBC & Differential.  Procedure                               Abnormality         Status                     ---------                               -----------         ------                     CBC Auto Differential[[id number]]        Abnormal            Final result                 Please view results for these tests on the individual orders.   CBC AND DIFFERENTIAL    Narrative:     The following orders were created for panel order CBC & Differential.  Procedure                               Abnormality         Status                     ---------                               -----------         ------                     CBC Auto Differential[[id number]]        Abnormal            Final result                 Please view results for these tests on the individual orders.         Radiology:  CT Abdomen Pelvis Without Contrast   Final Result   1. There is diverticulitis of the mid to distal descending colon with   stranding of the adjacent fat. There are multiple diverticula in the   area. There is no perforation or abscess. There is mild wall thickening.   2. There is also focal wall thickening in the proximal to mid sigmoid   colon. This is likely related to the diverticular disease. Sigmoid colon    mass is not ruled out. GI follow-up recommended.   3. There are small retroperitoneal lymph nodes that are probably   reactive. There is also a lymph node in the left pelvis near the   diverticulitis with a short axis diameter of 9 mm it is probably also   reactive.   4. Moderate size hiatal hernia. Gastric wall thickening probably due to   underdistention. Normal appendix. No small bowel distention.   5. Fatty infiltration of the liver. There is a vague 8 mm lesion in the   left hepatic lobe image 19 series 2. There is an 8 mm probable cyst in   the right hepatic lobe image 23 series 2.   6. Spleen size is mildly prominent.   7. There is a new sclerotic bone lesion in the left ilium just above the   left acetabulum. A metastatic bone lesion is considered. Other sclerotic   bone lesions appear to be stable, likely bone islands   8. Adrenal adenoma on the right. Probable adrenal adenoma on the left,   stable in size.   9. Other nonacute findings, as discussed.       The full report of this exam was immediately signed and available to the   emergency room. The patient is currently in the emergency room.   This report was finalized on 11/09/2022 19:51 by Dr. Rober Duvall MD.          Medical Decision Making:  Andi TREVIÑO is a 67 y.o. male presents emergency department due to left lower quadrant abdominal pain in the setting of a history of chronic constipation after being seen in urgent care who was concerned for urinary retention.      Reassuring vital signs on arrival.    Bladder scan was performed following urination and postvoid residual volume <125mL not consistent with urinary retention.    Labs were ordered and reviewed.  Labs notable for elevated creatinine to 1.71 patient's baseline around 1-1.2 consistent with acute kidney injury.  Notable anemia to 6.5 with patient's last hemoglobin in our system within the normal range.  No leukocytosis.  Urinalysis with no evidence of infection.    Imaging was ordered  and reviewed.  CT abdomen pelvis notable for diverticulitis of the mid to distal descending colon with stranding of the adjacent fat.  No perforation or abscess but some wall thickening.  Also focal wall thickening in the proximal to mid sigmoid colon related to diverticular disease.    Nursing notes were reviewed.    The patient was given IV fluids, IV Flagyl, IV morphine, and packed red blood cells.    Patient's presentation is most consistent with left lower quadrant abdominal pain in setting of acute diverticulitis.  Unclear exact cause of his acute anemia at this time.  There is concern for possible sigmoid mass but patient is not having any rectal bleeding to explain his acute anemia.  No melena.  We will consider iron deficiency anemia as well as anemia of chronic disease.  No signs of GI bleed.  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  Vital signs not concerning for sepsis.  Acute kidney injury most likely secondary to dehydration in setting of decreased p.o. intake in the setting of the symptoms.    Patient was admitted to the hospitalist for further management.      ED Diagnosis:  Acute kidney injury  Acute anemia  Abdominal pain, acute, left lower quadrant  Chronic constipation      Disposition: admit to hospitalist        Signed:  Efren Mccullough MD  Emergency Medicine Physician    Please note that portions of this note were completed with a voice recognition program.      Efren Mccullough MD  11/09/22 [id number]

## 2022-11-11 VITALS
RESPIRATION RATE: 18 BRPM | BODY MASS INDEX: 26.09 KG/M2 | OXYGEN SATURATION: 100 % | TEMPERATURE: 99.2 F | SYSTOLIC BLOOD PRESSURE: 97 MMHG | HEART RATE: 78 BPM | HEIGHT: 67 IN | WEIGHT: 166.2 LBS | DIASTOLIC BLOOD PRESSURE: 57 MMHG

## 2022-11-11 LAB
ANION GAP SERPL CALCULATED.3IONS-SCNC: 9 MMOL/L (ref 5–15)
BASOPHILS # BLD AUTO: 0.02 10*3/MM3 (ref 0–0.2)
BASOPHILS NFR BLD AUTO: 0.2 % (ref 0–1.5)
BH BB BLOOD EXPIRATION DATE: NORMAL
BH BB BLOOD EXPIRATION DATE: NORMAL
BH BB BLOOD TYPE BARCODE: 5100
BH BB BLOOD TYPE BARCODE: 5100
BH BB DISPENSE STATUS: NORMAL
BH BB DISPENSE STATUS: NORMAL
BH BB PRODUCT CODE: NORMAL
BH BB PRODUCT CODE: NORMAL
BH BB UNIT NUMBER: NORMAL
BH BB UNIT NUMBER: NORMAL
BUN SERPL-MCNC: 8 MG/DL (ref 8–23)
BUN/CREAT SERPL: 8.5 (ref 7–25)
CALCIUM SPEC-SCNC: 7.7 MG/DL (ref 8.6–10.5)
CHLORIDE SERPL-SCNC: 106 MMOL/L (ref 98–107)
CO2 SERPL-SCNC: 22 MMOL/L (ref 22–29)
CREAT SERPL-MCNC: 0.94 MG/DL (ref 0.76–1.27)
CROSSMATCH INTERPRETATION: NORMAL
CROSSMATCH INTERPRETATION: NORMAL
DEPRECATED RDW RBC AUTO: 44.9 FL (ref 37–54)
EGFRCR SERPLBLD CKD-EPI 2021: 88.3 ML/MIN/1.73
EOSINOPHIL # BLD AUTO: 0.25 10*3/MM3 (ref 0–0.4)
EOSINOPHIL NFR BLD AUTO: 3 % (ref 0.3–6.2)
ERYTHROCYTE [DISTWIDTH] IN BLOOD BY AUTOMATED COUNT: 15.7 % (ref 12.3–15.4)
GLUCOSE SERPL-MCNC: 91 MG/DL (ref 65–99)
HCT VFR BLD AUTO: 24.3 % (ref 37.5–51)
HEMOCCULT STL QL: POSITIVE
HGB BLD-MCNC: 7.7 G/DL (ref 13–17.7)
IMM GRANULOCYTES # BLD AUTO: 0.04 10*3/MM3 (ref 0–0.05)
IMM GRANULOCYTES NFR BLD AUTO: 0.5 % (ref 0–0.5)
LYMPHOCYTES # BLD AUTO: 0.87 10*3/MM3 (ref 0.7–3.1)
LYMPHOCYTES NFR BLD AUTO: 10.3 % (ref 19.6–45.3)
MCH RBC QN AUTO: 25.5 PG (ref 26.6–33)
MCHC RBC AUTO-ENTMCNC: 31.7 G/DL (ref 31.5–35.7)
MCV RBC AUTO: 80.5 FL (ref 79–97)
MONOCYTES # BLD AUTO: 1.03 10*3/MM3 (ref 0.1–0.9)
MONOCYTES NFR BLD AUTO: 12.2 % (ref 5–12)
NEUTROPHILS NFR BLD AUTO: 6.26 10*3/MM3 (ref 1.7–7)
NEUTROPHILS NFR BLD AUTO: 73.8 % (ref 42.7–76)
NRBC BLD AUTO-RTO: 0 /100 WBC (ref 0–0.2)
PLATELET # BLD AUTO: 400 10*3/MM3 (ref 140–450)
PMV BLD AUTO: 8.7 FL (ref 6–12)
POTASSIUM SERPL-SCNC: 4 MMOL/L (ref 3.5–5.2)
RBC # BLD AUTO: 3.02 10*6/MM3 (ref 4.14–5.8)
SODIUM SERPL-SCNC: 137 MMOL/L (ref 136–145)
UNIT  ABO: NORMAL
UNIT  ABO: NORMAL
UNIT  RH: NORMAL
UNIT  RH: NORMAL
WBC NRBC COR # BLD: 8.47 10*3/MM3 (ref 3.4–10.8)

## 2022-11-11 PROCEDURE — 80048 BASIC METABOLIC PNL TOTAL CA: CPT | Performed by: FAMILY MEDICINE

## 2022-11-11 PROCEDURE — 85025 COMPLETE CBC W/AUTO DIFF WBC: CPT | Performed by: FAMILY MEDICINE

## 2022-11-11 RX ORDER — SUCRALFATE 1 G/1
2 TABLET ORAL
Qty: 120 TABLET | Refills: 1 | Status: SHIPPED | OUTPATIENT
Start: 2022-11-11

## 2022-11-11 RX ORDER — METRONIDAZOLE 250 MG/1
250 TABLET ORAL 3 TIMES DAILY
Qty: 21 TABLET | Refills: 0 | Status: ON HOLD | OUTPATIENT
Start: 2022-11-11 | End: 2023-09-15

## 2022-11-11 RX ORDER — LEVOFLOXACIN 500 MG/1
500 TABLET, FILM COATED ORAL DAILY
Qty: 7 TABLET | Refills: 0 | Status: ON HOLD | OUTPATIENT
Start: 2022-11-11 | End: 2023-09-15

## 2022-11-11 RX ADMIN — SUCRALFATE 2 G: 1 TABLET ORAL at 07:46

## 2022-11-11 RX ADMIN — PANTOPRAZOLE SODIUM 40 MG: 40 TABLET, DELAYED RELEASE ORAL at 07:46

## 2022-11-11 RX ADMIN — METRONIDAZOLE 500 MG: 500 INJECTION, SOLUTION INTRAVENOUS at 12:11

## 2022-11-11 RX ADMIN — CYCLOBENZAPRINE 5 MG: 5 TABLET, FILM COATED ORAL at 00:55

## 2022-11-11 RX ADMIN — Medication 1 CAPSULE: at 12:11

## 2022-11-11 RX ADMIN — METRONIDAZOLE 500 MG: 500 INJECTION, SOLUTION INTRAVENOUS at 04:53

## 2022-11-11 RX ADMIN — SODIUM CHLORIDE 75 ML/HR: 9 INJECTION, SOLUTION INTRAVENOUS at 10:32

## 2022-11-11 RX ADMIN — HYDROCODONE BITARTRATE AND ACETAMINOPHEN 1 TABLET: 5; 325 TABLET ORAL at 04:54

## 2022-11-11 NOTE — PLAN OF CARE
Goal Outcome Evaluation:  Plan of Care Reviewed With: patient        Progress: no change  Outcome Evaluation: Dulcolax suppository given. Pt has had 2 dark brown bm's. Stool sent to lab, OB POSITIVE. Pt c/o abd cramps, medicated prn. IVF, IV abx, SCD's, Tele. Up ad beto to bathroom. Voiding without difficulty.

## 2022-11-11 NOTE — DISCHARGE SUMMARY
[facility]  DISCHARGE SUMMARY       Date of Admission: 11/9/2022  Date of Discharge:  11/11/2022  Primary Care Physician: Peter Keene Jr., MD    Discharge Diagnoses:  Active Hospital Problems    Diagnosis    • **Acute diverticulitis    • GIB (gastrointestinal bleeding)    • Microcytic anemia    • Lytic bone lesions on xray    • Liver lesion    • PIETER (acute kidney injury) (HCC)    • Back pain          Presenting Problem/History of Present Illness:  Acute diverticulitis [K57.92]     Chief Complaint on Day of Discharge:   No complaint    History of Present Illness on Day of Discharge:   Patient's abdomen is no longer uncomfortable.  White blood cell count remains within normal limits.  He continues to tolerate IV antibiotics.  Hemoglobin is up to 7.7 after second unit of PRBCs.  Renal function is at baseline with creatinine 0.94.  Stool is heme positive however there is no evidence of rapid GI blood loss.  We do not currently have GI coverage for inpatient treatment.  As such, the patient will be discharged to follow-up with his primary care physician next week to recheck blood count and to arrange for possible outpatient evaluation for heme positive stool.  Heme positive stool may be secondary to acute diverticulitis.  The patient will also require evaluation likely with PET/CT to determine the significance of the 8 mm vague hepatic lesion and the new sclerotic bone lesion in the left ilium.  The patient was made aware and will discuss this with his PCP at follow-up next week.    Hospital Course  Andi Keene is a 67-year-old male with a past medical history of anemia, diverticulosis, GERD, hypertension and chronic back pain.  He presented to urgent care today for evaluation of abdominal pain and low back pain.  He states this has been going on for 2 to 3 weeks.  He has had episodes of constipation but does not feel this is a problem.  He states he has had  "intermittent blood in the stool.  He is having difficulty with starting and stopping urine stream.  With need for further evaluation he was advised to seek evaluation at [facility] emergency department.  Reveals PIETER with a creatinine 1.71, microcytic anemia with hemoglobin 6.4.  CT of the abdomen pelvis did reveal acute diverticulitis, left ilium sclerotic lesion and multiple lesions on the liver.  Currently patient reporting back pain and low abdominal cramping.  He states he has been short of breath and weak for \"a long time\".  He has no other complaints.  He is admitted for further evaluation treatment.    Plan:   1.  Admit as inpatient  2.  Transfer 1 unit packed red blood cells  3.  Gluconate 250 mg IV x1  4.  Pharmacy to dose Levaquin, continue Flagyl  5.  Home medications reviewed and restarted as appropriate  6.  Pain and nausea management  7.  DVT prophylaxis with SCD  8.  Normal saline 75 mill/hour  9.  Supplemental oxygen as needed, continuous pulse oximetry, remote telemetry  10.  Labs in a.m., CEA, LDH, PSA    The day after mission the patient was feeling much better.  CT scan of the abdomen pelvis were reviewed showing diverticulitis of the mid to distal descending colon.  There is also fatty infiltration of liver with a vague 8 mm \"lesion\" in the left hepatic lobe as well as new sclerotic bone lesion in the left ilium above the acetabulum.  Neither of those lesions were definitive for metastatic process however outpatient work-up likely to include PET/CT should be considered for further delineation of the abnormalities.  Ultimately, the patient was transfused 2 units of PRBCs with hemoglobin reaching 7.7 prior to discharge.  IV antibiotics were transitioned to oral.  Protonix was continued and sucralfate was initiated at 2 g p.o. twice daily.    Result Review    Result Review:  I have personally reviewed the results from the time of this admission to 11/11/2022 13:22 CST and agree with " "these findings:  []  Laboratory  []  Microbiology  []  Radiology  []  EKG/Telemetry   []  Cardiology/Vascular   []  Pathology  []  Old records  []  Other:      Condition on Discharge:    Stable and improved    Physical Exam on Discharge:  BP 97/57 (BP Location: Right arm, Patient Position: Lying)   Pulse 78   Temp 99.2 °F (37.3 °C) (Oral)   Resp 18   Ht 170.2 cm (67\")   Wt 75.4 kg (166 lb 3.2 oz)   SpO2 100%   BMI 26.03 kg/m²   Physical Exam     Constitutional:       Appearance: He is normal appearing.   HENT:      Head: Normocephalic and atraumatic.      Mouth: Mucous membranes are moist.      Pharynx: Oropharynx is clear.   Eyes:      Extraocular Movements: Extraocular movements intact.      Conjunctiva/sclera: Conjunctivae normal.   Cardiovascular:      Rate and Rhythm: Normal rate and regular rhythm.   Pulmonary:      Effort: Pulmonary effort is normal.      Breath sounds: Normal breath sounds.   Abdominal:      Palpations: Abdomen is soft.      Tenderness: There is no abdominal tenderness.   Musculoskeletal:         General: Normal range of motion.      Right lower leg: No edema.      Left lower leg: No edema.   Skin:     General: Skin is warm and dry.      Coloration: Skin is normal appearing.  Neurological:      General: No focal deficit present.      Mental Status: He is alert and oriented to person, place, and time.   Psychiatric:         Mood and Affect: Mood normal.         Behavior: Behavior normal.     Discharge Disposition:  Home or Self Care    Discharge Medications:     Discharge Medications      New Medications      Instructions Start Date   iron polysacch complex-B12--0.025-1 MG capsule capsule  Commonly known as: FERREX 150 FORTE   1 capsule, Oral, Daily   Start Date: November 12, 2022     levoFLOXacin 500 MG tablet  Commonly known as: Levaquin   500 mg, Oral, Daily      metroNIDAZOLE 250 MG tablet  Commonly known as: Flagyl   250 mg, Oral, 3 Times Daily      pantoprazole 40 MG EC " tablet  Commonly known as: PROTONIX   40 mg, Oral, Every Morning      sucralfate 1 g tablet  Commonly known as: CARAFATE   2 g, Oral, 2 Times Daily Before Meals         Continue These Medications      Instructions Start Date   cyclobenzaprine 5 MG tablet  Commonly known as: FLEXERIL   5 mg, Oral, 2 Times Daily PRN      diclofenac 75 MG EC tablet  Commonly known as: VOLTAREN   75 mg, 2 Times Daily      ferrous sulfate 324 (65 Fe) MG tablet delayed-release EC tablet   324 mg, Oral, Daily With Breakfast      FIBER THERAPY PO   1 capsule, Oral, 6 Times Daily      hydroCHLOROthiazide 12.5 MG tablet  Commonly known as: HYDRODIURIL   12.5 mg, Oral, Daily      multivitamin with minerals tablet tablet   1 tablet, Oral, Daily      omeprazole 10 MG capsule  Commonly known as: prilOSEC   1 tablet, Oral, Daily             Discharge Diet:   Diet Instructions     Diet: Regular; Thin      Discharge Diet: Regular    Fluid Consistency: Thin          Discharge Care Plan / Instructions:   Discharge home    Activity at Discharge:   Activity Instructions     Activity as Tolerated            Follow-up Appointments:  Follow-up with primary care physician for further evaluation of anemia, heme positive stool and vague, ill-defined lesions in the ilium and liver       Electronically signed by Alhaji Ramirez DO, 11/11/22, 13:22 CST.    Time: Discharge Less than 30 min    Part of this note may be an electronic transcription/translation of spoken language to printed text using the Dragon Dictation system.

## 2022-11-11 NOTE — PLAN OF CARE
Goal Outcome Evaluation:  Plan of Care Reviewed With: patient        Progress: no change  Outcome Evaluation: HGB = 7.0  BLOOD X 1 UNIT GIVEN PER ORDER. IRON GIVEN PER ORDER. ROOM AIR. NO REQUEST FOR PRN PAIN MED. NO DISTRESS NOTED.

## 2022-11-12 ENCOUNTER — READMISSION MANAGEMENT (OUTPATIENT)
Dept: CALL CENTER | Facility: HOSPITAL | Age: 67
End: 2022-11-12

## 2022-11-12 NOTE — OUTREACH NOTE
Prep Survey    Flowsheet Row Responses   [facility] facility patient discharged from? [facility]   Is LACE score < 7 ? No   Emergency Room discharge w/ pulse ox? No   Eligibility Readm Mgmt   Discharge diagnosis Acute diverticulitis   Does the patient have one of the following disease processes/diagnoses(primary or secondary)? Other   Does the patient have Home health ordered? No   Is there a DME ordered? No   Prep survey completed? Yes          LUNA BROWN - Registered Nurse

## 2022-11-12 NOTE — PAYOR COMM NOTE
"[facility] 11-11-22  [id number]    Andi KEENE (67 y.o. Male)     Date of Birth   1955    Social Security Number       Address   [address] [place] [address]    Home Phone   [phone]    MRN   [id number]       Caodaism   Latter day    Marital Status                               Admission Date   11/9/22    Admission Type   Emergency    Admitting Provider   Alhaji Ramirez DO    Attending Provider       Department, Room/Bed   [facility] 3C, 372/1       Discharge Date   11/11/2022    Discharge Disposition   Home or Self Care    Discharge Destination                               Attending Provider: (none)   Allergies: No Known Allergies    Isolation: None   Infection: None   Code Status: Prior    Ht: 170.2 cm (67\")   Wt: 75.4 kg (166 lb 3.2 oz)    Admission Cmt: None   Principal Problem: Acute diverticulitis [K57.92]                 Active Insurance as of 11/9/2022     Primary Coverage     Payor Plan Insurance Group Employer/Plan Group    [facility]      Payor Plan Address Payor Plan Phone Number Payor Plan Fax Number Effective Dates    [address] [address] [phone]  8/1/2022 - None Entered    [facility] [id number]       Subscriber Name Subscriber Birth Date Member ID       KEENEANDI BRIONES 1955 [id number]                 Emergency Contacts      (Rel.) Home Phone Work Phone Mobile Phone    Dorothy Pappas (Sister) [phone] -- --               Discharge Summary      Alhaji Ramirez DO at 11/11/22 1321              [facility] Medicine Services  DISCHARGE SUMMARY       Date of Admission: 11/9/2022  Date of Discharge:  11/11/2022  Primary Care Physician: Peter Keene Jr., MD    Discharge Diagnoses:  Active Hospital Problems    Diagnosis    • **Acute diverticulitis    • GIB (gastrointestinal bleeding)    • Microcytic anemia    • Lytic bone lesions on xray    • Liver " lesion    • PIETER (acute kidney injury) (HCC)    • Back pain          Presenting Problem/History of Present Illness:  Acute diverticulitis [K57.92]     Chief Complaint on Day of Discharge:   No complaint    History of Present Illness on Day of Discharge:   Patient's abdomen is no longer uncomfortable.  White blood cell count remains within normal limits.  He continues to tolerate IV antibiotics.  Hemoglobin is up to 7.7 after second unit of PRBCs.  Renal function is at baseline with creatinine 0.94.  Stool is heme positive however there is no evidence of rapid GI blood loss.  We do not currently have GI coverage for inpatient treatment.  As such, the patient will be discharged to follow-up with his primary care physician next week to recheck blood count and to arrange for possible outpatient evaluation for heme positive stool.  Heme positive stool may be secondary to acute diverticulitis.  The patient will also require evaluation likely with PET/CT to determine the significance of the 8 mm vague hepatic lesion and the new sclerotic bone lesion in the left ilium.  The patient was made aware and will discuss this with his PCP at follow-up next week.    Hospital Course  Andi Keene is a 67-year-old male with a past medical history of anemia, diverticulosis, GERD, hypertension and chronic back pain.  He presented to urgent care today for evaluation of abdominal pain and low back pain.  He states this has been going on for 2 to 3 weeks.  He has had episodes of constipation but does not feel this is a problem.  He states he has had intermittent blood in the stool.  He is having difficulty with starting and stopping urine stream.  With need for further evaluation he was advised to seek evaluation at [facility] emergency department.  Reveals PIETER with a creatinine 1.71, microcytic anemia with hemoglobin 6.4.  CT of the abdomen pelvis did reveal acute diverticulitis, left ilium sclerotic lesion and multiple lesions  "on the liver.  Currently patient reporting back pain and low abdominal cramping.  He states he has been short of breath and weak for \"a long time\".  He has no other complaints.  He is admitted for further evaluation treatment.    Plan:   1.  Admit as inpatient  2.  Transfer 1 unit packed red blood cells  3.  Gluconate 250 mg IV x1  4.  Pharmacy to dose Levaquin, continue Flagyl  5.  Home medications reviewed and restarted as appropriate  6.  Pain and nausea management  7.  DVT prophylaxis with SCD  8.  Normal saline 75 mill/hour  9.  Supplemental oxygen as needed, continuous pulse oximetry, remote telemetry  10.  Labs in a.m., CEA, LDH, PSA    The day after mission the patient was feeling much better.  CT scan of the abdomen pelvis were reviewed showing diverticulitis of the mid to distal descending colon.  There is also fatty infiltration of liver with a vague 8 mm \"lesion\" in the left hepatic lobe as well as new sclerotic bone lesion in the left ilium above the acetabulum.  Neither of those lesions were definitive for metastatic process however outpatient work-up likely to include PET/CT should be considered for further delineation of the abnormalities.  Ultimately, the patient was transfused 2 units of PRBCs with hemoglobin reaching 7.7 prior to discharge.  IV antibiotics were transitioned to oral.  Protonix was continued and sucralfate was initiated at 2 g p.o. twice daily.    Result Review    Result Review:  I have personally reviewed the results from the time of this admission to 11/11/2022 13:22 CST and agree with these findings:  []  Laboratory  []  Microbiology  []  Radiology  []  EKG/Telemetry   []  Cardiology/Vascular   []  Pathology  []  Old records  []  Other:      Condition on Discharge:    Stable and improved    Physical Exam on Discharge:  BP 97/57 (BP Location: Right arm, Patient Position: Lying)   Pulse 78   Temp 99.2 °F (37.3 °C) (Oral)   Resp 18   Ht 170.2 cm (67\")   Wt 75.4 kg (166 lb 3.2 " oz)   SpO2 100%   BMI 26.03 kg/m²   Physical Exam     Constitutional:       Appearance: He is normal appearing.   HENT:      Head: Normocephalic and atraumatic.      Mouth: Mucous membranes are moist.      Pharynx: Oropharynx is clear.   Eyes:      Extraocular Movements: Extraocular movements intact.      Conjunctiva/sclera: Conjunctivae normal.   Cardiovascular:      Rate and Rhythm: Normal rate and regular rhythm.   Pulmonary:      Effort: Pulmonary effort is normal.      Breath sounds: Normal breath sounds.   Abdominal:      Palpations: Abdomen is soft.      Tenderness: There is no abdominal tenderness.   Musculoskeletal:         General: Normal range of motion.      Right lower leg: No edema.      Left lower leg: No edema.   Skin:     General: Skin is warm and dry.      Coloration: Skin is normal appearing.  Neurological:      General: No focal deficit present.      Mental Status: He is alert and oriented to person, place, and time.   Psychiatric:         Mood and Affect: Mood normal.         Behavior: Behavior normal.     Discharge Disposition:  Home or Self Care    Discharge Medications:     Discharge Medications      New Medications      Instructions Start Date   iron polysacch complex-B12--0.025-1 MG capsule capsule  Commonly known as: FERREX 150 FORTE   1 capsule, Oral, Daily   Start Date: November 12, 2022     levoFLOXacin 500 MG tablet  Commonly known as: Levaquin   500 mg, Oral, Daily      metroNIDAZOLE 250 MG tablet  Commonly known as: Flagyl   250 mg, Oral, 3 Times Daily      pantoprazole 40 MG EC tablet  Commonly known as: PROTONIX   40 mg, Oral, Every Morning      sucralfate 1 g tablet  Commonly known as: CARAFATE   2 g, Oral, 2 Times Daily Before Meals         Continue These Medications      Instructions Start Date   cyclobenzaprine 5 MG tablet  Commonly known as: FLEXERIL   5 mg, Oral, 2 Times Daily PRN      diclofenac 75 MG EC tablet  Commonly known as: VOLTAREN   75 mg, 2 Times Daily       ferrous sulfate 324 (65 Fe) MG tablet delayed-release EC tablet   324 mg, Oral, Daily With Breakfast      FIBER THERAPY PO   1 capsule, Oral, 6 Times Daily      hydroCHLOROthiazide 12.5 MG tablet  Commonly known as: HYDRODIURIL   12.5 mg, Oral, Daily      multivitamin with minerals tablet tablet   1 tablet, Oral, Daily      omeprazole 10 MG capsule  Commonly known as: prilOSEC   1 tablet, Oral, Daily             Discharge Diet:   Diet Instructions     Diet: Regular; Thin      Discharge Diet: Regular    Fluid Consistency: Thin          Discharge Care Plan / Instructions:   Discharge home    Activity at Discharge:   Activity Instructions     Activity as Tolerated            Follow-up Appointments:  Follow-up with primary care physician for further evaluation of anemia, heme positive stool and vague, ill-defined lesions in the ilium and liver       Electronically signed by Alhaji Ramirez DO, 11/11/22, 13:22 CST.    Time: Discharge Less than 30 min    Part of this note may be an electronic transcription/translation of spoken language to printed text using the Dragon Dictation system.        Electronically signed by Alhaji Ramirez DO at 11/11/22 3559

## 2022-11-13 NOTE — PAYOR COMM NOTE
"[place] [facility] 11-11-22  [id number]    Andi KEENE (67 y.o. Male)     Date of Birth   1955    Social Security Number       Address   [address] [place] [address]    Home Phone   [phone]    MRN   [id number]       Shinto   Episcopal    Marital Status                               Admission Date   11/9/22    Admission Type   Emergency    Admitting Provider   Alhaji Ramirez DO    Attending Provider       Department, Room/Bed   [facility] 3C, 372/1       Discharge Date   11/11/2022    Discharge Disposition   Home or Self Care    Discharge Destination                               Attending Provider: (none)   Allergies: No Known Allergies    Isolation: None   Infection: None   Code Status: Prior    Ht: 170.2 cm (67\")   Wt: 75.4 kg (166 lb 3.2 oz)    Admission Cmt: None   Principal Problem: Acute diverticulitis [K57.92]                 Active Insurance as of 11/9/2022     Primary Coverage     Payor Plan Insurance Group Employer/Plan Group    [facility]      Payor Plan Address Payor Plan Phone Number Payor Plan Fax Number Effective Dates    [address] [address] [phone]  8/1/2022 - None Entered    [facility] [id number]       Subscriber Name Subscriber Birth Date Member ID       KEENEANDI BRIONES 1955 [id number]                 Emergency Contacts      (Rel.) Home Phone Work Phone Mobile Phone    Dorothy Pappas (Sister) [phone] -- --               Discharge Summary      Alhaji Ramirez DO at 11/11/22 1321              [facility] Medicine Services  DISCHARGE SUMMARY       Date of Admission: 11/9/2022  Date of Discharge:  11/11/2022  Primary Care Physician: Peter Keene Jr., MD    Discharge Diagnoses:  Active Hospital Problems    Diagnosis    • **Acute diverticulitis    • GIB (gastrointestinal bleeding)    • Microcytic anemia    • Lytic bone lesions on xray    • Liver " lesion    • PIETER (acute kidney injury) (HCC)    • Back pain          Presenting Problem/History of Present Illness:  Acute diverticulitis [K57.92]     Chief Complaint on Day of Discharge:   No complaint    History of Present Illness on Day of Discharge:   Patient's abdomen is no longer uncomfortable.  White blood cell count remains within normal limits.  He continues to tolerate IV antibiotics.  Hemoglobin is up to 7.7 after second unit of PRBCs.  Renal function is at baseline with creatinine 0.94.  Stool is heme positive however there is no evidence of rapid GI blood loss.  We do not currently have GI coverage for inpatient treatment.  As such, the patient will be discharged to follow-up with his primary care physician next week to recheck blood count and to arrange for possible outpatient evaluation for heme positive stool.  Heme positive stool may be secondary to acute diverticulitis.  The patient will also require evaluation likely with PET/CT to determine the significance of the 8 mm vague hepatic lesion and the new sclerotic bone lesion in the left ilium.  The patient was made aware and will discuss this with his PCP at follow-up next week.    Hospital Course  Andi Keene is a 67-year-old male with a past medical history of anemia, diverticulosis, GERD, hypertension and chronic back pain.  He presented to urgent care today for evaluation of abdominal pain and low back pain.  He states this has been going on for 2 to 3 weeks.  He has had episodes of constipation but does not feel this is a problem.  He states he has had intermittent blood in the stool.  He is having difficulty with starting and stopping urine stream.  With need for further evaluation he was advised to seek evaluation at [facility] emergency department.  Reveals PIETER with a creatinine 1.71, microcytic anemia with hemoglobin 6.4.  CT of the abdomen pelvis did reveal acute diverticulitis, left ilium sclerotic lesion and multiple lesions  "on the liver.  Currently patient reporting back pain and low abdominal cramping.  He states he has been short of breath and weak for \"a long time\".  He has no other complaints.  He is admitted for further evaluation treatment.    Plan:   1.  Admit as inpatient  2.  Transfer 1 unit packed red blood cells  3.  Gluconate 250 mg IV x1  4.  Pharmacy to dose Levaquin, continue Flagyl  5.  Home medications reviewed and restarted as appropriate  6.  Pain and nausea management  7.  DVT prophylaxis with SCD  8.  Normal saline 75 mill/hour  9.  Supplemental oxygen as needed, continuous pulse oximetry, remote telemetry  10.  Labs in a.m., CEA, LDH, PSA    The day after mission the patient was feeling much better.  CT scan of the abdomen pelvis were reviewed showing diverticulitis of the mid to distal descending colon.  There is also fatty infiltration of liver with a vague 8 mm \"lesion\" in the left hepatic lobe as well as new sclerotic bone lesion in the left ilium above the acetabulum.  Neither of those lesions were definitive for metastatic process however outpatient work-up likely to include PET/CT should be considered for further delineation of the abnormalities.  Ultimately, the patient was transfused 2 units of PRBCs with hemoglobin reaching 7.7 prior to discharge.  IV antibiotics were transitioned to oral.  Protonix was continued and sucralfate was initiated at 2 g p.o. twice daily.    Result Review    Result Review:  I have personally reviewed the results from the time of this admission to 11/11/2022 13:22 CST and agree with these findings:  []  Laboratory  []  Microbiology  []  Radiology  []  EKG/Telemetry   []  Cardiology/Vascular   []  Pathology  []  Old records  []  Other:      Condition on Discharge:    Stable and improved    Physical Exam on Discharge:  BP 97/57 (BP Location: Right arm, Patient Position: Lying)   Pulse 78   Temp 99.2 °F (37.3 °C) (Oral)   Resp 18   Ht 170.2 cm (67\")   Wt 75.4 kg (166 lb 3.2 " oz)   SpO2 100%   BMI 26.03 kg/m²   Physical Exam     Constitutional:       Appearance: He is normal appearing.   HENT:      Head: Normocephalic and atraumatic.      Mouth: Mucous membranes are moist.      Pharynx: Oropharynx is clear.   Eyes:      Extraocular Movements: Extraocular movements intact.      Conjunctiva/sclera: Conjunctivae normal.   Cardiovascular:      Rate and Rhythm: Normal rate and regular rhythm.   Pulmonary:      Effort: Pulmonary effort is normal.      Breath sounds: Normal breath sounds.   Abdominal:      Palpations: Abdomen is soft.      Tenderness: There is no abdominal tenderness.   Musculoskeletal:         General: Normal range of motion.      Right lower leg: No edema.      Left lower leg: No edema.   Skin:     General: Skin is warm and dry.      Coloration: Skin is normal appearing.  Neurological:      General: No focal deficit present.      Mental Status: He is alert and oriented to person, place, and time.   Psychiatric:         Mood and Affect: Mood normal.         Behavior: Behavior normal.     Discharge Disposition:  Home or Self Care    Discharge Medications:     Discharge Medications      New Medications      Instructions Start Date   iron polysacch complex-B12--0.025-1 MG capsule capsule  Commonly known as: FERREX 150 FORTE   1 capsule, Oral, Daily   Start Date: November 12, 2022     levoFLOXacin 500 MG tablet  Commonly known as: Levaquin   500 mg, Oral, Daily      metroNIDAZOLE 250 MG tablet  Commonly known as: Flagyl   250 mg, Oral, 3 Times Daily      pantoprazole 40 MG EC tablet  Commonly known as: PROTONIX   40 mg, Oral, Every Morning      sucralfate 1 g tablet  Commonly known as: CARAFATE   2 g, Oral, 2 Times Daily Before Meals         Continue These Medications      Instructions Start Date   cyclobenzaprine 5 MG tablet  Commonly known as: FLEXERIL   5 mg, Oral, 2 Times Daily PRN      diclofenac 75 MG EC tablet  Commonly known as: VOLTAREN   75 mg, 2 Times Daily       ferrous sulfate 324 (65 Fe) MG tablet delayed-release EC tablet   324 mg, Oral, Daily With Breakfast      FIBER THERAPY PO   1 capsule, Oral, 6 Times Daily      hydroCHLOROthiazide 12.5 MG tablet  Commonly known as: HYDRODIURIL   12.5 mg, Oral, Daily      multivitamin with minerals tablet tablet   1 tablet, Oral, Daily      omeprazole 10 MG capsule  Commonly known as: prilOSEC   1 tablet, Oral, Daily             Discharge Diet:   Diet Instructions     Diet: Regular; Thin      Discharge Diet: Regular    Fluid Consistency: Thin          Discharge Care Plan / Instructions:   Discharge home    Activity at Discharge:   Activity Instructions     Activity as Tolerated            Follow-up Appointments:  Follow-up with primary care physician for further evaluation of anemia, heme positive stool and vague, ill-defined lesions in the ilium and liver       Electronically signed by Alhaji Ramirez DO, 11/11/22, 13:22 CST.    Time: Discharge Less than 30 min    Part of this note may be an electronic transcription/translation of spoken language to printed text using the Dragon Dictation system.        Electronically signed by Alhaji Ramirez DO at 11/11/22 1902

## 2022-11-14 LAB
BACTERIA SPEC AEROBE CULT: NORMAL
BACTERIA SPEC AEROBE CULT: NORMAL

## 2022-11-16 ENCOUNTER — READMISSION MANAGEMENT (OUTPATIENT)
Dept: CALL CENTER | Facility: HOSPITAL | Age: 67
End: 2022-11-16

## 2022-11-16 NOTE — OUTREACH NOTE
Medical Week 1 Survey    Flowsheet Row Responses   [facility] patient discharged from? [place]   Does the patient have one of the following disease processes/diagnoses(primary or secondary)? Other   Week 1 attempt successful? Yes   Call start time 1543   Call end time 1545   Discharge diagnosis Acute diverticulitis   Meds reviewed with patient/caregiver? Yes   Is the patient having any side effects they believe may be caused by any medication additions or changes? No   Does the patient have all medications ordered at discharge? Yes   Is the patient taking all medications as directed (includes completed medication regime)? Yes   Does the patient have a primary care provider?  Yes   Does the patient have an appointment with their PCP within 7 days of discharge? Greater than 7 days   Comments regarding PCP PATIENT STATES HIS PCP APPOINTMENT IS MONDAY 11/21/22   What is preventing the patient from scheduling follow up appointments within 7 days of discharge? Earlier appointment not available   Nursing Interventions Verified appointment date/time/provider   Has the patient kept scheduled appointments due by today? N/A   Has home health visited the patient within 72 hours of discharge? N/A   Psychosocial issues? No   Did the patient receive a copy of their discharge instructions? Yes   Nursing interventions Reviewed instructions with patient   What is the patient's perception of their health status since discharge? Improving   Is the patient/caregiver able to teach back signs and symptoms related to disease process for when to call PCP? Yes   Is the patient/caregiver able to teach back signs and symptoms related to disease process for when to call 911? Yes   Is the patient/caregiver able to teach back the hierarchy of who to call/visit for symptoms/problems? PCP, Specialist, Home health nurse, Urgent Care, ED, 911 Yes   If the patient is a current smoker, are they able to teach back resources for cessation? Not a  smoker   Week 1 call completed? Yes          SAL ALCANTAR - Licensed Nurse

## 2023-01-23 ENCOUNTER — TRANSCRIBE ORDERS (OUTPATIENT)
Dept: ADMINISTRATIVE | Facility: HOSPITAL | Age: 68
End: 2023-01-23
Payer: MEDICARE

## 2023-01-23 DIAGNOSIS — K57.32 DIVERTICULITIS OF LARGE INTESTINE WITHOUT PERFORATION OR ABSCESS WITHOUT BLEEDING: Primary | ICD-10-CM

## 2023-01-24 ENCOUNTER — HOSPITAL ENCOUNTER (OUTPATIENT)
Dept: CT IMAGING | Facility: HOSPITAL | Age: 68
Discharge: HOME | End: 2023-01-24
Admitting: FAMILY MEDICINE
Payer: MEDICARE

## 2023-01-24 DIAGNOSIS — K57.32 DIVERTICULITIS OF LARGE INTESTINE WITHOUT PERFORATION OR ABSCESS WITHOUT BLEEDING: ICD-10-CM

## 2023-01-24 LAB — CREAT BLDA-MCNC: 1 MG/DL (ref 0.6–1.3)

## 2023-01-24 PROCEDURE — 82565 ASSAY OF CREATININE: CPT

## 2023-01-24 PROCEDURE — 25010000002 IOPAMIDOL 61 % SOLUTION: Performed by: FAMILY MEDICINE

## 2023-01-24 PROCEDURE — 74177 CT ABD & PELVIS W/CONTRAST: CPT

## 2023-01-24 RX ADMIN — IOPAMIDOL 100 ML: 612 INJECTION, SOLUTION INTRAVENOUS at 09:49

## 2023-02-09 ENCOUNTER — TRANSCRIBE ORDERS (OUTPATIENT)
Dept: ADMINISTRATIVE | Facility: HOSPITAL | Age: 68
End: 2023-02-09
Payer: MEDICARE

## 2023-02-09 DIAGNOSIS — K57.20 DIVERTICULITIS OF LARGE INTESTINE WITH PERFORATION AND ABSCESS WITHOUT BLEEDING: Primary | ICD-10-CM

## 2023-02-13 ENCOUNTER — HOSPITAL ENCOUNTER (OUTPATIENT)
Dept: CT IMAGING | Facility: HOSPITAL | Age: 68
Discharge: HOME | End: 2023-02-13
Admitting: FAMILY MEDICINE
Payer: MEDICARE

## 2023-02-13 DIAGNOSIS — K57.20 DIVERTICULITIS OF LARGE INTESTINE WITH PERFORATION AND ABSCESS WITHOUT BLEEDING: ICD-10-CM

## 2023-02-13 LAB — CREAT BLDA-MCNC: 0.9 MG/DL (ref 0.6–1.3)

## 2023-02-13 PROCEDURE — 82565 ASSAY OF CREATININE: CPT

## 2023-02-13 PROCEDURE — 74177 CT ABD & PELVIS W/CONTRAST: CPT

## 2023-02-13 PROCEDURE — 25010000002 IOPAMIDOL 61 % SOLUTION: Performed by: FAMILY MEDICINE

## 2023-02-13 RX ADMIN — IOPAMIDOL 100 ML: 612 INJECTION, SOLUTION INTRAVENOUS at 09:23

## 2023-04-06 ENCOUNTER — TRANSCRIBE ORDERS (OUTPATIENT)
Dept: ADMINISTRATIVE | Facility: HOSPITAL | Age: 68
End: 2023-04-06
Payer: MEDICARE

## 2023-04-06 DIAGNOSIS — K57.32 DIVERTICULITIS OF LARGE INTESTINE WITHOUT PERFORATION OR ABSCESS WITHOUT BLEEDING: Primary | ICD-10-CM

## 2023-04-14 ENCOUNTER — HOSPITAL ENCOUNTER (OUTPATIENT)
Dept: CT IMAGING | Facility: HOSPITAL | Age: 68
Discharge: HOME | End: 2023-04-14
Admitting: FAMILY MEDICINE
Payer: MEDICARE

## 2023-04-14 DIAGNOSIS — K57.32 DIVERTICULITIS OF LARGE INTESTINE WITHOUT PERFORATION OR ABSCESS WITHOUT BLEEDING: ICD-10-CM

## 2023-04-14 LAB — CREAT BLDA-MCNC: 1 MG/DL (ref 0.6–1.3)

## 2023-04-14 PROCEDURE — 74177 CT ABD & PELVIS W/CONTRAST: CPT

## 2023-04-14 PROCEDURE — 82565 ASSAY OF CREATININE: CPT

## 2023-04-14 PROCEDURE — 25510000001 IOPAMIDOL 61 % SOLUTION: Performed by: FAMILY MEDICINE

## 2023-04-14 RX ADMIN — IOPAMIDOL 100 ML: 612 INJECTION, SOLUTION INTRAVENOUS at 10:47

## 2023-08-15 NOTE — PROGRESS NOTES
Chief Complaint   Patient presents with    Colonoscopy     Colon 8-15-22 colon diverticulosis 1 year recall       PCP: Peter Keene Jr., MD  REFER: No ref. provider found    Subjective     HPI    Andi KEENE is a 67 y.o. male who presents to office for preventative maintenance.  There is not  a personal history of colon polyps.  There is not a history of colon cancer.  He does not have complaints of nausea/vomiting,  weight loss, no BRBPR, no melena.  There is not a family history of colon cancer in first degree relative.  There is not a family history of colon polyps in first degree relative.  Andi KEENE last colonoscopy-2022 . He will utilize suppository 2-3 times per week to help facilitate bowel movement.      Diagnosis of Celestin's Esophagus 2011 after EGD by Dr Sams.   No dysphagia.  He is taking OTC Omeprazole in the mornings and is required to take second dose of Omeprazole at night due to heartburn.      CT Abdomen Pelvis With Contrast (04/14/2023 10:45)     COLONOSCOPY (08/15/2022 09:57) -diverticulosis (1 years), 10% obscured  COLONOSCOPY (11/15/2016 10:53) -diverticulosis   SCANNED - COLONOSCOPY (02/26/2013 00:00)     UPPER GI ENDOSCOPY (11/14/2016 07:44)     Lab Results - Last 18 Months   Lab Units 04/14/23  0904 02/13/23  0911 01/24/23  0943 11/11/22  0800 11/10/22  0756 11/09/22  1822   GLUCOSE mg/dL  --   --   --  91 92 113*   SODIUM mmol/L  --   --   --  137 137 135*   POTASSIUM mmol/L  --   --   --  4.0 3.6 3.9   CREATININE mg/dL 1.00 0.90 1.00 0.94 1.21 1.71*   BUN mg/dL  --   --   --  8 14 21   BUN / CREAT RATIO   --   --   --  8.5 11.6 12.3   ALK PHOS U/L  --   --   --   --  65 74   ALT (SGPT) U/L  --   --   --   --  7 8   AST (SGOT) U/L  --   --   --   --  11 13   BILIRUBIN mg/dL  --   --   --   --  0.4 0.2   ALBUMIN g/dL  --   --   --   --  3.30* 4.00       No results for input(s): EGFRIFNONA, EGFRIFAFRI, EGFRRESULT in the last 50374 hours.     Past Medical History:   Diagnosis Date     Anemia     Diverticulosis     GERD (gastroesophageal reflux disease)     Hypertension     Lumbar back pain      Past Surgical History:   Procedure Laterality Date    COLONOSCOPY N/A 11/15/2016    Procedure: COLONOSCOPY WITH ANESTHESIA;  Surgeon: Orestes Sams DO;  Location:  [facility];  Service:     COLONOSCOPY N/A 8/15/2022    Procedure: COLONOSCOPY WITH ANESTHESIA;  Surgeon: Orestes Sams DO;  Location:  [facility];  Service: Gastroenterology;  Laterality: N/A;  preop; abdominal pain  postop; diverticulosis; suboptimal prep;   PCP Peter Keene     ENDOSCOPY N/A 11/14/2016    Procedure: ESOPHAGOGASTRODUODENOSCOPY WITH ANESTHESIA;  Surgeon: Orestes Sams DO;  Location:  [facility];  Service:     HERNIA REPAIR      REPLACEMENT TOTAL KNEE      TONSILLECTOMY      UMBILICAL HERNIA REPAIR N/A 5/4/2018    Procedure: OPEN UMBILICAL HERNIA REPAIR WITH MESH;  Surgeon: Augusta Sherman MD;  Location: [facility] OR;  Service: General     Outpatient Medications Marked as Taking for the 8/16/23 encounter (Office Visit) with Reymundo Espinoza APRN   Medication Sig Dispense Refill    cyclobenzaprine (FLEXERIL) 5 MG tablet Take 1 tablet by mouth 2 (Two) Times a Day As Needed for Muscle Spasms.      diclofenac (VOLTAREN) 75 MG EC tablet 1 tablet 2 (Two) Times a Day.      ferrous sulfate 324 (65 FE) MG tablet delayed-release EC tablet Take 1 tablet by mouth Daily With Breakfast.      hydroCHLOROthiazide (HYDRODIURIL) 12.5 MG tablet Take 1 tablet by mouth Daily.      levoFLOXacin (Levaquin) 500 MG tablet Take 1 tablet by mouth Daily. 7 tablet 0    Methylcellulose, Laxative, (FIBER THERAPY PO) Take 1 capsule by mouth 6 (Six) Times a Day.      multivitamin with minerals tablet tablet Take 1 tablet by mouth Daily.      omeprazole (prilOSEC) 10 MG capsule Take 1 tablet by mouth Daily.       No Known Allergies  Social History     Socioeconomic History    Marital status:    Tobacco Use    Smoking status:  Former     Types: Cigars    Smokeless tobacco: Never   Vaping Use    Vaping Use: Never used   Substance and Sexual Activity    Alcohol use: Yes     Alcohol/week: 1.0 standard drink     Types: 1 Cans of beer per week     Comment: occ.    Drug use: No    Sexual activity: Defer     Review of Systems   Constitutional:  Negative for fever and unexpected weight change.   HENT:  Negative for trouble swallowing.    Respiratory:  Negative for shortness of breath.    Cardiovascular:  Negative for chest pain.   Gastrointestinal:  Negative for abdominal pain and anal bleeding.   Objective   Vitals:    08/16/23 1418   BP: 100/60   Pulse: 88   Temp: 97.7 øF (36.5 øC)   SpO2: 96%     Physical Exam  Constitutional:       Appearance: Normal appearance. He is well-developed.   Eyes:      General: No scleral icterus.  Cardiovascular:      Heart sounds: Normal heart sounds. No murmur heard.  Pulmonary:      Effort: Pulmonary effort is normal.   Abdominal:      General: Bowel sounds are normal. There is no distension.      Palpations: Abdomen is soft.      Tenderness: There is no abdominal tenderness. There is no guarding.   Skin:     General: Skin is warm and dry.      Coloration: Skin is not jaundiced.   Neurological:      Mental Status: He is alert.   Psychiatric:         Behavior: Behavior is cooperative.     Imaging Results (Most Recent)       None          Body mass index is 25.06 kg/mý.    Assessment & Plan   Diagnoses and all orders for this visit:    1. Lower abdominal pain (Primary)  -     Case Request; Standing  -     Implement Anesthesia Orders Day of Procedure; Standing  -     Obtain Informed Consent; Standing  -     Verify bowel prep was successful; Standing  -     Give tap water enema if bowel prep was insufficient; Standing  -     Case Request    2. Encounter for screening for malignant neoplasm of colon    3. Gastroesophageal reflux disease, unspecified whether esophagitis present  -     Case Request; Standing  -      Implement Anesthesia Orders Day of Procedure; Standing  -     Obtain Informed Consent; Standing  -     Case Request    4. Celestin's esophagus without dysplasia      COLONOSCOPY WITH ANESTHESIA (N/A), ESOPHAGOGASTRODUODENOSCOPY WITH ANESTHESIA (N/A)    Continue daily Omeprazole as currently taking with further recommendations pending finding on EGD, will likely need to increase PPI  Take PPI 30 min prior to breakfast   Decrease caffeine, nicotine, etoh- all contribute to acid reflux  Do not eat 2-3 hours within lying down, elevated HOB 4-6 inches    Advised pt to stop use of NSAIDs, Fish Oil, and MV 5 days prior to procedure, per Dr Sams protocol.  Tylenol based products are ok to take.  Pt verbalized understanding.     All risks, benefits, alternatives, and indications of colonoscopy procedure have been discussed with the patient. Risks to include perforation of the colon requiring possible surgery or colostomy, risk of bleeding from biopsies or removal of colon tissue, possibility of missing a colon polyp or cancer, or adverse drug reaction.  Benefits to include the diagnosis and management of disease of the colon and rectum. Alternatives to include barium enema, radiographic evaluation, lab testing or no intervention. He verbalizes understanding and agrees.     The risks of the endoscopy were discussed in detail.  We discussed the risks of perforation (one out of 4398-9165, riskier with dilation), bleeding (one out of 500), and the rare risks of infection, adverse reaction to anesthesia, respiratory failure, cardiac failure including MI and adverse reaction to medications, etc.  We discussed consequences that could occur if a risk were to develop such as the need for hospitalization, blood transfusion, surgical intervention, medications, pain and disability and death.  Alternatives include not doing anything, or pursuing an UGI series which only offers a diagnosis with potential less accuracy compared to EGD.   The patient verbalizes understanding and agrees to proceed.     Reymundo Espinoza, APRN  08/16/23        Patient Instructions   Gastroesophageal Reflux Disease, Adult    Gastroesophageal reflux disease (GERD) happens when acid from your stomach flows up into the esophagus. When acid comes in contact with the esophagus, the acid causes soreness (inflammation) in the esophagus. Over time, GERD may create small holes (ulcers) in the lining of the esophagus.  CAUSES    Increased body weight. This puts pressure on the stomach, making acid rise from the stomach into the esophagus.  Smoking. This increases acid production in the stomach.  Drinking alcohol. This causes decreased pressure in the lower esophageal sphincter (valve or ring of muscle between the esophagus and stomach), allowing acid from the stomach into the esophagus.  Late evening meals and a full stomach. This increases pressure and acid production in the stomach.  A malformed lower esophageal sphincter.  Sometimes, no cause is found.  SYMPTOMS    Burning pain in the lower part of the mid-chest behind the breastbone and in the mid-stomach area. This may occur twice a week or more often.  Trouble swallowing.  Sore throat.  Dry cough.  Asthma-like symptoms including chest tightness, shortness of breath, or wheezing.  DIAGNOSIS    Your caregiver may be able to diagnose GERD based on your symptoms. In some cases, X-rays and other tests may be done to check for complications or to check the condition of your stomach and esophagus.  TREATMENT    Your caregiver may recommend over-the-counter or prescription medicines to help decrease acid production. Ask your caregiver before starting or adding any new medicines.    HOME CARE INSTRUCTIONS    Change the factors that you can control. Ask your caregiver for guidance concerning weight loss, quitting smoking, and alcohol consumption.  Avoid foods and drinks that make your symptoms worse, such as:  Caffeine or alcoholic  drinks.  Chocolate.  Peppermint or mint flavorings.  Garlic and onions.  Spicy foods.  Citrus fruits, such as oranges, xavier, or limes.  Tomato-based foods such as sauce, chili, salsa, and pizza.  Fried and fatty foods.  Avoid lying down for the 3 hours prior to your bedtime or prior to taking a nap.  Eat small, frequent meals instead of large meals.  Wear loose-fitting clothing. Do not wear anything tight around your waist that causes pressure on your stomach.  Raise the head of your bed 6 to 8 inches with wood blocks to help you sleep. Extra pillows will not help.  Only take over-the-counter or prescription medicines for pain, discomfort, or fever as directed by your caregiver.  Do not take aspirin, ibuprofen, or other nonsteroidal anti-inflammatory drugs (NSAIDs).  SEEK IMMEDIATE MEDICAL CARE IF:    You have pain in your arms, neck, jaw, teeth, or back.  Your pain increases or changes in intensity or duration.  You develop nausea, vomiting, or sweating (diaphoresis).  You develop shortness of breath, or you faint.  Your vomit is green, yellow, black, or looks like coffee grounds or blood.  Your stool is red, bloody, or black.  These symptoms could be signs of other problems, such as heart disease, gastric bleeding, or esophageal bleeding.  MAKE SURE YOU:    Understand these instructions.  Will watch your condition.  Will get help right away if you are not doing well or get worse.     This information is not intended to replace advice given to you by your health care provider. Make sure you discuss any questions you have with your health care provider.     Document Released: 09/27/2006 Document Revised: 01/08/2016 Document Reviewed: 04/13/2016  [employer] Interactive Patient Education c2016 [employer]      Celestin's Esophagus    Celestin's esophagus occurs when the lining of the esophagus is damaged. The esophagus is the tube that carries food from the mouth to the stomach. With Celestin's esophagus, the lining of  the esophagus gets replaced by material that is similar to the lining in the intestines. This process is called intestinal metaplasia. A small number of people with Celestin's esophagus develop esophageal cancer.  CAUSES    The exact cause of Celestin's esophagus is unknown.  SYMPTOMS    Most people with Celestin's esophagus do not have symptoms. However, many patients also have gastroesophageal reflux disease (GERD). GERD can cause heartburn, trouble swallowing, and a dry cough.  DIAGNOSIS  Celestin's esophagus is diagnosed by an exam called upper gastrointestinal endoscopy. A thin, flexible tube (endoscope) is passed down the esophagus. The endoscope has a light and camera on the end. Your caregiver uses the endoscope to view the inside of the esophagus. A tissue sample may also be taken and examined under a microscope (biopsy). If cancer cells are found during the biopsy, this condition is called dysplasia.  TREATMENT    If you have no dysplasia or low-grade dysplasia, your caregiver may recommend no treatment or only taking medicines to treat GERD. Sometimes, taking acid-blocking drugs to treat GERD helps improve the tissue affected by Celestin's esophagus. Your caregiver may also recommend periodic esophageal exams.  If you have high-grade dysplasia, treatment may include removing the damaged parts of the esophagus. This can be done by heating, freezing, or surgically removing the tissue. In some cases, surgery may be done to remove most of the esophagus. The stomach is then attached to the remaining portion of the esophagus.  HOME CARE INSTRUCTIONS  Take acid-blocking drugs for GERD if recommended by your caregiver.  Keep all follow-up appointments as directed by your caregiver. You may need periodic esophageal exams.  SEEK IMMEDIATE MEDICAL CARE IF:  You have chest pain.  You have trouble swallowing.  You vomit blood or material that looks like coffee grounds.  Your stools are bright red or dark.  This information  is not intended to replace advice given to you by your health care provider. Make sure you discuss any questions you have with your health care provider.  Document Released: 03/09/2005 Document Revised: 06/18/2013 Document Reviewed: 02/26/2013  [employer] Interactive Patient Education c2016 [employer]

## 2023-08-15 NOTE — H&P (VIEW-ONLY)
Chief Complaint   Patient presents with    Colonoscopy     Colon 8-15-22 colon diverticulosis 1 year recall       PCP: Peter Keene Jr., MD  REFER: No ref. provider found    Subjective     HPI    Andi KEENE is a 67 y.o. male who presents to office for preventative maintenance.  There is not  a personal history of colon polyps.  There is not a history of colon cancer.  He does not have complaints of nausea/vomiting,  weight loss, no BRBPR, no melena.  There is not a family history of colon cancer in first degree relative.  There is not a family history of colon polyps in first degree relative.  Andi KEENE last colonoscopy-2022 . He will utilize suppository 2-3 times per week to help facilitate bowel movement.      Diagnosis of Celestin's Esophagus 2011 after EGD by Dr Sams.   No dysphagia.  He is taking OTC Omeprazole in the mornings and is required to take second dose of Omeprazole at night due to heartburn.      CT Abdomen Pelvis With Contrast (04/14/2023 10:45)     COLONOSCOPY (08/15/2022 09:57) -diverticulosis (1 years), 10% obscured  COLONOSCOPY (11/15/2016 10:53) -diverticulosis   SCANNED - COLONOSCOPY (02/26/2013 00:00)     UPPER GI ENDOSCOPY (11/14/2016 07:44)     Lab Results - Last 18 Months   Lab Units 04/14/23  0904 02/13/23  0911 01/24/23  0943 11/11/22  0800 11/10/22  0756 11/09/22  1822   GLUCOSE mg/dL  --   --   --  91 92 113*   SODIUM mmol/L  --   --   --  137 137 135*   POTASSIUM mmol/L  --   --   --  4.0 3.6 3.9   CREATININE mg/dL 1.00 0.90 1.00 0.94 1.21 1.71*   BUN mg/dL  --   --   --  8 14 21   BUN / CREAT RATIO   --   --   --  8.5 11.6 12.3   ALK PHOS U/L  --   --   --   --  65 74   ALT (SGPT) U/L  --   --   --   --  7 8   AST (SGOT) U/L  --   --   --   --  11 13   BILIRUBIN mg/dL  --   --   --   --  0.4 0.2   ALBUMIN g/dL  --   --   --   --  3.30* 4.00       No results for input(s): EGFRIFNONA, EGFRIFAFRI, EGFRRESULT in the last 34003 hours.     Past Medical History:   Diagnosis Date     Anemia     Diverticulosis     GERD (gastroesophageal reflux disease)     Hypertension     Lumbar back pain      Past Surgical History:   Procedure Laterality Date    COLONOSCOPY N/A 11/15/2016    Procedure: COLONOSCOPY WITH ANESTHESIA;  Surgeon: Orestes Sams DO;  Location:  [facility];  Service:     COLONOSCOPY N/A 8/15/2022    Procedure: COLONOSCOPY WITH ANESTHESIA;  Surgeon: Orestes Sams DO;  Location:  [facility];  Service: Gastroenterology;  Laterality: N/A;  preop; abdominal pain  postop; diverticulosis; suboptimal prep;   PCP Peter Keene     ENDOSCOPY N/A 11/14/2016    Procedure: ESOPHAGOGASTRODUODENOSCOPY WITH ANESTHESIA;  Surgeon: Orestes Sams DO;  Location:  [facility];  Service:     HERNIA REPAIR      REPLACEMENT TOTAL KNEE      TONSILLECTOMY      UMBILICAL HERNIA REPAIR N/A 5/4/2018    Procedure: OPEN UMBILICAL HERNIA REPAIR WITH MESH;  Surgeon: Augusta Sherman MD;  Location: [facility] OR;  Service: General     Outpatient Medications Marked as Taking for the 8/16/23 encounter (Office Visit) with Reymundo Espinoza APRN   Medication Sig Dispense Refill    cyclobenzaprine (FLEXERIL) 5 MG tablet Take 1 tablet by mouth 2 (Two) Times a Day As Needed for Muscle Spasms.      diclofenac (VOLTAREN) 75 MG EC tablet 1 tablet 2 (Two) Times a Day.      ferrous sulfate 324 (65 FE) MG tablet delayed-release EC tablet Take 1 tablet by mouth Daily With Breakfast.      hydroCHLOROthiazide (HYDRODIURIL) 12.5 MG tablet Take 1 tablet by mouth Daily.      levoFLOXacin (Levaquin) 500 MG tablet Take 1 tablet by mouth Daily. 7 tablet 0    Methylcellulose, Laxative, (FIBER THERAPY PO) Take 1 capsule by mouth 6 (Six) Times a Day.      multivitamin with minerals tablet tablet Take 1 tablet by mouth Daily.      omeprazole (prilOSEC) 10 MG capsule Take 1 tablet by mouth Daily.       No Known Allergies  Social History     Socioeconomic History    Marital status:    Tobacco Use    Smoking status:  Former     Types: Cigars    Smokeless tobacco: Never   Vaping Use    Vaping Use: Never used   Substance and Sexual Activity    Alcohol use: Yes     Alcohol/week: 1.0 standard drink     Types: 1 Cans of beer per week     Comment: occ.    Drug use: No    Sexual activity: Defer     Review of Systems   Constitutional:  Negative for fever and unexpected weight change.   HENT:  Negative for trouble swallowing.    Respiratory:  Negative for shortness of breath.    Cardiovascular:  Negative for chest pain.   Gastrointestinal:  Negative for abdominal pain and anal bleeding.   Objective   Vitals:    08/16/23 1418   BP: 100/60   Pulse: 88   Temp: 97.7 °F (36.5 °C)   SpO2: 96%     Physical Exam  Constitutional:       Appearance: Normal appearance. He is well-developed.   Eyes:      General: No scleral icterus.  Cardiovascular:      Heart sounds: Normal heart sounds. No murmur heard.  Pulmonary:      Effort: Pulmonary effort is normal.   Abdominal:      General: Bowel sounds are normal. There is no distension.      Palpations: Abdomen is soft.      Tenderness: There is no abdominal tenderness. There is no guarding.   Skin:     General: Skin is warm and dry.      Coloration: Skin is not jaundiced.   Neurological:      Mental Status: He is alert.   Psychiatric:         Behavior: Behavior is cooperative.     Imaging Results (Most Recent)       None          Body mass index is 25.06 kg/m².    Assessment & Plan   Diagnoses and all orders for this visit:    1. Lower abdominal pain (Primary)  -     Case Request; Standing  -     Implement Anesthesia Orders Day of Procedure; Standing  -     Obtain Informed Consent; Standing  -     Verify bowel prep was successful; Standing  -     Give tap water enema if bowel prep was insufficient; Standing  -     Case Request    2. Encounter for screening for malignant neoplasm of colon    3. Gastroesophageal reflux disease, unspecified whether esophagitis present  -     Case Request; Standing  -      Implement Anesthesia Orders Day of Procedure; Standing  -     Obtain Informed Consent; Standing  -     Case Request    4. Celestin's esophagus without dysplasia      COLONOSCOPY WITH ANESTHESIA (N/A), ESOPHAGOGASTRODUODENOSCOPY WITH ANESTHESIA (N/A)    Continue daily Omeprazole as currently taking with further recommendations pending finding on EGD, will likely need to increase PPI  Take PPI 30 min prior to breakfast   Decrease caffeine, nicotine, etoh- all contribute to acid reflux  Do not eat 2-3 hours within lying down, elevated HOB 4-6 inches    Advised pt to stop use of NSAIDs, Fish Oil, and MV 5 days prior to procedure, per Dr Sams protocol.  Tylenol based products are ok to take.  Pt verbalized understanding.     All risks, benefits, alternatives, and indications of colonoscopy procedure have been discussed with the patient. Risks to include perforation of the colon requiring possible surgery or colostomy, risk of bleeding from biopsies or removal of colon tissue, possibility of missing a colon polyp or cancer, or adverse drug reaction.  Benefits to include the diagnosis and management of disease of the colon and rectum. Alternatives to include barium enema, radiographic evaluation, lab testing or no intervention. He verbalizes understanding and agrees.     The risks of the endoscopy were discussed in detail.  We discussed the risks of perforation (one out of [id number], riskier with dilation), bleeding (one out of 500), and the rare risks of infection, adverse reaction to anesthesia, respiratory failure, cardiac failure including MI and adverse reaction to medications, etc.  We discussed consequences that could occur if a risk were to develop such as the need for hospitalization, blood transfusion, surgical intervention, medications, pain and disability and death.  Alternatives include not doing anything, or pursuing an UGI series which only offers a diagnosis with potential less accuracy compared to EGD.   The patient verbalizes understanding and agrees to proceed.     Reymundo Espinoza, APRN  08/16/23        Patient Instructions   Gastroesophageal Reflux Disease, Adult    Gastroesophageal reflux disease (GERD) happens when acid from your stomach flows up into the esophagus. When acid comes in contact with the esophagus, the acid causes soreness (inflammation) in the esophagus. Over time, GERD may create small holes (ulcers) in the lining of the esophagus.  CAUSES    Increased body weight. This puts pressure on the stomach, making acid rise from the stomach into the esophagus.  Smoking. This increases acid production in the stomach.  Drinking alcohol. This causes decreased pressure in the lower esophageal sphincter (valve or ring of muscle between the esophagus and stomach), allowing acid from the stomach into the esophagus.  Late evening meals and a full stomach. This increases pressure and acid production in the stomach.  A malformed lower esophageal sphincter.  Sometimes, no cause is found.  SYMPTOMS    Burning pain in the lower part of the mid-chest behind the breastbone and in the mid-stomach area. This may occur twice a week or more often.  Trouble swallowing.  Sore throat.  Dry cough.  Asthma-like symptoms including chest tightness, shortness of breath, or wheezing.  DIAGNOSIS    Your caregiver may be able to diagnose GERD based on your symptoms. In some cases, X-rays and other tests may be done to check for complications or to check the condition of your stomach and esophagus.  TREATMENT    Your caregiver may recommend over-the-counter or prescription medicines to help decrease acid production. Ask your caregiver before starting or adding any new medicines.    HOME CARE INSTRUCTIONS    Change the factors that you can control. Ask your caregiver for guidance concerning weight loss, quitting smoking, and alcohol consumption.  Avoid foods and drinks that make your symptoms worse, such as:  Caffeine or alcoholic  drinks.  Chocolate.  Peppermint or mint flavorings.  Garlic and onions.  Spicy foods.  Citrus fruits, such as oranges, xavier, or limes.  Tomato-based foods such as sauce, chili, salsa, and pizza.  Fried and fatty foods.  Avoid lying down for the 3 hours prior to your bedtime or prior to taking a nap.  Eat small, frequent meals instead of large meals.  Wear loose-fitting clothing. Do not wear anything tight around your waist that causes pressure on your stomach.  Raise the head of your bed 6 to 8 inches with wood blocks to help you sleep. Extra pillows will not help.  Only take over-the-counter or prescription medicines for pain, discomfort, or fever as directed by your caregiver.  Do not take aspirin, ibuprofen, or other nonsteroidal anti-inflammatory drugs (NSAIDs).  SEEK IMMEDIATE MEDICAL CARE IF:    You have pain in your arms, neck, jaw, teeth, or back.  Your pain increases or changes in intensity or duration.  You develop nausea, vomiting, or sweating (diaphoresis).  You develop shortness of breath, or you faint.  Your vomit is green, yellow, black, or looks like coffee grounds or blood.  Your stool is red, bloody, or black.  These symptoms could be signs of other problems, such as heart disease, gastric bleeding, or esophageal bleeding.  MAKE SURE YOU:    Understand these instructions.  Will watch your condition.  Will get help right away if you are not doing well or get worse.     This information is not intended to replace advice given to you by your health care provider. Make sure you discuss any questions you have with your health care provider.     Document Released: 09/27/2006 Document Revised: 01/08/2016 Document Reviewed: 04/13/2016  [employer] Interactive Patient Education ©2016 [employer]      Celestin's Esophagus    Celestin's esophagus occurs when the lining of the esophagus is damaged. The esophagus is the tube that carries food from the mouth to the stomach. With Celestin's esophagus, the lining of  the esophagus gets replaced by material that is similar to the lining in the intestines. This process is called intestinal metaplasia. A small number of people with Celestin's esophagus develop esophageal cancer.  CAUSES    The exact cause of Celestin's esophagus is unknown.  SYMPTOMS    Most people with Celestin's esophagus do not have symptoms. However, many patients also have gastroesophageal reflux disease (GERD). GERD can cause heartburn, trouble swallowing, and a dry cough.  DIAGNOSIS  Celestin's esophagus is diagnosed by an exam called upper gastrointestinal endoscopy. A thin, flexible tube (endoscope) is passed down the esophagus. The endoscope has a light and camera on the end. Your caregiver uses the endoscope to view the inside of the esophagus. A tissue sample may also be taken and examined under a microscope (biopsy). If cancer cells are found during the biopsy, this condition is called dysplasia.  TREATMENT    If you have no dysplasia or low-grade dysplasia, your caregiver may recommend no treatment or only taking medicines to treat GERD. Sometimes, taking acid-blocking drugs to treat GERD helps improve the tissue affected by Celestin's esophagus. Your caregiver may also recommend periodic esophageal exams.  If you have high-grade dysplasia, treatment may include removing the damaged parts of the esophagus. This can be done by heating, freezing, or surgically removing the tissue. In some cases, surgery may be done to remove most of the esophagus. The stomach is then attached to the remaining portion of the esophagus.  HOME CARE INSTRUCTIONS  Take acid-blocking drugs for GERD if recommended by your caregiver.  Keep all follow-up appointments as directed by your caregiver. You may need periodic esophageal exams.  SEEK IMMEDIATE MEDICAL CARE IF:  You have chest pain.  You have trouble swallowing.  You vomit blood or material that looks like coffee grounds.  Your stools are bright red or dark.  This information  is not intended to replace advice given to you by your health care provider. Make sure you discuss any questions you have with your health care provider.  Document Released: 03/09/2005 Document Revised: 06/18/2013 Document Reviewed: 02/26/2013  [employer] Interactive Patient Education ©2016 [employer]

## 2023-08-16 ENCOUNTER — OFFICE VISIT (OUTPATIENT)
Dept: GASTROENTEROLOGY | Facility: CLINIC | Age: 68
End: 2023-08-16
Payer: MEDICARE

## 2023-08-16 VITALS
HEIGHT: 67 IN | TEMPERATURE: 97.7 F | BODY MASS INDEX: 25.11 KG/M2 | OXYGEN SATURATION: 96 % | SYSTOLIC BLOOD PRESSURE: 100 MMHG | DIASTOLIC BLOOD PRESSURE: 60 MMHG | HEART RATE: 88 BPM | WEIGHT: 160 LBS

## 2023-08-16 DIAGNOSIS — K22.70 BARRETT'S ESOPHAGUS WITHOUT DYSPLASIA: ICD-10-CM

## 2023-08-16 DIAGNOSIS — R10.30 LOWER ABDOMINAL PAIN: Primary | ICD-10-CM

## 2023-08-16 DIAGNOSIS — K21.9 GASTROESOPHAGEAL REFLUX DISEASE, UNSPECIFIED WHETHER ESOPHAGITIS PRESENT: ICD-10-CM

## 2023-08-16 DIAGNOSIS — Z12.11 ENCOUNTER FOR SCREENING FOR MALIGNANT NEOPLASM OF COLON: ICD-10-CM

## 2023-08-16 PROCEDURE — 99214 OFFICE O/P EST MOD 30 MIN: CPT | Performed by: NURSE PRACTITIONER

## 2023-08-16 PROCEDURE — 1159F MED LIST DOCD IN RCRD: CPT | Performed by: NURSE PRACTITIONER

## 2023-08-16 PROCEDURE — 1160F RVW MEDS BY RX/DR IN RCRD: CPT | Performed by: NURSE PRACTITIONER

## 2023-08-16 NOTE — PATIENT INSTRUCTIONS
Gastroesophageal Reflux Disease, Adult    Gastroesophageal reflux disease (GERD) happens when acid from your stomach flows up into the esophagus. When acid comes in contact with the esophagus, the acid causes soreness (inflammation) in the esophagus. Over time, GERD may create small holes (ulcers) in the lining of the esophagus.  CAUSES    Increased body weight. This puts pressure on the stomach, making acid rise from the stomach into the esophagus.  Smoking. This increases acid production in the stomach.  Drinking alcohol. This causes decreased pressure in the lower esophageal sphincter (valve or ring of muscle between the esophagus and stomach), allowing acid from the stomach into the esophagus.  Late evening meals and a full stomach. This increases pressure and acid production in the stomach.  A malformed lower esophageal sphincter.  Sometimes, no cause is found.  SYMPTOMS    Burning pain in the lower part of the mid-chest behind the breastbone and in the mid-stomach area. This may occur twice a week or more often.  Trouble swallowing.  Sore throat.  Dry cough.  Asthma-like symptoms including chest tightness, shortness of breath, or wheezing.  DIAGNOSIS    Your caregiver may be able to diagnose GERD based on your symptoms. In some cases, X-rays and other tests may be done to check for complications or to check the condition of your stomach and esophagus.  TREATMENT    Your caregiver may recommend over-the-counter or prescription medicines to help decrease acid production. Ask your caregiver before starting or adding any new medicines.    HOME CARE INSTRUCTIONS    Change the factors that you can control. Ask your caregiver for guidance concerning weight loss, quitting smoking, and alcohol consumption.  Avoid foods and drinks that make your symptoms worse, such as:  Caffeine or alcoholic drinks.  Chocolate.  Peppermint or mint flavorings.  Garlic and onions.  Spicy foods.  Citrus fruits, such as oranges, xavier, or  limes.  Tomato-based foods such as sauce, chili, salsa, and pizza.  Fried and fatty foods.  Avoid lying down for the 3 hours prior to your bedtime or prior to taking a nap.  Eat small, frequent meals instead of large meals.  Wear loose-fitting clothing. Do not wear anything tight around your waist that causes pressure on your stomach.  Raise the head of your bed 6 to 8 inches with wood blocks to help you sleep. Extra pillows will not help.  Only take over-the-counter or prescription medicines for pain, discomfort, or fever as directed by your caregiver.  Do not take aspirin, ibuprofen, or other nonsteroidal anti-inflammatory drugs (NSAIDs).  SEEK IMMEDIATE MEDICAL CARE IF:    You have pain in your arms, neck, jaw, teeth, or back.  Your pain increases or changes in intensity or duration.  You develop nausea, vomiting, or sweating (diaphoresis).  You develop shortness of breath, or you faint.  Your vomit is green, yellow, black, or looks like coffee grounds or blood.  Your stool is red, bloody, or black.  These symptoms could be signs of other problems, such as heart disease, gastric bleeding, or esophageal bleeding.  MAKE SURE YOU:    Understand these instructions.  Will watch your condition.  Will get help right away if you are not doing well or get worse.     This information is not intended to replace advice given to you by your health care provider. Make sure you discuss any questions you have with your health care provider.     Document Released: 09/27/2006 Document Revised: 01/08/2016 Document Reviewed: 04/13/2016  [employer] Interactive Patient Education c2016 [employer]      Celestin's Esophagus    Celestin's esophagus occurs when the lining of the esophagus is damaged. The esophagus is the tube that carries food from the mouth to the stomach. With Celestin's esophagus, the lining of the esophagus gets replaced by material that is similar to the lining in the intestines. This process is called intestinal  metaplasia. A small number of people with Celestin's esophagus develop esophageal cancer.  CAUSES    The exact cause of Celestin's esophagus is unknown.  SYMPTOMS    Most people with Celestin's esophagus do not have symptoms. However, many patients also have gastroesophageal reflux disease (GERD). GERD can cause heartburn, trouble swallowing, and a dry cough.  DIAGNOSIS  Celestin's esophagus is diagnosed by an exam called upper gastrointestinal endoscopy. A thin, flexible tube (endoscope) is passed down the esophagus. The endoscope has a light and camera on the end. Your caregiver uses the endoscope to view the inside of the esophagus. A tissue sample may also be taken and examined under a microscope (biopsy). If cancer cells are found during the biopsy, this condition is called dysplasia.  TREATMENT    If you have no dysplasia or low-grade dysplasia, your caregiver may recommend no treatment or only taking medicines to treat GERD. Sometimes, taking acid-blocking drugs to treat GERD helps improve the tissue affected by Celestin's esophagus. Your caregiver may also recommend periodic esophageal exams.  If you have high-grade dysplasia, treatment may include removing the damaged parts of the esophagus. This can be done by heating, freezing, or surgically removing the tissue. In some cases, surgery may be done to remove most of the esophagus. The stomach is then attached to the remaining portion of the esophagus.  HOME CARE INSTRUCTIONS  Take acid-blocking drugs for GERD if recommended by your caregiver.  Keep all follow-up appointments as directed by your caregiver. You may need periodic esophageal exams.  SEEK IMMEDIATE MEDICAL CARE IF:  You have chest pain.  You have trouble swallowing.  You vomit blood or material that looks like coffee grounds.  Your stools are bright red or dark.  This information is not intended to replace advice given to you by your health care provider. Make sure you discuss any questions you have  with your health care provider.  Document Released: 03/09/2005 Document Revised: 06/18/2013 Document Reviewed: 02/26/2013  [employer] Interactive Patient Education c2016 [employer]

## 2023-08-21 ENCOUNTER — TELEPHONE (OUTPATIENT)
Dept: GASTROENTEROLOGY | Facility: CLINIC | Age: 68
End: 2023-08-21

## 2023-08-21 NOTE — TELEPHONE ENCOUNTER
Caller: Andi TREVIÑO    Relationship to patient: Self    Best call back number: [phone]    Patient is needing: PATIENT CALLED IN REQUESTING TO SPEAK WITH TYLER REGARDING THIS SCHEDULED PROCEDURE FOR 9/8/23, HE WANTS TO SEE IF HE CAN MOVE IT TO THE WEEK OF SEPTEMBER 15TH. HE WOULD LIKE A CALL BACK TO DISCUSS THIS PLEASE.

## 2023-09-15 ENCOUNTER — HOSPITAL ENCOUNTER (OUTPATIENT)
Facility: HOSPITAL | Age: 68
Setting detail: HOSPITAL OUTPATIENT SURGERY
Discharge: HOME | End: 2023-09-15
Attending: INTERNAL MEDICINE | Admitting: INTERNAL MEDICINE
Payer: MEDICARE

## 2023-09-15 ENCOUNTER — ANESTHESIA EVENT (OUTPATIENT)
Dept: GASTROENTEROLOGY | Facility: HOSPITAL | Age: 68
End: 2023-09-15
Payer: MEDICARE

## 2023-09-15 ENCOUNTER — ANESTHESIA (OUTPATIENT)
Dept: GASTROENTEROLOGY | Facility: HOSPITAL | Age: 68
End: 2023-09-15
Payer: MEDICARE

## 2023-09-15 ENCOUNTER — TELEPHONE (OUTPATIENT)
Dept: GASTROENTEROLOGY | Facility: CLINIC | Age: 68
End: 2023-09-15
Payer: MEDICARE

## 2023-09-15 VITALS
RESPIRATION RATE: 17 BRPM | DIASTOLIC BLOOD PRESSURE: 90 MMHG | SYSTOLIC BLOOD PRESSURE: 123 MMHG | TEMPERATURE: 96.5 F | HEART RATE: 85 BPM | OXYGEN SATURATION: 98 % | BODY MASS INDEX: 26.63 KG/M2 | HEIGHT: 64 IN | WEIGHT: 156 LBS

## 2023-09-15 DIAGNOSIS — R10.30 LOWER ABDOMINAL PAIN: ICD-10-CM

## 2023-09-15 DIAGNOSIS — K21.9 GASTROESOPHAGEAL REFLUX DISEASE, UNSPECIFIED WHETHER ESOPHAGITIS PRESENT: ICD-10-CM

## 2023-09-15 PROCEDURE — 25010000002 PROPOFOL 10 MG/ML EMULSION: Performed by: NURSE ANESTHETIST, CERTIFIED REGISTERED

## 2023-09-15 PROCEDURE — G0121 COLON CA SCRN NOT HI RSK IND: HCPCS | Performed by: INTERNAL MEDICINE

## 2023-09-15 PROCEDURE — 43239 EGD BIOPSY SINGLE/MULTIPLE: CPT | Performed by: INTERNAL MEDICINE

## 2023-09-15 PROCEDURE — 87081 CULTURE SCREEN ONLY: CPT | Performed by: INTERNAL MEDICINE

## 2023-09-15 RX ORDER — PROPOFOL 10 MG/ML
VIAL (ML) INTRAVENOUS AS NEEDED
Status: DISCONTINUED | OUTPATIENT
Start: 2023-09-15 | End: 2023-09-15 | Stop reason: SURG

## 2023-09-15 RX ORDER — SODIUM CHLORIDE 9 MG/ML
500 INJECTION, SOLUTION INTRAVENOUS CONTINUOUS PRN
Status: DISCONTINUED | OUTPATIENT
Start: 2023-09-15 | End: 2023-09-15 | Stop reason: HOSPADM

## 2023-09-15 RX ORDER — LIDOCAINE HYDROCHLORIDE 20 MG/ML
INJECTION, SOLUTION EPIDURAL; INFILTRATION; INTRACAUDAL; PERINEURAL AS NEEDED
Status: DISCONTINUED | OUTPATIENT
Start: 2023-09-15 | End: 2023-09-15 | Stop reason: SURG

## 2023-09-15 RX ORDER — LIDOCAINE HYDROCHLORIDE 10 MG/ML
0.5 INJECTION, SOLUTION EPIDURAL; INFILTRATION; INTRACAUDAL; PERINEURAL ONCE AS NEEDED
Status: DISCONTINUED | OUTPATIENT
Start: 2023-09-15 | End: 2023-09-15 | Stop reason: HOSPADM

## 2023-09-15 RX ORDER — SODIUM CHLORIDE 0.9 % (FLUSH) 0.9 %
10 SYRINGE (ML) INJECTION AS NEEDED
Status: DISCONTINUED | OUTPATIENT
Start: 2023-09-15 | End: 2023-09-15 | Stop reason: HOSPADM

## 2023-09-15 RX ADMIN — PROPOFOL INJECTABLE EMULSION 100 MG: 10 INJECTION, EMULSION INTRAVENOUS at 07:42

## 2023-09-15 RX ADMIN — LIDOCAINE HYDROCHLORIDE 100 MG: 20 INJECTION, SOLUTION EPIDURAL; INFILTRATION; INTRACAUDAL; PERINEURAL at 07:42

## 2023-09-15 RX ADMIN — PROPOFOL INJECTABLE EMULSION 50 MG: 10 INJECTION, EMULSION INTRAVENOUS at 07:48

## 2023-09-15 RX ADMIN — SODIUM CHLORIDE 500 ML: 9 INJECTION, SOLUTION INTRAVENOUS at 07:24

## 2023-09-15 NOTE — ANESTHESIA POSTPROCEDURE EVALUATION
Patient: Andi TREVIÑO    Procedure Summary       Date: 09/15/23 Room / Location: [facility] ENDOSCOPY 5 / [facility]    Anesthesia Start: 0740 Anesthesia Stop: 0757    Procedures:       COLONOSCOPY WITH ANESTHESIA      ESOPHAGOGASTRODUODENOSCOPY WITH ANESTHESIA Diagnosis:       Gastroesophageal reflux disease, unspecified whether esophagitis present      Lower abdominal pain      (Gastroesophageal reflux disease, unspecified whether esophagitis present [K21.9])      (Lower abdominal pain [R10.30])    Surgeons: Orestes Sams DO Provider: Romaine Aldana CRNA    Anesthesia Type: MAC ASA Status: 2            Anesthesia Type: MAC    Vitals  Vitals Value Taken Time   BP     Temp     Pulse 89 09/15/23 0757   Resp     SpO2 98 % 09/15/23 0757   Vitals shown include unvalidated device data.        Post Anesthesia Care and Evaluation    Patient location during evaluation: PHASE II  Patient participation: complete - patient participated  Level of consciousness: awake and alert  Pain score: 0  Pain management: adequate    Airway patency: patent  Anesthetic complications: No anesthetic complications  PONV Status: none  Cardiovascular status: acceptable and stable  Respiratory status: acceptable  Hydration status: acceptable

## 2023-09-15 NOTE — ANESTHESIA PREPROCEDURE EVALUATION
Anesthesia Evaluation     Nursing notes reviewed   no history of anesthetic complications:   NPO Solid Status: > 8 hours  NPO Liquid Status: > 2 hours           Airway   Mallampati: I  TM distance: >3 FB  Neck ROM: full  No difficulty expected  Dental      Pulmonary    (-) not a smoker  Cardiovascular   Exercise tolerance: good (4-7 METS)    (+) hypertension  (-) CAD      Neuro/Psych  GI/Hepatic/Renal/Endo    (+) GERD, renal disease (resolved) ARF  (-) diabetes    Musculoskeletal     (+) back pain  Abdominal    Substance History      OB/GYN          Other                      Anesthesia Plan    ASA 2     MAC     intravenous induction     Anesthetic plan, risks, benefits, and alternatives have been provided, discussed and informed consent has been obtained with: patient.    CODE STATUS:

## 2023-09-16 LAB — UREASE TISS QL: NEGATIVE

## 2023-09-27 DIAGNOSIS — R10.30 LOWER ABDOMINAL PAIN: Primary | ICD-10-CM

## 2023-10-23 ENCOUNTER — ANESTHESIA EVENT (OUTPATIENT)
Dept: GASTROENTEROLOGY | Facility: HOSPITAL | Age: 68
End: 2023-10-23
Payer: MEDICARE

## 2023-10-23 ENCOUNTER — HOSPITAL ENCOUNTER (OUTPATIENT)
Facility: HOSPITAL | Age: 68
Setting detail: HOSPITAL OUTPATIENT SURGERY
Discharge: HOME | End: 2023-10-23
Attending: INTERNAL MEDICINE | Admitting: INTERNAL MEDICINE
Payer: MEDICARE

## 2023-10-23 ENCOUNTER — ANESTHESIA (OUTPATIENT)
Dept: GASTROENTEROLOGY | Facility: HOSPITAL | Age: 68
End: 2023-10-23
Payer: MEDICARE

## 2023-10-23 VITALS
OXYGEN SATURATION: 96 % | RESPIRATION RATE: 20 BRPM | HEART RATE: 87 BPM | HEIGHT: 63 IN | SYSTOLIC BLOOD PRESSURE: 122 MMHG | BODY MASS INDEX: 27.82 KG/M2 | DIASTOLIC BLOOD PRESSURE: 90 MMHG | TEMPERATURE: 96.7 F | WEIGHT: 157 LBS

## 2023-10-23 DIAGNOSIS — R10.30 LOWER ABDOMINAL PAIN: ICD-10-CM

## 2023-10-23 PROCEDURE — 25810000003 SODIUM CHLORIDE 0.9 % SOLUTION: Performed by: ANESTHESIOLOGY

## 2023-10-23 PROCEDURE — 25010000002 PROPOFOL 10 MG/ML EMULSION: Performed by: NURSE ANESTHETIST, CERTIFIED REGISTERED

## 2023-10-23 PROCEDURE — G0121 COLON CA SCRN NOT HI RSK IND: HCPCS | Performed by: INTERNAL MEDICINE

## 2023-10-23 RX ORDER — SODIUM CHLORIDE 9 MG/ML
100 INJECTION, SOLUTION INTRAVENOUS CONTINUOUS
Status: CANCELLED | OUTPATIENT
Start: 2023-10-23

## 2023-10-23 RX ORDER — SODIUM CHLORIDE 9 MG/ML
500 INJECTION, SOLUTION INTRAVENOUS CONTINUOUS PRN
Status: DISCONTINUED | OUTPATIENT
Start: 2023-10-23 | End: 2023-10-23 | Stop reason: HOSPADM

## 2023-10-23 RX ORDER — SODIUM CHLORIDE 0.9 % (FLUSH) 0.9 %
10 SYRINGE (ML) INJECTION AS NEEDED
Status: CANCELLED | OUTPATIENT
Start: 2023-10-23

## 2023-10-23 RX ORDER — SODIUM CHLORIDE 9 MG/ML
40 INJECTION, SOLUTION INTRAVENOUS AS NEEDED
Status: CANCELLED | OUTPATIENT
Start: 2023-10-23

## 2023-10-23 RX ORDER — LIDOCAINE HYDROCHLORIDE 10 MG/ML
0.5 INJECTION, SOLUTION EPIDURAL; INFILTRATION; INTRACAUDAL; PERINEURAL ONCE AS NEEDED
Status: DISCONTINUED | OUTPATIENT
Start: 2023-10-23 | End: 2023-10-23 | Stop reason: HOSPADM

## 2023-10-23 RX ORDER — SODIUM CHLORIDE 0.9 % (FLUSH) 0.9 %
10 SYRINGE (ML) INJECTION EVERY 12 HOURS SCHEDULED
Status: CANCELLED | OUTPATIENT
Start: 2023-10-23

## 2023-10-23 RX ORDER — PROPOFOL 10 MG/ML
VIAL (ML) INTRAVENOUS AS NEEDED
Status: DISCONTINUED | OUTPATIENT
Start: 2023-10-23 | End: 2023-10-23 | Stop reason: SURG

## 2023-10-23 RX ORDER — LIDOCAINE HYDROCHLORIDE 20 MG/ML
INJECTION, SOLUTION EPIDURAL; INFILTRATION; INTRACAUDAL; PERINEURAL AS NEEDED
Status: DISCONTINUED | OUTPATIENT
Start: 2023-10-23 | End: 2023-10-23 | Stop reason: SURG

## 2023-10-23 RX ORDER — SODIUM CHLORIDE 0.9 % (FLUSH) 0.9 %
10 SYRINGE (ML) INJECTION AS NEEDED
Status: DISCONTINUED | OUTPATIENT
Start: 2023-10-23 | End: 2023-10-23 | Stop reason: HOSPADM

## 2023-10-23 RX ADMIN — SODIUM CHLORIDE 500 ML: 9 INJECTION, SOLUTION INTRAVENOUS at 10:44

## 2023-10-23 RX ADMIN — LIDOCAINE HYDROCHLORIDE 100 MG: 20 INJECTION, SOLUTION EPIDURAL; INFILTRATION; INTRACAUDAL; PERINEURAL at 11:00

## 2023-10-23 RX ADMIN — PROPOFOL INJECTABLE EMULSION 300 MG: 10 INJECTION, EMULSION INTRAVENOUS at 11:00

## 2023-10-23 NOTE — H&P
[facility] Gastroenterology  Pre Procedure History & Physical    Chief Complaint:   Screening    Subjective     HPI:   Screening    Past Medical History:   Past Medical History:   Diagnosis Date    Anemia     Diverticulosis     GERD (gastroesophageal reflux disease)     Hypertension     Lumbar back pain        Past Surgical History:  Past Surgical History:   Procedure Laterality Date    COLONOSCOPY N/A 11/15/2016    Procedure: COLONOSCOPY WITH ANESTHESIA;  Surgeon: Orestes Sams DO;  Location: [facility] ENDOSCOPY;  Service:     COLONOSCOPY N/A 8/15/2022    Procedure: COLONOSCOPY WITH ANESTHESIA;  Surgeon: Orestes Sams DO;  Location: [facility] ENDOSCOPY;  Service: Gastroenterology;  Laterality: N/A;  preop; abdominal pain  postop; diverticulosis; suboptimal prep;   PCP Peter Keene     COLONOSCOPY N/A 9/15/2023    Procedure: COLONOSCOPY WITH ANESTHESIA;  Surgeon: Orestes Sams DO;  Location: [facility] ENDOSCOPY;  Service: Gastroenterology;  Laterality: N/A;  Pre: Abd pain;  Post: Diverticulosis, Poor Prep;  Peter Keene Jr., MD    ENDOSCOPY N/A 11/14/2016    Procedure: ESOPHAGOGASTRODUODENOSCOPY WITH ANESTHESIA;  Surgeon: Orestes Sams DO;  Location: [facility] ENDOSCOPY;  Service:     ENDOSCOPY N/A 9/15/2023    Procedure: ESOPHAGOGASTRODUODENOSCOPY WITH ANESTHESIA;  Surgeon: Orestes Sams DO;  Location: [facility] ENDOSCOPY;  Service: Gastroenterology;  Laterality: N/A;  Pre: GERD;  Post: Normal;  Peter Keene Jr., MD    HERNIA REPAIR      REPLACEMENT TOTAL KNEE      TONSILLECTOMY      UMBILICAL HERNIA REPAIR N/A 5/4/2018    Procedure: OPEN UMBILICAL HERNIA REPAIR WITH MESH;  Surgeon: Augusta Sherman MD;  Location: [facility] OR;  Service: General       Family History:  Family History   Problem Relation Age of Onset    No Known Problems Mother     Stomach cancer Father     Heart disease Father     Colon cancer Father     Colon polyps Neg Hx     Esophageal cancer Neg Hx        Social History:   reports  "that he has quit smoking. His smoking use included cigars. He has never used smokeless tobacco. He reports current alcohol use of about 1.0 standard drink of alcohol per week. He reports that he does not use drugs.    Medications:   Prior to Admission medications    Medication Sig Start Date End Date Taking? Authorizing Provider   multivitamin with minerals tablet tablet Take 1 tablet by mouth Daily.   Yes Rabia Pringle MD   cyclobenzaprine (FLEXERIL) 5 MG tablet Take 1 tablet by mouth 2 (Two) Times a Day As Needed for Muscle Spasms.    Rabia Pringle MD   ferrous sulfate 324 (65 FE) MG tablet delayed-release EC tablet Take 1 tablet by mouth Daily With Breakfast.    Rabia Pringle MD   hydroCHLOROthiazide (HYDRODIURIL) 12.5 MG tablet Take 1 tablet by mouth Daily. 9/24/22   Rabia Pringle MD   Methylcellulose, Laxative, (FIBER THERAPY PO) Take 1 capsule by mouth 6 (Six) Times a Day.    Emergency, Nurse Epic, RN   omeprazole (prilOSEC) 10 MG capsule Take 1 tablet by mouth Daily.    ProviderRabia MD   sucralfate (CARAFATE) 1 g tablet Take 2 tablets by mouth 2 (Two) Times a Day Before Meals.  Patient not taking: Reported on 8/16/2023 11/11/22   Alhaji Ramirez,        Allergies:  Patient has no known allergies.    ROS:    General: Weight stable  Resp: No SOA  Cardiovascular: No CP    Objective     Blood pressure 127/82, pulse 101, temperature 96.7 °F (35.9 °C), temperature source Temporal, resp. rate 20, height 160 cm (63\"), weight 71.2 kg (157 lb), SpO2 98%.    Physical Exam   Constitutional: Pt is oriented to person, place, and in no distress.   HENT: Mouth/Throat: Oropharynx is clear.   Cardiovascular: Normal rate, regular rhythm.    Pulmonary/Chest: Effort normal. No respiratory distress. No  wheezes.   Abdominal: Soft. Non-distended.  Skin: Skin is warm and dry.   Psychiatric: Mood, memory, affect and judgment appear normal.     Assessment & Plan "     Diagnosis:  Screening/Recent Poor Prep    Anticipated Surgical Procedure:  C-scope    The risks, benefits, and alternatives of this procedure have been discussed with the patient or the responsible party- the patient understands and agrees to proceed.

## 2023-10-23 NOTE — ANESTHESIA PREPROCEDURE EVALUATION
Anesthesia Evaluation     Nursing notes reviewed   no history of anesthetic complications:   NPO Solid Status: > 8 hours  NPO Liquid Status: > 2 hours           Airway   Mallampati: I  TM distance: >3 FB  Neck ROM: full  No difficulty expected  Dental      Pulmonary    (-) not a smoker  Cardiovascular   Exercise tolerance: good (4-7 METS)    (+) hypertension  (-) CAD      Neuro/Psych  GI/Hepatic/Renal/Endo    (+) GERD, renal disease (resolved)- ARF  (-) diabetes    Musculoskeletal     (+) back pain  Abdominal    Substance History      OB/GYN          Other                        Anesthesia Plan    ASA 2     MAC     intravenous induction     Anesthetic plan, risks, benefits, and alternatives have been provided, discussed and informed consent has been obtained with: patient.      CODE STATUS:

## 2023-10-23 NOTE — ANESTHESIA POSTPROCEDURE EVALUATION
Patient: Andi TREVIÑO    Procedure Summary       Date: 10/23/23 Room / Location: [facility] ENDOSCOPY 4 / [facility] ENDOSCOPY    Anesthesia Start: 1058 Anesthesia Stop: 1118    Procedure: COLONOSCOPY WITH ANESTHESIA Diagnosis:       Lower abdominal pain      (Lower abdominal pain [R10.30])    Surgeons: Orestes Sams DO Provider: Mickey Green CRNA    Anesthesia Type: MAC ASA Status: 2            Anesthesia Type: MAC    Vitals  No vitals data found for the desired time range.          Post Anesthesia Care and Evaluation    Patient location during evaluation: PHASE II  Patient participation: complete - patient participated  Level of consciousness: awake and alert  Pain score: 0    Airway patency: patent  Anesthetic complications: No anesthetic complications  PONV Status: none  Cardiovascular status: acceptable  Respiratory status: acceptable  Hydration status: acceptable

## 2023-11-03 ENCOUNTER — TELEPHONE (OUTPATIENT)
Dept: GASTROENTEROLOGY | Facility: CLINIC | Age: 68
End: 2023-11-03
Payer: MEDICARE

## 2024-08-02 ENCOUNTER — HOSPITAL ENCOUNTER (OUTPATIENT)
Dept: PREADMISSION TESTING | Age: 69
Discharge: HOME | End: 2024-08-06
Payer: MEDICARE

## 2024-08-02 VITALS — WEIGHT: 172 LBS | BODY MASS INDEX: 27.64 KG/M2 | HEIGHT: 66 IN

## 2024-08-02 LAB
ANION GAP SERPL CALCULATED.3IONS-SCNC: 11 MMOL/L (ref 7–19)
APTT PPP: 31.2 SEC (ref 26–36.2)
BASOPHILS # BLD: 0 K/UL (ref 0–0.2)
BASOPHILS NFR BLD: 0.5 % (ref 0–1)
BUN SERPL-MCNC: 18 MG/DL (ref 8–23)
CALCIUM SERPL-MCNC: 9.1 MG/DL (ref 8.8–10.2)
CHLORIDE SERPL-SCNC: 102 MMOL/L (ref 98–111)
CO2 SERPL-SCNC: 24 MMOL/L (ref 22–29)
CREAT SERPL-MCNC: 1 MG/DL (ref 0.5–1.2)
EOSINOPHIL # BLD: 0.3 K/UL (ref 0–0.6)
EOSINOPHIL NFR BLD: 5 % (ref 0–5)
ERYTHROCYTE [DISTWIDTH] IN BLOOD BY AUTOMATED COUNT: 14.6 % (ref 11.5–14.5)
GLUCOSE SERPL-MCNC: 110 MG/DL (ref 74–109)
HCT VFR BLD AUTO: 40.4 % (ref 42–52)
HGB BLD-MCNC: 12.9 G/DL (ref 14–18)
IMM GRANULOCYTES # BLD: 0 K/UL
INR PPP: 1.01 (ref 0.88–1.18)
LYMPHOCYTES # BLD: 1.3 K/UL (ref 1.1–4.5)
LYMPHOCYTES NFR BLD: 23.2 % (ref 20–40)
MCH RBC QN AUTO: 29.6 PG (ref 27–31)
MCHC RBC AUTO-ENTMCNC: 31.9 G/DL (ref 33–37)
MCV RBC AUTO: 92.7 FL (ref 80–94)
MONOCYTES # BLD: 0.6 K/UL (ref 0–0.9)
MONOCYTES NFR BLD: 10.3 % (ref 0–10)
MRSA DNA SPEC QL NAA+PROBE: NOT DETECTED
NEUTROPHILS # BLD: 3.4 K/UL (ref 1.5–7.5)
NEUTS SEG NFR BLD: 60.8 % (ref 50–65)
PLATELET # BLD AUTO: 255 K/UL (ref 130–400)
PMV BLD AUTO: 9.9 FL (ref 9.4–12.4)
POTASSIUM SERPL-SCNC: 3.7 MMOL/L (ref 3.5–5)
PROTHROMBIN TIME: 13 SEC (ref 12–14.6)
RBC # BLD AUTO: 4.36 M/UL (ref 4.7–6.1)
SODIUM SERPL-SCNC: 137 MMOL/L (ref 136–145)
WBC # BLD AUTO: 5.6 K/UL (ref 4.8–10.8)

## 2024-08-02 PROCEDURE — 93005 ELECTROCARDIOGRAM TRACING: CPT | Performed by: ORTHOPAEDIC SURGERY

## 2024-08-02 PROCEDURE — 80048 BASIC METABOLIC PNL TOTAL CA: CPT

## 2024-08-02 PROCEDURE — 87641 MR-STAPH DNA AMP PROBE: CPT

## 2024-08-02 PROCEDURE — 85025 COMPLETE CBC W/AUTO DIFF WBC: CPT

## 2024-08-02 PROCEDURE — 85610 PROTHROMBIN TIME: CPT

## 2024-08-02 PROCEDURE — 85730 THROMBOPLASTIN TIME PARTIAL: CPT

## 2024-08-02 RX ORDER — DOCUSATE SODIUM 100 MG/1
100 CAPSULE, LIQUID FILLED ORAL 2 TIMES DAILY PRN
COMMUNITY

## 2024-08-02 RX ORDER — TRANEXAMIC ACID 650 MG/1
1950 TABLET ORAL ONCE
Status: CANCELLED | OUTPATIENT
Start: 2024-08-08

## 2024-08-02 RX ORDER — CHOLECALCIFEROL (VITAMIN D3) 25 MCG
1 TABLET,CHEWABLE ORAL DAILY
COMMUNITY

## 2024-08-02 RX ORDER — M-VIT,TX,IRON,MINS/CALC/FOLIC 27MG-0.4MG
1 TABLET ORAL DAILY
COMMUNITY

## 2024-08-02 RX ORDER — SCOLOPAMINE TRANSDERMAL SYSTEM 1 MG/1
1 PATCH, EXTENDED RELEASE TRANSDERMAL ONCE
Status: CANCELLED | OUTPATIENT
Start: 2024-08-08

## 2024-08-02 RX ORDER — OXYCODONE HCL 10 MG/1
10 TABLET, FILM COATED, EXTENDED RELEASE ORAL ONCE
Status: CANCELLED | OUTPATIENT
Start: 2024-08-08

## 2024-08-02 RX ORDER — FERROUS SULFATE 325(65) MG
325 TABLET ORAL
COMMUNITY

## 2024-08-02 RX ORDER — DEXAMETHASONE SODIUM PHOSPHATE 10 MG/ML
10 INJECTION, SOLUTION INTRAMUSCULAR; INTRAVENOUS ONCE
Status: CANCELLED | OUTPATIENT
Start: 2024-08-08

## 2024-08-02 RX ORDER — METHYLCELLULOSE 500 MG/1
500 TABLET ORAL 2 TIMES DAILY
COMMUNITY

## 2024-08-02 RX ORDER — SENNOSIDES 8.6 MG
650 CAPSULE ORAL 3 TIMES DAILY
COMMUNITY

## 2024-08-02 RX ORDER — ACETAMINOPHEN 500 MG
1000 TABLET ORAL ONCE
Status: CANCELLED | OUTPATIENT
Start: 2024-08-08

## 2024-08-02 RX ORDER — HYDROCHLOROTHIAZIDE 12.5 MG/1
12.5 CAPSULE, GELATIN COATED ORAL DAILY
COMMUNITY

## 2024-08-02 RX ORDER — CYCLOBENZAPRINE HCL 10 MG
10 TABLET ORAL 3 TIMES DAILY PRN
COMMUNITY

## 2024-08-02 RX ORDER — CELECOXIB 200 MG/1
200 CAPSULE ORAL ONCE
Status: CANCELLED | OUTPATIENT
Start: 2024-08-08

## 2024-08-02 NOTE — DISCHARGE INSTRUCTIONS
using this soap on the body, please do not apply powders or lotions to your body.  After the shower the night before surgery, please dry off with a clean towel, sleep in freshly laundered pj's, and change your bed linen before going to sleep.      IF YOU HAVE A PET IN YOUR HOME, please do not allow your pet to sleep in the bed with you after you have showered with your surgery prep soap.     Please remember that it is not recommended to allow your pet to sleep with you post op, until your incision has healed.  This can increase your risk of post op infection.        [facility] Visitor Policy for Surgery Patients-Revised 6-    Visitors for surgery patients are essential for the patient's emotional well-being and care       post operatively.    2.   Visitor Expectations and Limitations       3.  One visitor allowed with patients in the preop/postop rooms.    4.  A second visitor may sit in the waiting area.    5.  No children under 13 allowed in the pre-post op areas unless they are the patient.    6.  Two people may be with an underage surgical/procedural patient in preop/postop        room.      7.  If you are admitted to the hospital post operatively, there are NO RESTRICTIONS on       the floor at this time.      8.  If you are admitted to ICU postoperatively, you may have one visitor in the room from        7A-7P.  A second visitor may sit in the ICU waiting room.  No overnight visitors in         ICU waiting room.

## 2024-08-04 LAB
EKG P AXIS: 33 DEGREES
EKG P-R INTERVAL: 170 MS
EKG Q-T INTERVAL: 380 MS
EKG QRS DURATION: 88 MS
EKG QTC CALCULATION (BAZETT): 395 MS
EKG T AXIS: 7 DEGREES

## 2024-08-08 ENCOUNTER — ANESTHESIA EVENT (OUTPATIENT)
Dept: OPERATING ROOM | Age: 69
End: 2024-08-08
Payer: MEDICARE

## 2024-08-08 ENCOUNTER — HOSPITAL ENCOUNTER (OUTPATIENT)
Age: 69
Setting detail: OUTPATIENT SURGERY
Discharge: HOME | End: 2024-08-08
Attending: ORTHOPAEDIC SURGERY | Admitting: ORTHOPAEDIC SURGERY
Payer: MEDICARE

## 2024-08-08 ENCOUNTER — ANESTHESIA (OUTPATIENT)
Dept: OPERATING ROOM | Age: 69
End: 2024-08-08
Payer: MEDICARE

## 2024-08-08 VITALS
OXYGEN SATURATION: 92 % | DIASTOLIC BLOOD PRESSURE: 77 MMHG | BODY MASS INDEX: 26.68 KG/M2 | HEIGHT: 67 IN | SYSTOLIC BLOOD PRESSURE: 137 MMHG | RESPIRATION RATE: 20 BRPM | WEIGHT: 170 LBS | HEART RATE: 83 BPM | TEMPERATURE: 97.2 F

## 2024-08-08 DIAGNOSIS — M17.11 PRIMARY OSTEOARTHRITIS OF RIGHT KNEE: Primary | ICD-10-CM

## 2024-08-08 PROCEDURE — 7100000000 HC PACU RECOVERY - FIRST 15 MIN: Performed by: ORTHOPAEDIC SURGERY

## 2024-08-08 PROCEDURE — 3700000000 HC ANESTHESIA ATTENDED CARE: Performed by: ORTHOPAEDIC SURGERY

## 2024-08-08 PROCEDURE — 6360000002 HC RX W HCPCS: Performed by: ANESTHESIOLOGY

## 2024-08-08 PROCEDURE — 2720000010 HC SURG SUPPLY STERILE: Performed by: ORTHOPAEDIC SURGERY

## 2024-08-08 PROCEDURE — 7100000010 HC PHASE II RECOVERY - FIRST 15 MIN: Performed by: ORTHOPAEDIC SURGERY

## 2024-08-08 PROCEDURE — 6360000002 HC RX W HCPCS: Performed by: NURSE ANESTHETIST, CERTIFIED REGISTERED

## 2024-08-08 PROCEDURE — 2709999900 HC NON-CHARGEABLE SUPPLY: Performed by: ORTHOPAEDIC SURGERY

## 2024-08-08 PROCEDURE — 2580000003 HC RX 258: Performed by: NURSE ANESTHETIST, CERTIFIED REGISTERED

## 2024-08-08 PROCEDURE — A4217 STERILE WATER/SALINE, 500 ML: HCPCS | Performed by: ORTHOPAEDIC SURGERY

## 2024-08-08 PROCEDURE — 7100000001 HC PACU RECOVERY - ADDTL 15 MIN: Performed by: ORTHOPAEDIC SURGERY

## 2024-08-08 PROCEDURE — 6360000002 HC RX W HCPCS: Performed by: ORTHOPAEDIC SURGERY

## 2024-08-08 PROCEDURE — 3600000005 HC SURGERY LEVEL 5 BASE: Performed by: ORTHOPAEDIC SURGERY

## 2024-08-08 PROCEDURE — 2500000003 HC RX 250 WO HCPCS: Performed by: ORTHOPAEDIC SURGERY

## 2024-08-08 PROCEDURE — 3700000001 HC ADD 15 MINUTES (ANESTHESIA): Performed by: ORTHOPAEDIC SURGERY

## 2024-08-08 PROCEDURE — 7100000011 HC PHASE II RECOVERY - ADDTL 15 MIN: Performed by: ORTHOPAEDIC SURGERY

## 2024-08-08 PROCEDURE — 64447 NJX AA&/STRD FEMORAL NRV IMG: CPT | Performed by: NURSE ANESTHETIST, CERTIFIED REGISTERED

## 2024-08-08 PROCEDURE — 3600000015 HC SURGERY LEVEL 5 ADDTL 15MIN: Performed by: ORTHOPAEDIC SURGERY

## 2024-08-08 PROCEDURE — C1713 ANCHOR/SCREW BN/BN,TIS/BN: HCPCS | Performed by: ORTHOPAEDIC SURGERY

## 2024-08-08 PROCEDURE — 6370000000 HC RX 637 (ALT 250 FOR IP): Performed by: ORTHOPAEDIC SURGERY

## 2024-08-08 PROCEDURE — C1776 JOINT DEVICE (IMPLANTABLE): HCPCS | Performed by: ORTHOPAEDIC SURGERY

## 2024-08-08 PROCEDURE — 2580000003 HC RX 258: Performed by: ANESTHESIOLOGY

## 2024-08-08 PROCEDURE — 2500000003 HC RX 250 WO HCPCS: Performed by: NURSE ANESTHETIST, CERTIFIED REGISTERED

## 2024-08-08 PROCEDURE — 2580000003 HC RX 258: Performed by: ORTHOPAEDIC SURGERY

## 2024-08-08 DEVICE — IMPLANTABLE DEVICE
Type: IMPLANTABLE DEVICE | Site: KNEE | Status: FUNCTIONAL
Brand: PERSONA®

## 2024-08-08 DEVICE — CEMENT BONE 40GM HI VISC PALACOS R: Type: IMPLANTABLE DEVICE | Site: KNEE | Status: FUNCTIONAL

## 2024-08-08 DEVICE — PSN TIB STM 5 DEG SZ D R: Type: IMPLANTABLE DEVICE | Site: KNEE | Status: FUNCTIONAL

## 2024-08-08 DEVICE — IMPLANTABLE DEVICE
Type: IMPLANTABLE DEVICE | Site: KNEE | Status: FUNCTIONAL
Brand: PERSONA® VIVACIT-E®

## 2024-08-08 RX ORDER — NITROGLYCERIN 20 MG/100ML
INJECTION INTRAVENOUS PRN
Status: DISCONTINUED | OUTPATIENT
Start: 2024-08-08 | End: 2024-08-08 | Stop reason: SDUPTHER

## 2024-08-08 RX ORDER — SODIUM CHLORIDE 0.9 % (FLUSH) 0.9 %
5-40 SYRINGE (ML) INJECTION PRN
Status: DISCONTINUED | OUTPATIENT
Start: 2024-08-08 | End: 2024-08-08 | Stop reason: HOSPADM

## 2024-08-08 RX ORDER — SODIUM CHLORIDE 0.9 % (FLUSH) 0.9 %
5-40 SYRINGE (ML) INJECTION EVERY 12 HOURS SCHEDULED
Status: DISCONTINUED | OUTPATIENT
Start: 2024-08-08 | End: 2024-08-08 | Stop reason: HOSPADM

## 2024-08-08 RX ORDER — ROPIVACAINE HYDROCHLORIDE 5 MG/ML
INJECTION, SOLUTION EPIDURAL; INFILTRATION; PERINEURAL
Status: COMPLETED | OUTPATIENT
Start: 2024-08-08 | End: 2024-08-08

## 2024-08-08 RX ORDER — LIDOCAINE HYDROCHLORIDE 10 MG/ML
INJECTION, SOLUTION INFILTRATION; PERINEURAL PRN
Status: DISCONTINUED | OUTPATIENT
Start: 2024-08-08 | End: 2024-08-08 | Stop reason: SDUPTHER

## 2024-08-08 RX ORDER — HYDROMORPHONE HYDROCHLORIDE 1 MG/ML
0.25 INJECTION, SOLUTION INTRAMUSCULAR; INTRAVENOUS; SUBCUTANEOUS EVERY 5 MIN PRN
Status: DISCONTINUED | OUTPATIENT
Start: 2024-08-08 | End: 2024-08-08 | Stop reason: HOSPADM

## 2024-08-08 RX ORDER — ROPIVACAINE HYDROCHLORIDE 5 MG/ML
30 INJECTION, SOLUTION EPIDURAL; INFILTRATION; PERINEURAL ONCE
Status: DISCONTINUED | OUTPATIENT
Start: 2024-08-08 | End: 2024-08-08 | Stop reason: HOSPADM

## 2024-08-08 RX ORDER — HYDROMORPHONE HYDROCHLORIDE 1 MG/ML
0.5 INJECTION, SOLUTION INTRAMUSCULAR; INTRAVENOUS; SUBCUTANEOUS EVERY 5 MIN PRN
Status: DISCONTINUED | OUTPATIENT
Start: 2024-08-08 | End: 2024-08-08 | Stop reason: HOSPADM

## 2024-08-08 RX ORDER — SODIUM CHLORIDE 9 MG/ML
INJECTION, SOLUTION INTRAVENOUS PRN
Status: DISCONTINUED | OUTPATIENT
Start: 2024-08-08 | End: 2024-08-08 | Stop reason: HOSPADM

## 2024-08-08 RX ORDER — SODIUM CHLORIDE, SODIUM LACTATE, POTASSIUM CHLORIDE, CALCIUM CHLORIDE 600; 310; 30; 20 MG/100ML; MG/100ML; MG/100ML; MG/100ML
INJECTION, SOLUTION INTRAVENOUS CONTINUOUS
Status: DISCONTINUED | OUTPATIENT
Start: 2024-08-08 | End: 2024-08-08 | Stop reason: HOSPADM

## 2024-08-08 RX ORDER — ONDANSETRON 2 MG/ML
4 INJECTION INTRAMUSCULAR; INTRAVENOUS
Status: DISCONTINUED | OUTPATIENT
Start: 2024-08-08 | End: 2024-08-08 | Stop reason: HOSPADM

## 2024-08-08 RX ORDER — OXYCODONE HCL 10 MG/1
10 TABLET, FILM COATED, EXTENDED RELEASE ORAL ONCE
Status: COMPLETED | OUTPATIENT
Start: 2024-08-08 | End: 2024-08-08

## 2024-08-08 RX ORDER — ASPIRIN 81 MG/1
81 TABLET, CHEWABLE ORAL 2 TIMES DAILY
Qty: 60 TABLET | Refills: 0 | Status: SHIPPED | OUTPATIENT
Start: 2024-08-08

## 2024-08-08 RX ORDER — SCOLOPAMINE TRANSDERMAL SYSTEM 1 MG/1
1 PATCH, EXTENDED RELEASE TRANSDERMAL ONCE
Status: DISCONTINUED | OUTPATIENT
Start: 2024-08-08 | End: 2024-08-08 | Stop reason: HOSPADM

## 2024-08-08 RX ORDER — ONDANSETRON 2 MG/ML
INJECTION INTRAMUSCULAR; INTRAVENOUS PRN
Status: DISCONTINUED | OUTPATIENT
Start: 2024-08-08 | End: 2024-08-08 | Stop reason: SDUPTHER

## 2024-08-08 RX ORDER — IBUPROFEN 400 MG/1
400 TABLET ORAL EVERY 8 HOURS PRN
Qty: 30 TABLET | Refills: 0 | Status: SHIPPED | OUTPATIENT
Start: 2024-08-08

## 2024-08-08 RX ORDER — KETOROLAC TROMETHAMINE 30 MG/ML
15 INJECTION, SOLUTION INTRAMUSCULAR; INTRAVENOUS ONCE
Status: COMPLETED | OUTPATIENT
Start: 2024-08-08 | End: 2024-08-08

## 2024-08-08 RX ORDER — CELECOXIB 200 MG/1
200 CAPSULE ORAL ONCE
Status: COMPLETED | OUTPATIENT
Start: 2024-08-08 | End: 2024-08-08

## 2024-08-08 RX ORDER — CEFAZOLIN SODIUM 1 G/3ML
INJECTION, POWDER, FOR SOLUTION INTRAMUSCULAR; INTRAVENOUS PRN
Status: DISCONTINUED | OUTPATIENT
Start: 2024-08-08 | End: 2024-08-08 | Stop reason: SDUPTHER

## 2024-08-08 RX ORDER — EPHEDRINE SULFATE/0.9% NACL/PF 25 MG/5 ML
SYRINGE (ML) INTRAVENOUS PRN
Status: DISCONTINUED | OUTPATIENT
Start: 2024-08-08 | End: 2024-08-08 | Stop reason: SDUPTHER

## 2024-08-08 RX ORDER — ROCURONIUM BROMIDE 10 MG/ML
INJECTION, SOLUTION INTRAVENOUS PRN
Status: DISCONTINUED | OUTPATIENT
Start: 2024-08-08 | End: 2024-08-08 | Stop reason: SDUPTHER

## 2024-08-08 RX ORDER — OXYCODONE HYDROCHLORIDE 5 MG/1
5 TABLET ORAL
Status: DISCONTINUED | OUTPATIENT
Start: 2024-08-08 | End: 2024-08-08 | Stop reason: HOSPADM

## 2024-08-08 RX ORDER — HYDROMORPHONE HYDROCHLORIDE 1 MG/ML
0.5 INJECTION, SOLUTION INTRAMUSCULAR; INTRAVENOUS; SUBCUTANEOUS EVERY 5 MIN PRN
Status: COMPLETED | OUTPATIENT
Start: 2024-08-08 | End: 2024-08-08

## 2024-08-08 RX ORDER — TRANEXAMIC ACID 650 MG/1
1950 TABLET ORAL ONCE
Status: COMPLETED | OUTPATIENT
Start: 2024-08-08 | End: 2024-08-08

## 2024-08-08 RX ORDER — NALOXONE HYDROCHLORIDE 0.4 MG/ML
INJECTION, SOLUTION INTRAMUSCULAR; INTRAVENOUS; SUBCUTANEOUS PRN
Status: DISCONTINUED | OUTPATIENT
Start: 2024-08-08 | End: 2024-08-08 | Stop reason: HOSPADM

## 2024-08-08 RX ORDER — FENTANYL CITRATE 50 UG/ML
INJECTION, SOLUTION INTRAMUSCULAR; INTRAVENOUS PRN
Status: DISCONTINUED | OUTPATIENT
Start: 2024-08-08 | End: 2024-08-08 | Stop reason: SDUPTHER

## 2024-08-08 RX ORDER — DEXAMETHASONE SODIUM PHOSPHATE 10 MG/ML
10 INJECTION, SOLUTION INTRAMUSCULAR; INTRAVENOUS ONCE
Status: DISCONTINUED | OUTPATIENT
Start: 2024-08-08 | End: 2024-08-08 | Stop reason: HOSPADM

## 2024-08-08 RX ORDER — PROPOFOL 10 MG/ML
INJECTION, EMULSION INTRAVENOUS PRN
Status: DISCONTINUED | OUTPATIENT
Start: 2024-08-08 | End: 2024-08-08 | Stop reason: SDUPTHER

## 2024-08-08 RX ORDER — SODIUM CHLORIDE, SODIUM LACTATE, POTASSIUM CHLORIDE, CALCIUM CHLORIDE 600; 310; 30; 20 MG/100ML; MG/100ML; MG/100ML; MG/100ML
INJECTION, SOLUTION INTRAVENOUS CONTINUOUS PRN
Status: DISCONTINUED | OUTPATIENT
Start: 2024-08-08 | End: 2024-08-08 | Stop reason: SDUPTHER

## 2024-08-08 RX ORDER — ACETAMINOPHEN 500 MG
1000 TABLET ORAL ONCE
Status: COMPLETED | OUTPATIENT
Start: 2024-08-08 | End: 2024-08-08

## 2024-08-08 RX ORDER — MIDAZOLAM HYDROCHLORIDE 2 MG/2ML
2 INJECTION, SOLUTION INTRAMUSCULAR; INTRAVENOUS
Status: COMPLETED | OUTPATIENT
Start: 2024-08-08 | End: 2024-08-08

## 2024-08-08 RX ORDER — OXYCODONE HYDROCHLORIDE 5 MG/1
5 TABLET ORAL EVERY 4 HOURS PRN
Qty: 50 TABLET | Refills: 0 | Status: SHIPPED | OUTPATIENT
Start: 2024-08-08 | End: 2024-08-16

## 2024-08-08 RX ADMIN — EPHEDRINE SULFATE 10 MG: 5 INJECTION INTRAVENOUS at 14:51

## 2024-08-08 RX ADMIN — KETOROLAC TROMETHAMINE 15 MG: 30 INJECTION, SOLUTION INTRAMUSCULAR at 16:10

## 2024-08-08 RX ADMIN — FENTANYL CITRATE 100 MCG: 0.05 INJECTION, SOLUTION INTRAMUSCULAR; INTRAVENOUS at 13:41

## 2024-08-08 RX ADMIN — SODIUM CHLORIDE, SODIUM LACTATE, POTASSIUM CHLORIDE, AND CALCIUM CHLORIDE: 600; 310; 30; 20 INJECTION, SOLUTION INTRAVENOUS at 13:48

## 2024-08-08 RX ADMIN — TRANEXAMIC ACID 1950 MG: 650 TABLET ORAL at 10:41

## 2024-08-08 RX ADMIN — HYDROMORPHONE HYDROCHLORIDE 0.5 MG: 1 INJECTION, SOLUTION INTRAMUSCULAR; INTRAVENOUS; SUBCUTANEOUS at 15:21

## 2024-08-08 RX ADMIN — ONDANSETRON 4 MG: 2 INJECTION INTRAMUSCULAR; INTRAVENOUS at 14:56

## 2024-08-08 RX ADMIN — ROCURONIUM BROMIDE 80 MG: 10 INJECTION, SOLUTION INTRAVENOUS at 13:09

## 2024-08-08 RX ADMIN — ACETAMINOPHEN 1000 MG: 500 TABLET ORAL at 10:41

## 2024-08-08 RX ADMIN — OXYCODONE HYDROCHLORIDE 10 MG: 10 TABLET, FILM COATED, EXTENDED RELEASE ORAL at 10:41

## 2024-08-08 RX ADMIN — PHENYLEPHRINE HYDROCHLORIDE 100 MCG: 10 INJECTION INTRAVENOUS at 13:53

## 2024-08-08 RX ADMIN — EPHEDRINE SULFATE 5 MG: 5 INJECTION INTRAVENOUS at 14:33

## 2024-08-08 RX ADMIN — NITROGLYCERIN 50 MCG: 20 INJECTION INTRAVENOUS at 13:46

## 2024-08-08 RX ADMIN — LIDOCAINE HYDROCHLORIDE 50 MG: 10 INJECTION, SOLUTION INFILTRATION; PERINEURAL at 13:09

## 2024-08-08 RX ADMIN — EPHEDRINE SULFATE 5 MG: 5 INJECTION INTRAVENOUS at 13:50

## 2024-08-08 RX ADMIN — FENTANYL CITRATE 50 MCG: 0.05 INJECTION, SOLUTION INTRAMUSCULAR; INTRAVENOUS at 13:09

## 2024-08-08 RX ADMIN — PROPOFOL 120 MG: 10 INJECTION, EMULSION INTRAVENOUS at 13:09

## 2024-08-08 RX ADMIN — CELECOXIB 200 MG: 200 CAPSULE ORAL at 10:41

## 2024-08-08 RX ADMIN — EPHEDRINE SULFATE 5 MG: 5 INJECTION INTRAVENOUS at 14:07

## 2024-08-08 RX ADMIN — NITROGLYCERIN 50 MCG: 20 INJECTION INTRAVENOUS at 13:43

## 2024-08-08 RX ADMIN — ROPIVACAINE HYDROCHLORIDE 20 ML: 5 INJECTION, SOLUTION EPIDURAL; INFILTRATION; PERINEURAL at 12:42

## 2024-08-08 RX ADMIN — HYDROMORPHONE HYDROCHLORIDE 0.5 MG: 1 INJECTION, SOLUTION INTRAMUSCULAR; INTRAVENOUS; SUBCUTANEOUS at 15:45

## 2024-08-08 RX ADMIN — HYDROMORPHONE HYDROCHLORIDE 0.5 MG: 1 INJECTION, SOLUTION INTRAMUSCULAR; INTRAVENOUS; SUBCUTANEOUS at 15:55

## 2024-08-08 RX ADMIN — HYDROMORPHONE HYDROCHLORIDE 0.5 MG: 1 INJECTION, SOLUTION INTRAMUSCULAR; INTRAVENOUS; SUBCUTANEOUS at 15:31

## 2024-08-08 RX ADMIN — SUGAMMADEX 300 MG: 100 INJECTION, SOLUTION INTRAVENOUS at 14:56

## 2024-08-08 RX ADMIN — HYDROMORPHONE HYDROCHLORIDE 1 MG: 1 INJECTION, SOLUTION INTRAMUSCULAR; INTRAVENOUS; SUBCUTANEOUS at 13:45

## 2024-08-08 RX ADMIN — MIDAZOLAM 2 MG: 1 INJECTION INTRAMUSCULAR; INTRAVENOUS at 12:38

## 2024-08-08 RX ADMIN — EPHEDRINE SULFATE 10 MG: 5 INJECTION INTRAVENOUS at 13:53

## 2024-08-08 RX ADMIN — CEFAZOLIN 2 G: 1 INJECTION, POWDER, FOR SOLUTION INTRAMUSCULAR; INTRAVENOUS at 13:21

## 2024-08-08 RX ADMIN — SODIUM CHLORIDE, POTASSIUM CHLORIDE, SODIUM LACTATE AND CALCIUM CHLORIDE: 600; 310; 30; 20 INJECTION, SOLUTION INTRAVENOUS at 10:46

## 2024-08-08 RX ADMIN — Medication 1500 MG: at 11:31

## 2024-08-08 RX ADMIN — SODIUM CHLORIDE, SODIUM LACTATE, POTASSIUM CHLORIDE, AND CALCIUM CHLORIDE: 600; 310; 30; 20 INJECTION, SOLUTION INTRAVENOUS at 13:05

## 2024-08-08 ASSESSMENT — PAIN DESCRIPTION - ORIENTATION
ORIENTATION: RIGHT

## 2024-08-08 ASSESSMENT — PAIN SCALES - GENERAL
PAINLEVEL_OUTOF10: 8
PAINLEVEL_OUTOF10: 9
PAINLEVEL_OUTOF10: 6
PAINLEVEL_OUTOF10: 9
PAINLEVEL_OUTOF10: 7
PAINLEVEL_OUTOF10: 7

## 2024-08-08 ASSESSMENT — PAIN DESCRIPTION - LOCATION
LOCATION: KNEE

## 2024-08-08 ASSESSMENT — PAIN - FUNCTIONAL ASSESSMENT
PAIN_FUNCTIONAL_ASSESSMENT: ACTIVITIES ARE NOT PREVENTED
PAIN_FUNCTIONAL_ASSESSMENT: NONE - DENIES PAIN
PAIN_FUNCTIONAL_ASSESSMENT: NONE - DENIES PAIN
PAIN_FUNCTIONAL_ASSESSMENT: 0-10

## 2024-08-08 ASSESSMENT — LIFESTYLE VARIABLES: SMOKING_STATUS: 0

## 2024-08-08 ASSESSMENT — PAIN DESCRIPTION - DESCRIPTORS: DESCRIPTORS: ACHING;DISCOMFORT

## 2024-08-08 NOTE — PROGRESS NOTES
Patient to room 15. He is alert, oriented and able to make needs known. VSS. He states his pain is now improving. Pain is now noted to be \"5.\" Friend from [employer] at bedside. Sister lives next door to patient.

## 2024-08-08 NOTE — ANESTHESIA PRE PROCEDURE
Department of Anesthesiology  Preprocedure Note       Name:  Domenic Molina   Age:  68 y.o.  :  1955                                          MRN:  [id number]         Date:  2024      Surgeon: Surgeon(s):  Stevenson Olivares MD    Procedure: Procedure(s):  ROBOTIC RIGHT COMPLEX TOTAL KNEE ARTHROPLASTY    Medications prior to admission:   Prior to Admission medications    Medication Sig Start Date End Date Taking? Authorizing Provider   cyclobenzaprine (FLEXERIL) 10 MG tablet Take 1 tablet by mouth 3 times daily as needed for Muscle spasms    ProviderEsther MD   Multiple Vitamins-Minerals (THERAPEUTIC MULTIVITAMIN-MINERALS) tablet Take 1 tablet by mouth daily    Esther Quintana MD   hydroCHLOROthiazide 12.5 MG capsule Take 1 capsule by mouth daily Indications: High Blood Pressure Disorder    Esther Quintana MD   ferrous sulfate (IRON 325) 325 (65 Fe) MG tablet Take 1 tablet by mouth daily (with breakfast) Indications: Anemia From Inadequate Iron in the Body    Esther Quintana MD   acetaminophen (TYLENOL 8 HOUR ARTHRITIS PAIN) 650 MG extended release tablet Take 1 tablet by mouth 3 times daily    Esther Quintana MD   docusate sodium (COLACE) 100 MG capsule Take 1 capsule by mouth 2 times daily as needed for Constipation    Esther Quintana MD   Cyanocobalamin (B-12) 2500 MCG TABS Take 1 tablet by mouth Daily    Esther Quintana MD   methylcellulose (CITRUCEL) 500 MG TABS tablet Take 1 tablet by mouth 2 times daily    Esther Quintana MD   omeprazole (PRILOSEC) 10 MG capsule Take 2 capsules by mouth daily    Esther Quintana MD       Current medications:    Current Facility-Administered Medications   Medication Dose Route Frequency Provider Last Rate Last Admin   • lactated ringers IV soln infusion   IntraVENous Continuous Anup Pascual  mL/hr at 24 1046 New Bag at 24 1046   • ROPivacaine (NAROPIN) 0.5% injection 30 mL  30 mL

## 2024-08-08 NOTE — DISCHARGE INSTR - DIET
Good nutrition is important when healing from an illness, injury, or surgery.  Follow any nutrition recommendations given to you during your hospital stay.   If you were given an oral nutrition supplement while in the hospital, continue to take this supplement at home.  You can take it with meals, in-between meals, and/or before bedtime. These supplements can be purchased at most local grocery stores, pharmacies, and chain [employer]-stores.   If you have any questions about your diet or nutrition, call the hospital and ask for the dietitian.           Resume home diet

## 2024-08-08 NOTE — OP NOTE
TOTAL KNEE ARTHROPLASTY OPERATIVE NOTE    NAME OF SURGEON / : Valentin Botello MD  PATIENT:   Domenic Molina  Date: 8/8/2024        Time: 2:43 PM   Referring Physician: ________________________    PREOP DIAGNOSIS:  right Knee  Primary osteoarthritis   POSTOP DIAGNOSIS:  Same     PROCEDURE:    Right  Cemented Posterior Stabilized Knee arthroplasty, DOMENICO robot assisted    IMPLANTS:   Implant Name Type Inv. Item Serial No.  Lot No. LRB No. Used Action   CEMENT BONE 40GM HI VISC PALACOS R - [id number]  CEMENT BONE 40GM HI VISC PALACOS R  [employer]- [id number] Right 2 Implanted   COMPONENT FEM SZ 7 R KNEE CO CHROM YUNIER POST STBL SHARA - [id number]  COMPONENT FEM SZ 7 R KNEE CO CHROM YUNIER POST STBL SHARA  [employer]- [id number] Right 1 Implanted   PSN TIB STM 5 DEG SZ D R - [id number]  PSN TIB STM 5 DEG SZ D R  [employer]- [id number] Right 1 Implanted   EXTENSION STEM L30MM [id number] KNEE TAPR SHARA PERSONA - [id number]  EXTENSION STEM L30MM [id number] KNEE TAPR SHARA PERSONA  [employer]- [id number] Right 1 Implanted   COMPONENT PAT [id number] THK9MM KNEE POLY SHARA CONVENTIONAL - [id number]  COMPONENT PAT [id number] THK9MM KNEE POLY SHARA CONVENTIONAL  [employer]- [id number] Right 1 Implanted   SURFACE ARTC TIB CD FEM 6-9 H10MM RT KNEE VIVACIT-E CONSTRN - [id number]  SURFACE ARTC TIB CD FEM 6-9 H10MM RT KNEE VIVACIT-E CONSTRN  [employer]- [id number] Right 1 Implanted       FINDINGS:  Preop ROM:  Full except for   - 10 degrees  extension  Alignment:    varus  Bone quality:   normal  Cartilage wear:  Medial - severe  Lateral - mild  Pat-fem -moderate  ACL -Absent    Deviation from robotic plan:  used traditional femoral sizing technique    ASSISTANT: Constantin Jacobs, certified first assistant.  Helped with draping, exposure, retraction, essential steps of the procedure, and with wound closure.   ANESTHESIA:  General  EBL:  500 mL  TOURNIQUET:   in stable condition.            PLAN:  Full weight bearing, ROM, dvt prophylaxis        Electronically signed by IVIS VAZQUEZ MD on 8/8/2024 at 2:43 PM

## 2024-08-08 NOTE — DISCHARGE INSTRUCTIONS
[facility]  Dr. Valentin Botello      Total Knee and Uni Knee Replacement  Discharge Instructions     To prevent blood clots, you have been placed on the following medication:  Aspirin 81 mg twice a day for four weeks    Surgical Site Care:  Showering is permitted on post op day 2 - Saturday  No submersion in a bath, swimming pool, whirlpool, etc    Physical Therapy:  You were given a prescription for outpatient therapy.  Please schedule with [facility]  if convenient by calling 1 [phone]. Otherwise, schedule with a therapist closer to your home  Work on range of motion.    Goal for your first office visit is for you to fully straighten and bend your knee to at least a right angle   Don't walk for exercise (it will make you more sore)  Weight Bearing Status:  Full weight bearing with a walker     Pain Medications  You were given a prescription to fill at your pharmacy  Wean off pain medications as you deem appropriate as long as pain is under control  Take tylenol instead of the prescribed pain medicine as your pain improves    Cold packs                                                                                                               FEVER .5 or less        May be used as necessary                                                                    Take Tylenol x 2                                                                                                            Deep breath x 10   Please take a stool softener such as dulcolax or colace daily                                 Cough, cough, cough                                                                                                                        Recheck in 1 - 11/2                                                                                                                                            hours  Do not drive until surgeon releases you  DO NOT SMOKE, VAPE OR CHEW!!!    Contact office  if  Increased redness, swelling, drainage of any kind, and/or severe pain at surgery site.  As well as new onset fevers and or chills.  These could signify an infection.  Calf tenderness to touch as well as increased swelling or redness.  This could signify a clot.  Any rash appears, increased  or new onset nausea/vomiting occur.  This may indicate a reaction to a medication.     Phone # [phone]  ext [phone].  Leave message for my assistant.  She will promptly return your call.  If you have an absolute emergency, text me with your name and problem at .  (Dr. Olivares)  Or contact Ortho Navigator at 1 [phone].  (Gloria)    Follow up with Surgeon at scheduled appointment time.  Thanks for the opportunity to care for you!

## 2024-08-08 NOTE — PROGRESS NOTES
CLINICAL PHARMACY NOTE: MEDS TO BEDS    Total # of Prescriptions Filled: 3   The following medications were delivered to the patient:  Current Discharge Medication List        START taking these medications    Details   oxyCODONE (ROXICODONE) 5 MG immediate release tablet Take 1 tablet by mouth every 4 hours as needed for Pain for up to 8 days. Max Daily Amount: 30 mg  Qty: 50 tablet, Refills: 0    Comments: Reduce doses taken as pain becomes manageable Reduce doses taken as pain becomes manageable  Associated Diagnoses: Primary osteoarthritis of right knee      aspirin (ASPIRIN CHILDRENS) 81 MG chewable tablet Take 1 tablet by mouth in the morning and at bedtime  Qty: 60 tablet, Refills: 0      ibuprofen (ADVIL;MOTRIN) 400 MG tablet Take 1 tablet by mouth every 8 hours as needed for Pain  Qty: 30 tablet, Refills: 0               Additional Documentation:    Handed scripts to patients family at [facility]. Paid with card.    None known

## 2024-08-08 NOTE — ANESTHESIA POSTPROCEDURE EVALUATION
Department of Anesthesiology  Postprocedure Note    Patient: Domenic Molina  MRN: [id number]  YOB: 1955  Date of evaluation: 8/8/2024    Procedure Summary       Date: 08/08/24 Room / Location: [address]    Anesthesia Start: 1305 Anesthesia Stop: 1510    Procedure: ROBOTIC RIGHT COMPLEX TOTAL KNEE ARTHROPLASTY (Right: Knee) Diagnosis:       Primary osteoarthritis of right knee      (Primary osteoarthritis of right knee [M17.11])    Surgeons: Stevenson Olivares MD Responsible Provider: Theron Maradiaga APRN - CRNA    Anesthesia Type: general, regional ASA Status: 2            Anesthesia Type: No value filed.    Marianna Phase I: Marianna Score: 9    Marianna Phase II:      Anesthesia Post Evaluation    Patient location during evaluation: PACU  Patient participation: complete - patient participated  Level of consciousness: sleepy but conscious  Pain score: 0  Airway patency: patent  Nausea & Vomiting: no nausea and no vomiting  Cardiovascular status: blood pressure returned to baseline  Respiratory status: acceptable  Pain management: adequate        No notable events documented.

## 2024-08-12 ENCOUNTER — TELEPHONE (OUTPATIENT)
Dept: INPATIENT UNIT | Age: 69
End: 2024-08-12

## 2024-11-15 ENCOUNTER — OFFICE VISIT (OUTPATIENT)
Age: 69
End: 2024-11-15

## 2024-11-15 VITALS — BODY MASS INDEX: 27.53 KG/M2 | HEIGHT: 67 IN | WEIGHT: 175.4 LBS

## 2024-11-15 DIAGNOSIS — M51.370 DEGENERATION OF INTERVERTEBRAL DISC OF LUMBOSACRAL REGION WITH DISCOGENIC BACK PAIN: ICD-10-CM

## 2024-11-15 DIAGNOSIS — Z96.651 STATUS POST RIGHT KNEE REPLACEMENT: Primary | ICD-10-CM

## 2024-11-15 NOTE — PROGRESS NOTES
[facility]  [facility]  [address]  [place] [address] [phone]         Domenic Molina (: 1955) is a 69 y.o. male, patient, here for evaluation of the following chief complaint(s): No chief complaint on file.  .         Patient's PCP: Rl Molina MD     Patient's Last Appointment in this Department was on Visit date not found      Subjective:     No chief complaint on file.       The patient presents in follow-up after right knee arthroplasty.  Tells me he is doing well with the right knee.  The surgery has helped.  He has a little bit of pain in his right leg.  He also tells me he has chronic low back pain.  He recently helped one of his relatives with a yard sale and it lifting aggravate his lower back.  He had an injury to his back 30 years ago.  He does not see a back specialist.            Medications  Current Outpatient Medications   Medication Sig Dispense Refill    aspirin (ASPIRIN CHILDRENS) 81 MG chewable tablet Take 1 tablet by mouth in the morning and at bedtime 60 tablet 0    ibuprofen (ADVIL;MOTRIN) 400 MG tablet Take 1 tablet by mouth every 8 hours as needed for Pain 30 tablet 0    cyclobenzaprine (FLEXERIL) 10 MG tablet Take 1 tablet by mouth 3 times daily as needed for Muscle spasms      Multiple Vitamins-Minerals (THERAPEUTIC MULTIVITAMIN-MINERALS) tablet Take 1 tablet by mouth daily      hydroCHLOROthiazide 12.5 MG capsule Take 1 capsule by mouth daily Indications: High Blood Pressure Disorder      ferrous sulfate (IRON 325) 325 (65 Fe) MG tablet Take 1 tablet by mouth daily (with breakfast) Indications: Anemia From Inadequate Iron in the Body      acetaminophen (TYLENOL 8 HOUR ARTHRITIS PAIN) 650 MG extended release tablet Take 1 tablet by mouth 3 times daily      docusate sodium (COLACE) 100 MG capsule Take 1 capsule by mouth 2 times daily as needed for Constipation      Cyanocobalamin (B-12) 2500

## 2024-11-21 ENCOUNTER — TELEPHONE (OUTPATIENT)
Dept: NEUROSURGERY | Age: 69
End: 2024-11-21

## 2024-11-21 NOTE — TELEPHONE ENCOUNTER
1st attempt to contact patient. I left a voicemail instructing patient to call back at [phone] to schedule their new patient appointment     Degeneration of intervertebral disc of lumbosacral region with discogenic back pain

## 2025-05-29 ENCOUNTER — HOSPITAL ENCOUNTER (INPATIENT)
Age: 70
LOS: 1 days | Discharge: HOME | DRG: 379 | End: 2025-05-30
Attending: EMERGENCY MEDICINE | Admitting: INTERNAL MEDICINE
Payer: MEDICARE

## 2025-05-29 ENCOUNTER — APPOINTMENT (OUTPATIENT)
Dept: CT IMAGING | Age: 70
DRG: 379 | End: 2025-05-29
Payer: MEDICARE

## 2025-05-29 DIAGNOSIS — Z87.19 HISTORY OF DIVERTICULOSIS: ICD-10-CM

## 2025-05-29 DIAGNOSIS — K92.1 HEMATOCHEZIA: Primary | ICD-10-CM

## 2025-05-29 PROBLEM — K62.5 RECTAL BLEEDING: Status: ACTIVE | Noted: 2025-05-29

## 2025-05-29 LAB
ABO/RH: NORMAL
ALBUMIN SERPL-MCNC: 3.8 G/DL (ref 3.5–5.2)
ALP SERPL-CCNC: 81 U/L (ref 40–129)
ALT SERPL-CCNC: 14 U/L (ref 10–50)
ANION GAP SERPL CALCULATED.3IONS-SCNC: 12 MMOL/L (ref 8–16)
ANTIBODY SCREEN: NORMAL
AST SERPL-CCNC: 21 U/L (ref 10–50)
BASOPHILS # BLD: 0 K/UL (ref 0–0.2)
BASOPHILS NFR BLD: 0.4 % (ref 0–1)
BILIRUB SERPL-MCNC: 0.4 MG/DL (ref 0.2–1.2)
BUN SERPL-MCNC: 23 MG/DL (ref 8–23)
CALCIUM SERPL-MCNC: 8.9 MG/DL (ref 8.8–10.2)
CHLORIDE SERPL-SCNC: 104 MMOL/L (ref 98–107)
CO2 SERPL-SCNC: 23 MMOL/L (ref 22–29)
CREAT SERPL-MCNC: 1.1 MG/DL (ref 0.7–1.2)
EOSINOPHIL # BLD: 0.1 K/UL (ref 0–0.6)
EOSINOPHIL NFR BLD: 1.2 % (ref 0–5)
ERYTHROCYTE [DISTWIDTH] IN BLOOD BY AUTOMATED COUNT: 14.6 % (ref 11.5–14.5)
GLUCOSE SERPL-MCNC: 127 MG/DL (ref 70–99)
HCT VFR BLD AUTO: 31.9 % (ref 42–52)
HCT VFR BLD AUTO: 33.4 % (ref 42–52)
HGB BLD-MCNC: 10.2 G/DL (ref 14–18)
HGB BLD-MCNC: 10.7 G/DL (ref 14–18)
IMM GRANULOCYTES # BLD: 0 K/UL
INR PPP: 1.09 (ref 0.88–1.18)
LYMPHOCYTES # BLD: 0.5 K/UL (ref 1.1–4.5)
LYMPHOCYTES NFR BLD: 6 % (ref 20–40)
MCH RBC QN AUTO: 30 PG (ref 27–31)
MCHC RBC AUTO-ENTMCNC: 32 G/DL (ref 33–37)
MCV RBC AUTO: 93.6 FL (ref 80–94)
MONOCYTES # BLD: 0.5 K/UL (ref 0–0.9)
MONOCYTES NFR BLD: 6.4 % (ref 0–10)
NEUTROPHILS # BLD: 6.5 K/UL (ref 1.5–7.5)
NEUTS SEG NFR BLD: 85.5 % (ref 50–65)
PLATELET # BLD AUTO: 270 K/UL (ref 130–400)
PMV BLD AUTO: 9.4 FL (ref 9.4–12.4)
POTASSIUM SERPL-SCNC: 4.3 MMOL/L (ref 3.5–5.1)
PROT SERPL-MCNC: 6.1 G/DL (ref 6.4–8.3)
PROTHROMBIN TIME: 14.1 SEC (ref 12–14.6)
RBC # BLD AUTO: 3.57 M/UL (ref 4.7–6.1)
SODIUM SERPL-SCNC: 139 MMOL/L (ref 136–145)
WBC # BLD AUTO: 7.7 K/UL (ref 4.8–10.8)

## 2025-05-29 PROCEDURE — 94760 N-INVAS EAR/PLS OXIMETRY 1: CPT

## 2025-05-29 PROCEDURE — G0378 HOSPITAL OBSERVATION PER HR: HCPCS

## 2025-05-29 PROCEDURE — 1200000000 HC SEMI PRIVATE

## 2025-05-29 PROCEDURE — 96374 THER/PROPH/DIAG INJ IV PUSH: CPT

## 2025-05-29 PROCEDURE — 85025 COMPLETE CBC W/AUTO DIFF WBC: CPT

## 2025-05-29 PROCEDURE — 80053 COMPREHEN METABOLIC PANEL: CPT

## 2025-05-29 PROCEDURE — 85610 PROTHROMBIN TIME: CPT

## 2025-05-29 PROCEDURE — 6360000002 HC RX W HCPCS

## 2025-05-29 PROCEDURE — 6360000004 HC RX CONTRAST MEDICATION

## 2025-05-29 PROCEDURE — 86850 RBC ANTIBODY SCREEN: CPT

## 2025-05-29 PROCEDURE — 85014 HEMATOCRIT: CPT

## 2025-05-29 PROCEDURE — 85018 HEMOGLOBIN: CPT

## 2025-05-29 PROCEDURE — 86900 BLOOD TYPING SEROLOGIC ABO: CPT

## 2025-05-29 PROCEDURE — 96361 HYDRATE IV INFUSION ADD-ON: CPT

## 2025-05-29 PROCEDURE — 6370000000 HC RX 637 (ALT 250 FOR IP)

## 2025-05-29 PROCEDURE — 36415 COLL VENOUS BLD VENIPUNCTURE: CPT

## 2025-05-29 PROCEDURE — 99285 EMERGENCY DEPT VISIT HI MDM: CPT

## 2025-05-29 PROCEDURE — 86901 BLOOD TYPING SEROLOGIC RH(D): CPT

## 2025-05-29 PROCEDURE — 2580000003 HC RX 258

## 2025-05-29 PROCEDURE — 74177 CT ABD & PELVIS W/CONTRAST: CPT

## 2025-05-29 RX ORDER — ONDANSETRON 4 MG/1
4 TABLET, ORALLY DISINTEGRATING ORAL EVERY 8 HOURS PRN
Status: DISCONTINUED | OUTPATIENT
Start: 2025-05-29 | End: 2025-05-30 | Stop reason: HOSPADM

## 2025-05-29 RX ORDER — ONDANSETRON 2 MG/ML
4 INJECTION INTRAMUSCULAR; INTRAVENOUS EVERY 6 HOURS PRN
Status: DISCONTINUED | OUTPATIENT
Start: 2025-05-29 | End: 2025-05-30 | Stop reason: HOSPADM

## 2025-05-29 RX ORDER — MELOXICAM 7.5 MG/1
7.5 TABLET ORAL 2 TIMES DAILY
Status: ON HOLD | COMMUNITY
End: 2025-05-30 | Stop reason: HOSPADM

## 2025-05-29 RX ORDER — CELECOXIB 200 MG/1
200 CAPSULE ORAL DAILY
Status: ON HOLD | COMMUNITY
End: 2025-05-30 | Stop reason: HOSPADM

## 2025-05-29 RX ORDER — GABAPENTIN 300 MG/1
300 CAPSULE ORAL 3 TIMES DAILY PRN
Status: DISCONTINUED | OUTPATIENT
Start: 2025-05-29 | End: 2025-05-30 | Stop reason: HOSPADM

## 2025-05-29 RX ORDER — GABAPENTIN 300 MG/1
300 CAPSULE ORAL 3 TIMES DAILY PRN
COMMUNITY

## 2025-05-29 RX ORDER — CETIRIZINE HYDROCHLORIDE 10 MG/1
10 TABLET ORAL DAILY
Status: DISCONTINUED | OUTPATIENT
Start: 2025-05-29 | End: 2025-05-30 | Stop reason: HOSPADM

## 2025-05-29 RX ORDER — SODIUM CHLORIDE 9 MG/ML
INJECTION, SOLUTION INTRAVENOUS CONTINUOUS
Status: DISCONTINUED | OUTPATIENT
Start: 2025-05-29 | End: 2025-05-30 | Stop reason: HOSPADM

## 2025-05-29 RX ORDER — SODIUM CHLORIDE 0.9 % (FLUSH) 0.9 %
5-40 SYRINGE (ML) INJECTION EVERY 12 HOURS SCHEDULED
Status: DISCONTINUED | OUTPATIENT
Start: 2025-05-29 | End: 2025-05-30 | Stop reason: HOSPADM

## 2025-05-29 RX ORDER — IOPAMIDOL 755 MG/ML
75 INJECTION, SOLUTION INTRAVASCULAR
Status: COMPLETED | OUTPATIENT
Start: 2025-05-29 | End: 2025-05-29

## 2025-05-29 RX ORDER — CETIRIZINE HYDROCHLORIDE 10 MG/1
10 TABLET ORAL DAILY
COMMUNITY

## 2025-05-29 RX ORDER — ACETAMINOPHEN 325 MG/1
650 TABLET ORAL EVERY 6 HOURS PRN
Status: DISCONTINUED | OUTPATIENT
Start: 2025-05-29 | End: 2025-05-30 | Stop reason: HOSPADM

## 2025-05-29 RX ORDER — CELECOXIB 200 MG/1
200 CAPSULE ORAL DAILY
Status: DISCONTINUED | OUTPATIENT
Start: 2025-05-30 | End: 2025-05-30 | Stop reason: HOSPADM

## 2025-05-29 RX ORDER — SODIUM CHLORIDE 0.9 % (FLUSH) 0.9 %
5-40 SYRINGE (ML) INJECTION PRN
Status: DISCONTINUED | OUTPATIENT
Start: 2025-05-29 | End: 2025-05-30 | Stop reason: HOSPADM

## 2025-05-29 RX ORDER — ACETAMINOPHEN 650 MG/1
650 SUPPOSITORY RECTAL EVERY 6 HOURS PRN
Status: DISCONTINUED | OUTPATIENT
Start: 2025-05-29 | End: 2025-05-30 | Stop reason: HOSPADM

## 2025-05-29 RX ORDER — SODIUM CHLORIDE 9 MG/ML
INJECTION, SOLUTION INTRAVENOUS PRN
Status: DISCONTINUED | OUTPATIENT
Start: 2025-05-29 | End: 2025-05-30 | Stop reason: HOSPADM

## 2025-05-29 RX ADMIN — IOPAMIDOL 75 ML: 755 INJECTION, SOLUTION INTRAVENOUS at 17:49

## 2025-05-29 RX ADMIN — CETIRIZINE HYDROCHLORIDE 10 MG: 10 TABLET, FILM COATED ORAL at 22:47

## 2025-05-29 RX ADMIN — TIZANIDINE 4 MG: 4 TABLET ORAL at 22:47

## 2025-05-29 RX ADMIN — GABAPENTIN 300 MG: 300 CAPSULE ORAL at 22:47

## 2025-05-29 RX ADMIN — SODIUM CHLORIDE: 0.9 INJECTION, SOLUTION INTRAVENOUS at 18:03

## 2025-05-29 RX ADMIN — SODIUM CHLORIDE, PRESERVATIVE FREE 40 MG: 5 INJECTION INTRAVENOUS at 18:07

## 2025-05-29 ASSESSMENT — ENCOUNTER SYMPTOMS
SHORTNESS OF BREATH: 0
WHEEZING: 0
CONSTIPATION: 0
ABDOMINAL PAIN: 0
VOMITING: 0
DIARRHEA: 0
EYES NEGATIVE: 1
NAUSEA: 0
COLOR CHANGE: 0
BLOOD IN STOOL: 1
COUGH: 0
RESPIRATORY NEGATIVE: 1

## 2025-05-29 ASSESSMENT — LIFESTYLE VARIABLES
HOW OFTEN DO YOU HAVE A DRINK CONTAINING ALCOHOL: NEVER
HOW MANY STANDARD DRINKS CONTAINING ALCOHOL DO YOU HAVE ON A TYPICAL DAY: PATIENT DOES NOT DRINK

## 2025-05-29 ASSESSMENT — PAIN SCALES - GENERAL: PAINLEVEL_OUTOF10: 3

## 2025-05-29 NOTE — H&P
[facility]      Hospitalist - History & Physical      PCP: Rl Molina MD    Date of Admission: 5/29/2025    Date of Service: 5/29/2025    Chief Complaint:  Rectal bleeding    History Of Present Illness:   The patient is a 69 y.o. male with hypertension and history of diverticulitis with diverticular bleeds comes to ED complaining of rectal bleeding.  Patient reports a total of 4 bloody bowel movements this morning.  He said dates that his stools were dark without bright red blood.  Patient does have history of diverticulitis with prior diverticular bleeds and states that this feels similar.  Patient denies any current abdominal pain, fatigue, shortness of breath, chest pain, dizziness.      In ED: WBC 7.7, H&H 10.7/33.4, CMP unremarkable.  Patient will be admitted inpatient to hospitalist with consult to GI.    Past Medical History:        Diagnosis Date    Arthritis     COVID-19 2024    mild case    History of blood transfusion     Hypertension     Iron deficiency anemia     Knee pain        Past Surgical History:        Procedure Laterality Date    JOINT MANIPULATION N/A 09/06/2016    KNEE MANIPULATION performed by London Shelton MD at [facility] OR    JOINT REPLACEMENT Left 2016    knee    TONSILLECTOMY      TOTAL KNEE ARTHROPLASTY Right 8/8/2024    ROBOTIC RIGHT COMPLEX TOTAL KNEE ARTHROPLASTY performed by Stevenson Olivares MD at [facility] OR       Home Medications:  Prior to Admission medications    Medication Sig Start Date End Date Taking? Authorizing Provider   aspirin (ASPIRIN CHILDRENS) 81 MG chewable tablet Take 1 tablet by mouth in the morning and at bedtime 8/8/24   Stevenson Olivares MD   ibuprofen (ADVIL;MOTRIN) 400 MG tablet Take 1 tablet by mouth every 8 hours as needed for Pain 8/8/24   Stevenson Olivares MD   cyclobenzaprine (FLEXERIL) 10 MG tablet Take 1 tablet by mouth 3 times daily as needed for Muscle spasms    Provider, MD Esther   Multiple Vitamins-Minerals  Problems:    * No resolved hospital problems. *     Principal Problem:    Rectal bleeding  - Admit to med/surg  - Consult to GI  - CT abd/pelvis W contrast given history of prior diverticular bleeds  - IVF NS @ 150 cc/hr  - No NSAIDS   - H/H Q6H  - NPO except ice chips  - Protonix BID  - BMP w/ reflex to mag daily  - Iron and TIBC, PT-INR, PTT in AM  - SCD's  - Strict I/O's   - Nursing to document all stools color/consistency  - Daily weights   - Telemetry    Resolved Problems:    * No resolved hospital problems. *    DVT prophylaxis: SCD's  GI prophylaxis: Protonix gtt    Signed:  RICKY Santana - CNP, 5/29/2025 2:47 PM      EMR Dragon/Transcription disclaimer:   Much of this encounter note is an electronic transcription/translation of spoken language to printed text. The electronic translation of spoken language may permit erroneous, or at times, nonsensical words or phrases to be inadvertently transcribed; although attempts have made to review the note for such errors, some may still exist.

## 2025-05-29 NOTE — PROGRESS NOTES
Domenic Molina arrived to room # 312.   Presented with: Rectal bleeding  Mental Status: Patient is oriented, alert, coherent, logical, thought processes intact, and able to concentrate and follow conversation.   Vitals:    05/29/25 1715   BP: 128/89   Pulse: 94   Resp: 16   Temp: 97.9 °F (36.6 °C)   SpO2: 99%     Patient safety contract and falls prevention contract reviewed with patient Yes.  Oriented Patient to room.  Call light within reach. Yes.  Needs, issues or concerns expressed at this time: no.      Electronically signed by Genie Garcia RN on 5/29/2025 at 6:44 PM

## 2025-05-29 NOTE — PROGRESS NOTES
4 Eyes Skin Assessment     NAME:  Domenic Molina  YOB: 1955  MEDICAL RECORD NUMBER:  [id number]    The patient is being assessed for  Admission    I agree that at least one RN has performed a thorough Head to Toe Skin Assessment on the patient. ALL assessment sites listed below have been assessed.      Areas assessed by both nurses:    Head, Face, Ears, Shoulders, Back, Chest, Arms, Elbows, Hands, Sacrum. Buttock, Coccyx, Ischium, Legs. Feet and Heels, and Under Medical Devices         Does the Patient have a Wound? No noted wound(s)       Jose Prevention initiated by RN: No  Wound Care Orders initiated by RN: No    Pressure Injury (Stage 3,4, Unstageable, DTI, NWPT, and Complex wounds) if present, place Wound referral order by RN under : No    New Ostomies, if present place, Ostomy referral order under : No     Nurse 1 eSignature: Electronically signed by Genie Garica RN on 5/29/25 at 6:46 PM CDT    **SHARE this note so that the co-signing nurse can place an eSignature**    Nurse 2 eSignature: Electronically signed by Esha Stuart RN on 5/30/25 at 6:32 AM CDT

## 2025-05-29 NOTE — ED PROVIDER NOTES
exam and include    at least one of the following: Automated exposure control, adjustment of the mA and/or kV according to size, and the use of iterative reconstruction technique.        ______________________________________    Electronically signed by: ASHLEY BERNAL M.D.   Date:     05/29/2025   Time:    18:02             ED BEDSIDE ULTRASOUND:   Performed by ED Physician - none    LABS:  Labs Reviewed   CBC WITH AUTO DIFFERENTIAL - Abnormal; Notable for the following components:       Result Value    RBC 3.57 (*)     Hemoglobin 10.7 (*)     Hematocrit 33.4 (*)     MCHC 32.0 (*)     RDW 14.6 (*)     Neutrophils % 85.5 (*)     Lymphocytes % 6.0 (*)     Lymphocytes Absolute 0.5 (*)     All other components within normal limits   COMPREHENSIVE METABOLIC PANEL - Abnormal; Notable for the following components:    Glucose 127 (*)     Total Protein 6.1 (*)     All other components within normal limits   PROTIME-INR   HEMOGLOBIN AND HEMATOCRIT   HEMOGLOBIN AND HEMATOCRIT   PROTIME-INR   APTT   IRON AND TIBC   BASIC METABOLIC PANEL W/ REFLEX TO MG FOR LOW K   TYPE AND SCREEN       All other labs were within normal range or not returned as of this dictation.    Medications   sodium chloride flush 0.9 % injection 5-40 mL ( IntraVENous Not Given 5/29/25 1934)   sodium chloride flush 0.9 % injection 5-40 mL (has no administration in time range)   0.9 % sodium chloride infusion (has no administration in time range)   ondansetron (ZOFRAN-ODT) disintegrating tablet 4 mg (has no administration in time range)     Or   ondansetron (ZOFRAN) injection 4 mg (has no administration in time range)   acetaminophen (TYLENOL) tablet 650 mg (has no administration in time range)     Or   acetaminophen (TYLENOL) suppository 650 mg (has no administration in time range)   0.9 % sodium chloride infusion ( IntraVENous New Bag 5/29/25 4373)   pantoprazole (PROTONIX) 40 mg in sodium chloride (PF) 0.9 % 10 mL injection (40 mg IntraVENous Given  5/29/25 1807)   gabapentin (NEURONTIN) capsule 300 mg (has no administration in time range)   celecoxib (CELEBREX) capsule 200 mg (has no administration in time range)   cetirizine (ZYRTEC) tablet 10 mg (has no administration in time range)   tiZANidine (ZANAFLEX) tablet 4 mg (has no administration in time range)   iopamidol (ISOVUE-370) 76 % injection 75 mL (75 mLs IntraVENous Given 5/29/25 1749)       EMERGENCY DEPARTMENT COURSE and DIFFERENTIALDIAGNOSIS/MDM:   Vitals:    Vitals:    05/29/25 1631 05/29/25 1715 05/29/25 1958 05/29/25 2004   BP: 117/80 128/89 (!) 122/90    Pulse: 73 94 79    Resp: 18 16 16    Temp:  97.9 °F (36.6 °C) 96.8 °F (36 °C)    TempSrc:  Oral Temporal    SpO2: 96% 99% 100% 100%   Weight:             PROCEDURES:  Unless otherwise notedbelow, none  Procedures    EKG: All EKG's are interpreted by the Emergency Department Physician who either signs or Co-signs this chart in the absence of a cardiologist.      MDM      ED Course as of 05/29/25 2017   Thu May 29, 2025   1337 Hemoglobin Quant(!): 10.7 [DYLAN]   1337 Hematocrit(!): 33.4 [DYLAN]      ED Course User Index  [DYLAN] Mick Fierro Jr., MD       Hypotensive on arrival here.  Labs show slight drop in hemoglobin compared to last values for comparison.  Blood pressure improved spontaneously.  No significant acute electrolyte or metabolic derangements on chemistry.  Abdominal exam is benign.  Reviewed previous medical records available.  Plan for admission to medicine service for further monitoring including trending hemoglobin and GI consult    Based on the evaluation and work-up here patient is felt to require further monitoring, work-up, or treatment that is available in the emergency department.  Case was discussed with hospitalist who agrees for observation or admission for further management.  Treatment and stabilization as necessary were provided in the emergency department prior to transfer of care to the medicine service.      CONSULTS:  IP  CONSULT TO GI  IP CONSULT TO GI        FINAL IMPRESSION     1. Hematochezia    2. History of diverticulosis          DISPOSITION/PLAN   DISPOSITION Admitted 05/29/2025 02:55:01 PM               No notes of EC Admission Criteria type on file.    PATIENT REFERRED TO:  No follow-up provider specified.    DISCHARGE MEDICATIONS:  Current Discharge Medication List             (Please note that portions of this note were completed with a voice recognition program.  Efforts were made to edit the dictations butoccasionally words are mis-transcribed.)    Mick Fierro Jr, MD (electronically signed)  AttendingEmergency Physician          Mick Fierro Jr., MD  05/29/25 2017

## 2025-05-29 NOTE — ED NOTES
ED TO INPATIENT SBAR HANDOFF    Patient Name: Domenic Molina   : 1955  69 y.o.   Family/Caregiver Present: No  Code Status Order: No Order    C-SSRS: Risk of Suicide: No Risk  Sitter No  Restraints:         Situation  Chief Complaint:   Chief Complaint   Patient presents with    Rectal Bleeding     Dark red, started this am     Patient Diagnosis: Rectal bleeding [K62.5]     Brief Description of Patient's Condition: Patient arrived to ED today with complaints of bloody stools. He reports that he had several episodes of bloody stools today. He denies any abdominal pain vomiting. He is calm and cooperative. He has not had any bloody stools while in ED today. He is independent at baseline.     Mental Status: alert, coherent, logical, thought processes intact, and able to concentrate and follow conversation  Arrived from: home    Imaging:   CT ABDOMEN PELVIS W IV CONTRAST Additional Contrast? None    (Results Pending)     COVID-19 Results:   Internal Administration   First Dose COVID-19, PFIZER PURPLE top, DILUTE for use, (age 12 y+), 30mcg/0.3mL  2021   Second Dose COVID-19, PFIZER PURPLE top, DILUTE for use, (age 12 y+), 30mcg/0.3mL   2021       Last COVID Lab No results found for: \"SARS-COV-2\"        Abnormal labs:   Abnormal Labs Reviewed   CBC WITH AUTO DIFFERENTIAL - Abnormal; Notable for the following components:       Result Value    RBC 3.57 (*)     Hemoglobin 10.7 (*)     Hematocrit 33.4 (*)     MCHC 32.0 (*)     RDW 14.6 (*)     Neutrophils % 85.5 (*)     Lymphocytes % 6.0 (*)     Lymphocytes Absolute 0.5 (*)     All other components within normal limits   COMPREHENSIVE METABOLIC PANEL - Abnormal; Notable for the following components:    Glucose 127 (*)     Total Protein 6.1 (*)     All other components within normal limits     Background  Allergies: No Known Allergies  Current Medications:     History:   Past Medical History:   Diagnosis Date    Arthritis     COVID-2024    mild case

## 2025-05-30 ENCOUNTER — ANCILLARY PROCEDURE (OUTPATIENT)
Dept: ENDOSCOPY | Age: 70
DRG: 379 | End: 2025-05-30
Attending: INTERNAL MEDICINE
Payer: MEDICARE

## 2025-05-30 ENCOUNTER — ANESTHESIA EVENT (OUTPATIENT)
Dept: ENDOSCOPY | Age: 70
End: 2025-05-30
Payer: MEDICARE

## 2025-05-30 ENCOUNTER — ANESTHESIA (OUTPATIENT)
Dept: ENDOSCOPY | Age: 70
End: 2025-05-30
Payer: MEDICARE

## 2025-05-30 VITALS
WEIGHT: 175 LBS | HEART RATE: 71 BPM | BODY MASS INDEX: 27.47 KG/M2 | RESPIRATION RATE: 22 BRPM | HEIGHT: 67 IN | SYSTOLIC BLOOD PRESSURE: 101 MMHG | OXYGEN SATURATION: 92 % | DIASTOLIC BLOOD PRESSURE: 60 MMHG | TEMPERATURE: 97.2 F

## 2025-05-30 PROBLEM — K92.1 HEMATOCHEZIA: Status: RESOLVED | Noted: 2025-05-30 | Resolved: 2025-05-30

## 2025-05-30 PROBLEM — K92.2 GI BLEED: Status: ACTIVE | Noted: 2025-05-29

## 2025-05-30 PROBLEM — K62.5 RECTAL BLEEDING: Status: RESOLVED | Noted: 2025-05-29 | Resolved: 2025-05-30

## 2025-05-30 PROBLEM — K92.1 HEMATOCHEZIA: Status: ACTIVE | Noted: 2025-05-30

## 2025-05-30 LAB
ANION GAP SERPL CALCULATED.3IONS-SCNC: 7 MMOL/L (ref 8–16)
APTT PPP: 27.6 SEC (ref 26–36.2)
BUN SERPL-MCNC: 29 MG/DL (ref 8–23)
CALCIUM SERPL-MCNC: 8.3 MG/DL (ref 8.8–10.2)
CHLORIDE SERPL-SCNC: 109 MMOL/L (ref 98–107)
CO2 SERPL-SCNC: 24 MMOL/L (ref 22–29)
CREAT SERPL-MCNC: 1.2 MG/DL (ref 0.7–1.2)
GLUCOSE SERPL-MCNC: 118 MG/DL (ref 70–99)
HCT VFR BLD AUTO: 27.2 % (ref 42–52)
HCT VFR BLD AUTO: 27.9 % (ref 42–52)
HGB BLD-MCNC: 8.5 G/DL (ref 14–18)
HGB BLD-MCNC: 8.6 G/DL (ref 14–18)
INR PPP: 1.17 (ref 0.88–1.18)
IRON SATN MFR SERPL: 32 % (ref 20–50)
IRON SERPL-MCNC: 66 UG/DL (ref 59–158)
POTASSIUM SERPL-SCNC: 5.1 MMOL/L (ref 3.5–5)
PROTHROMBIN TIME: 14.9 SEC (ref 12–14.6)
SODIUM SERPL-SCNC: 140 MMOL/L (ref 136–145)
TIBC SERPL-MCNC: 205 UG/DL (ref 250–400)

## 2025-05-30 PROCEDURE — 36415 COLL VENOUS BLD VENIPUNCTURE: CPT

## 2025-05-30 PROCEDURE — 85014 HEMATOCRIT: CPT

## 2025-05-30 PROCEDURE — 83540 ASSAY OF IRON: CPT

## 2025-05-30 PROCEDURE — 2709999900 HC NON-CHARGEABLE SUPPLY: Performed by: INTERNAL MEDICINE

## 2025-05-30 PROCEDURE — 43235 EGD DIAGNOSTIC BRUSH WASH: CPT | Performed by: INTERNAL MEDICINE

## 2025-05-30 PROCEDURE — 6360000002 HC RX W HCPCS

## 2025-05-30 PROCEDURE — 3700000001 HC ADD 15 MINUTES (ANESTHESIA): Performed by: INTERNAL MEDICINE

## 2025-05-30 PROCEDURE — 85730 THROMBOPLASTIN TIME PARTIAL: CPT

## 2025-05-30 PROCEDURE — 85018 HEMOGLOBIN: CPT

## 2025-05-30 PROCEDURE — 96376 TX/PRO/DX INJ SAME DRUG ADON: CPT

## 2025-05-30 PROCEDURE — 2580000003 HC RX 258: Performed by: NURSE ANESTHETIST, CERTIFIED REGISTERED

## 2025-05-30 PROCEDURE — 99222 1ST HOSP IP/OBS MODERATE 55: CPT | Performed by: INTERNAL MEDICINE

## 2025-05-30 PROCEDURE — G0378 HOSPITAL OBSERVATION PER HR: HCPCS

## 2025-05-30 PROCEDURE — 3700000000 HC ANESTHESIA ATTENDED CARE: Performed by: INTERNAL MEDICINE

## 2025-05-30 PROCEDURE — 0DJ08ZZ INSPECTION OF UPPER INTESTINAL TRACT, VIA NATURAL OR ARTIFICIAL OPENING ENDOSCOPIC: ICD-10-PCS | Performed by: INTERNAL MEDICINE

## 2025-05-30 PROCEDURE — 7100000001 HC PACU RECOVERY - ADDTL 15 MIN: Performed by: INTERNAL MEDICINE

## 2025-05-30 PROCEDURE — 85610 PROTHROMBIN TIME: CPT

## 2025-05-30 PROCEDURE — 3609017100 HC EGD: Performed by: INTERNAL MEDICINE

## 2025-05-30 PROCEDURE — 2580000003 HC RX 258

## 2025-05-30 PROCEDURE — 94760 N-INVAS EAR/PLS OXIMETRY 1: CPT

## 2025-05-30 PROCEDURE — 6360000002 HC RX W HCPCS: Performed by: NURSE ANESTHETIST, CERTIFIED REGISTERED

## 2025-05-30 PROCEDURE — 7100000000 HC PACU RECOVERY - FIRST 15 MIN: Performed by: INTERNAL MEDICINE

## 2025-05-30 PROCEDURE — 80048 BASIC METABOLIC PNL TOTAL CA: CPT

## 2025-05-30 PROCEDURE — 96361 HYDRATE IV INFUSION ADD-ON: CPT

## 2025-05-30 PROCEDURE — 83550 IRON BINDING TEST: CPT

## 2025-05-30 RX ORDER — SODIUM CHLORIDE 0.9 % (FLUSH) 0.9 %
5-40 SYRINGE (ML) INJECTION PRN
Status: DISCONTINUED | OUTPATIENT
Start: 2025-05-30 | End: 2025-05-30

## 2025-05-30 RX ORDER — LIDOCAINE HYDROCHLORIDE 10 MG/ML
INJECTION, SOLUTION INFILTRATION; PERINEURAL
Status: DISCONTINUED | OUTPATIENT
Start: 2025-05-30 | End: 2025-05-30 | Stop reason: SDUPTHER

## 2025-05-30 RX ORDER — DIPHENHYDRAMINE HYDROCHLORIDE 50 MG/ML
12.5 INJECTION, SOLUTION INTRAMUSCULAR; INTRAVENOUS
Status: DISCONTINUED | OUTPATIENT
Start: 2025-05-30 | End: 2025-05-30

## 2025-05-30 RX ORDER — SODIUM CHLORIDE, SODIUM LACTATE, POTASSIUM CHLORIDE, CALCIUM CHLORIDE 600; 310; 30; 20 MG/100ML; MG/100ML; MG/100ML; MG/100ML
INJECTION, SOLUTION INTRAVENOUS
Status: DISCONTINUED | OUTPATIENT
Start: 2025-05-30 | End: 2025-05-30 | Stop reason: SDUPTHER

## 2025-05-30 RX ORDER — SODIUM CHLORIDE 0.9 % (FLUSH) 0.9 %
5-40 SYRINGE (ML) INJECTION EVERY 12 HOURS SCHEDULED
Status: DISCONTINUED | OUTPATIENT
Start: 2025-05-30 | End: 2025-05-30

## 2025-05-30 RX ORDER — PROPOFOL 10 MG/ML
INJECTION, EMULSION INTRAVENOUS
Status: DISCONTINUED | OUTPATIENT
Start: 2025-05-30 | End: 2025-05-30 | Stop reason: SDUPTHER

## 2025-05-30 RX ORDER — SODIUM CHLORIDE 9 MG/ML
INJECTION, SOLUTION INTRAVENOUS PRN
Status: DISCONTINUED | OUTPATIENT
Start: 2025-05-30 | End: 2025-05-30

## 2025-05-30 RX ORDER — METOCLOPRAMIDE HYDROCHLORIDE 5 MG/ML
10 INJECTION INTRAMUSCULAR; INTRAVENOUS
Status: DISCONTINUED | OUTPATIENT
Start: 2025-05-30 | End: 2025-05-30

## 2025-05-30 RX ORDER — NALOXONE HYDROCHLORIDE 0.4 MG/ML
INJECTION, SOLUTION INTRAMUSCULAR; INTRAVENOUS; SUBCUTANEOUS PRN
Status: DISCONTINUED | OUTPATIENT
Start: 2025-05-30 | End: 2025-05-30

## 2025-05-30 RX ORDER — HYDROMORPHONE HYDROCHLORIDE 1 MG/ML
0.25 INJECTION, SOLUTION INTRAMUSCULAR; INTRAVENOUS; SUBCUTANEOUS EVERY 5 MIN PRN
Status: DISCONTINUED | OUTPATIENT
Start: 2025-05-30 | End: 2025-05-30

## 2025-05-30 RX ORDER — MEPERIDINE HYDROCHLORIDE 25 MG/ML
12.5 INJECTION INTRAMUSCULAR; INTRAVENOUS; SUBCUTANEOUS EVERY 5 MIN PRN
Status: DISCONTINUED | OUTPATIENT
Start: 2025-05-30 | End: 2025-05-30

## 2025-05-30 RX ORDER — HYDROMORPHONE HYDROCHLORIDE 1 MG/ML
0.5 INJECTION, SOLUTION INTRAMUSCULAR; INTRAVENOUS; SUBCUTANEOUS EVERY 5 MIN PRN
Status: DISCONTINUED | OUTPATIENT
Start: 2025-05-30 | End: 2025-05-30

## 2025-05-30 RX ADMIN — PROPOFOL 150 MG: 10 INJECTION, EMULSION INTRAVENOUS at 12:19

## 2025-05-30 RX ADMIN — SODIUM CHLORIDE, POTASSIUM CHLORIDE, SODIUM LACTATE AND CALCIUM CHLORIDE: 600; 310; 30; 20 INJECTION, SOLUTION INTRAVENOUS at 12:12

## 2025-05-30 RX ADMIN — LIDOCAINE HYDROCHLORIDE 80 MG: 10 INJECTION, SOLUTION INFILTRATION; PERINEURAL at 12:19

## 2025-05-30 RX ADMIN — SODIUM CHLORIDE, PRESERVATIVE FREE 40 MG: 5 INJECTION INTRAVENOUS at 06:40

## 2025-05-30 ASSESSMENT — LIFESTYLE VARIABLES: SMOKING_STATUS: 0

## 2025-05-30 NOTE — PROCEDURES
[facility]              [address] [place] [address]                             PROCEDURE NOTE      PATIENT NAME: ALEJANDRO TURPIN           : 1955  MED REC NO: [id number]                          ROOM: 0312  ACCOUNT NO: [id number]                       ADMIT DATE: 2025  PROVIDER: Virgil Rodriguez MD      DATE OF PROCEDURE:  2025    SURGEON:  Virgil Rodriguez MD    PROCEDURE PERFORMED:  Diagnostic esophagogastroduodenoscopy.    INDICATIONS:  69-year-old male presented with signs and symptoms of GI bleeding with a fall in hemoglobin to 8.5 g.  There has been NSAID use with Celebrex and other medications.  EGD is done to evaluate for etiology of GI bleeding to help further guide treatment.    PREMEDICATION:  Propofol per anesthetist.    DESCRIPTION OF PROCEDURE:  The Olympus video endoscope was passed by mouth.  The esophagus had a normal mucosal lining.  The GE junction was located at 41 cm from the incisor teeth.  There was a minimal sliding-type hiatal hernia, which was uncomplicated.  Stomach was insufflated with air and all gastric surfaces appeared normal with no gastric ulceration, erosions, or AVM.  The duodenal bulb and second portion of duodenum were also normal.  No biopsies were indicated.  Scope was removed from the patient.  The patient tolerated the procedure well.    ESTIMATED BLOOD LOSS:  None.    FINDINGS:  Normal esophagus, stomach, and duodenum.  No NSAID-induced injury.    PLAN:  Advance diet as tolerated.  Continue to monitor H and H and stool hemoccults as outpatient.  Recommend followup in the GI Clinic within 1 month.          VIRGIL RODRIGUEZ MD      D:  2025 13:47:07     T:  2025 17:31:23     CWS/AQS  Job #:  [id number]     Doc#:  [id number]

## 2025-05-30 NOTE — ANESTHESIA PRE PROCEDURE
ARTHRITIS PAIN) 650 MG extended release tablet Take 1 tablet by mouth 3 times daily   Yes Esther Quintana MD   Cyanocobalamin (B-12) 2500 MCG TABS Take 1 tablet by mouth Daily   Yes Esther Quintana MD   methylcellulose (CITRUCEL) 500 MG TABS tablet Take 1 tablet by mouth 2 times daily   Yes Esther Quintana MD   omeprazole (PRILOSEC) 10 MG capsule Take 2 capsules by mouth daily   Yes Esther Quintana MD   aspirin (ASPIRIN CHILDRENS) 81 MG chewable tablet Take 1 tablet by mouth in the morning and at bedtime  Patient not taking: Reported on 5/29/2025 8/8/24   Stevenson Botello MD   cyclobenzaprine (FLEXERIL) 10 MG tablet Take 1 tablet by mouth 3 times daily as needed for Muscle spasms  Patient not taking: Reported on 5/29/2025    Esther Quintana MD   docusate sodium (COLACE) 100 MG capsule Take 1 capsule by mouth 2 times daily as needed for Constipation  Patient not taking: Reported on 5/29/2025    Esther Quintana MD       Current medications:    Current Facility-Administered Medications   Medication Dose Route Frequency Provider Last Rate Last Admin    sodium chloride flush 0.9 % injection 5-40 mL  5-40 mL IntraVENous 2 times per day Susan Smiley APRN - CNP        sodium chloride flush 0.9 % injection 5-40 mL  5-40 mL IntraVENous PRN Susan Smiley APRN - CNP        0.9 % sodium chloride infusion   IntraVENous PRN Susna Smiley APRN - CNP        ondansetron (ZOFRAN-ODT) disintegrating tablet 4 mg  4 mg Oral Q8H PRN Susan Smiley APRN - CNP        Or    ondansetron (ZOFRAN) injection 4 mg  4 mg IntraVENous Q6H PRN Susan Smiley APRN - CNP        acetaminophen (TYLENOL) tablet 650 mg  650 mg Oral Q6H PRN Susan Smiley APRN - CNP        Or    acetaminophen (TYLENOL) suppository 650 mg  650 mg Rectal Q6H PRN Susan Smiley APRN - CNP        0.9 % sodium chloride infusion   IntraVENous Continuous Susan Smiley APRN -  mL/hr at 05/29/25 1803 New Bag at

## 2025-05-30 NOTE — CONSULTS
[address] [place] [address]                              CONSULTATION      PATIENT NAME: ALEJANDRO TURPIN           : 1955  MED REC NO: [id number]                          ROOM: 0312  ACCOUNT NO: [id number]                       ADMIT DATE: 2025  PROVIDER: Virgil Rodriguez MD    GI CONSULTATION    CONSULT DATE: 2025      ASSESSMENT:    1. Gastrointestinal bleed manifested by melena suggesting an upper gastrointestinal source of bleeding with a fall in hemoglobin from baseline 10.7 down to 8.5.  There has been NSAID use, which may be a contributing factor to suspected ulcerative gastritis or ulcer disease.  2. History of diverticulitis with bleeding on 2022, with CAT scan documenting thickening and inflammation of the descending colon and sigmoid without perforation or abscess.  3. History of iron deficiency.    RECOMMENDATIONS:    1. Protonix IV.  2. Monitor H and H.  3. EGD to evaluate for suspected upper GI source of bleeding.  4. Consider treatment with Cipro and Flagyl orally for possible subacute diverticulitis.   5. Other diagnostic and therapeutic recommendations are contingent on the findings and hospital course.    HISTORY OF PRESENT ILLNESS:  This 69-year-old male has a background history of hypertension and was previously treated for bleeding diverticulitis 3 years ago.  The patient has passed 4 dark bowel movements. Prior to admission, there was no bright red blood.  He denies any abdominal pain, nausea, vomiting, heartburn, or hematemesis.  He has been taking NSAIDs. Bowel habit has previously been normal with no diarrheal illness.  There is no background history of spiking fever, chills, or weight loss.    Admission labs include WBC 7.7, hemoglobin 10.7 g, falling to 8.5 g since admission.  Normal CMP, and repeat CAT scan of the abdomen reporting questionable mild inflammatory stranding adjacent to the  sigmoid colon diverticular disease.  There is no abscess or free intraperitoneal gas.  More importantly, there is no suspicion of any colon mass.    PAST MEDICAL HISTORY:  Arthritis, COVID, hypertension, iron deficiency, and knee pain.    PAST SURGICAL HISTORY:  Left knee replacement in 2016, right total knee on 08/08/2024.  No prior abdominal surgeries.  Colonoscopy done in 2022.    MEDICATIONS:  Current maintenance medications, see admission medication reconciliation list.    ALLERGIES TO MEDICATIONS:  None.      SOCIAL HISTORY:  The patient lives at home.  No history of habits.    FAMILY HISTORY:  Noncontributory.    PHYSICAL EXAMINATION:  VITAL SIGNS:  Afebrile.  Weight 175 pounds.  Vital signs stable.  No tachycardia.  HEENT:  Normocephalic.  PERRLA.  EOMI.  Normal buccal mucosa.  NECK: Supple.  LUNGS: Clear.  CARDIAC: No cardiac gallop or murmur.  ABDOMEN: Minimal abdominal tenderness, which is nonlocalizing.  Normal bowel sounds.  No organomegaly.  RECTAL: Stool frankly heme-positive.    The procedure of EGD has been discussed with the patient including indication, alternatives, and risks.          IRVING LÓPEZ MD      D:  05/30/2025 09:37:49     T:  05/30/2025 10:09:46     CWS/DANIEL  Job #:  [id number]     Doc#:  [id number]

## 2025-05-30 NOTE — DISCHARGE SUMMARY
Discharge Summary    Domenic Molina  :  1955  MRN:  [id number]    Admit date:  2025  Discharge date: 2025     Admitting Physician:  Erick Grey DO    Advance Directive: Full Code    Consults: GI    Primary Care Physician:  Rl Molina MD    Discharge Diagnoses:  Principal Problem (Resolved):    Rectal bleeding  Active Problems:    * No active hospital problems. *  Resolved Problems:    Hematochezia      Significant Diagnostic Studies:   EGD  Result Date: 2025  No dictation     CT ABDOMEN PELVIS W IV CONTRAST Additional Contrast? None  Result Date: 2025  EXAM:  CT OF THE ABDOMEN AND PELVIS WITH CONTRAST  History:  Rectal bleeding  Technique:  5 mm CT of the abdomen and pelvis following intravenous contrast  FINDINGS:  Clear lung bases. No significant liver abnormality. Normal pancreas and spleen.  There is a 1.8 x 1.9 cm right adrenal nodule with density 44 HU.  Normal left adrenal gland.  Small hiatus hernia without complication.  Normal appendix. Bowel loops demonstrate normal caliber. No inflamatory change seen in the mesentery or retroperitoneum. Duodenal diverticula without complication. Mild atherosclerotic calcification of the aorta without aneurysm.  Patent large visceral arteries.  Colonic diverticula of the sigmoid.  Question inflammatory stranding adjacent to the proximal sigmoid.  No obvious offending diverticula.  No contrast extravasation seen in the distal colon.  Normal pelvic genitourinary structures.  Normal rectum and anal verge.  No acute findings of the skeleton.      Impression: 1. Colonic diverticula sigmoid.  Question mild inflammatory stranding adjacent to the sigmoid.  No abscess or free intraperitoneal gas.  No central offending diverticula is seen.  Possible mild diverticulitis or prior diverticulitis. 2.  No bowel or urinary obstruction 3.  Indeterminate right adrenal nodule 1.9 cm.  In the absence of prior imaging and prior cancer history, most

## 2025-05-30 NOTE — BRIEF OP NOTE
Brief Postoperative Note      Patient: Domenic Molina  YOB: 1955  MRN: [id number]    Date of Procedure: 5/30/2025    Pre-Op Diagnosis Codes:      * GI bleed [K92.2]    Post-Op Diagnosis: normal EGD       Procedure(s):  ESOPHAGOGASTRODUODENOSCOPY DIAGNOSTIC ONLY    Surgeon(s):  Virgil Rodriguez MD    Assistant:  * No surgical staff found *    Anesthesia: Monitor Anesthesia Care    Estimated Blood Loss (mL): none    Complications: none    Specimens:   * No specimens in log *    Implants:  * No implants in log *      Drains: * No LDAs found *    Findings:  Infection Present At Time Of Surgery (PATOS) (choose all levels that have infection present):  No infection present  Other Findings: none  Plan: advance diet, no further inpatient work-up, follow-up in GI clinic 1 mo, observe diverticular disease.  Electronically signed by Virgil Rodriguez MD on 5/30/2025 at 12:36 PM

## 2025-05-30 NOTE — ANESTHESIA POSTPROCEDURE EVALUATION
Department of Anesthesiology  Postprocedure Note    Patient: Domenic Molina  MRN: [id number]  YOB: 1955  Date of evaluation: 5/30/2025    Procedure Summary       Date: 05/30/25 Room / Location: [place] [address] [address] / [facility]    Anesthesia Start: 1212 Anesthesia Stop: 1225    Procedure: ESOPHAGOGASTRODUODENOSCOPY DIAGNOSTIC ONLY Diagnosis:       GI bleed      (GI bleed [K92.2])    Surgeons: Virgil Rodriguez MD Responsible Provider: Franco Leonard APRN - CRNA    Anesthesia Type: general, TIVA ASA Status: 3            Anesthesia Type: No value filed.    Marianna Phase I:      Marianna Phase II:      Anesthesia Post Evaluation    Patient location during evaluation: PACU  Patient participation: complete - patient participated  Level of consciousness: sleepy but conscious  Pain score: 0  Airway patency: patent  Nausea & Vomiting: no nausea and no vomiting  Cardiovascular status: hemodynamically stable and blood pressure returned to baseline  Respiratory status: acceptable, spontaneous ventilation, nonlabored ventilation and room air  Hydration status: stable  Comments: BP (!) 111/58   Pulse 70   Temp 96.8 °F (36 °C) (Oral)   Resp 15   Ht 1.702 m (5' 7.01\")   Wt 79.4 kg (175 lb)   SpO2 98%   BMI 27.40 kg/m²     Pain management: adequate    No notable events documented.

## 2025-06-11 NOTE — PROGRESS NOTES
Physician Progress Note      PATIENT:               ALEJANDRO TURPIN  [facility] #:                  [id number]  :                       1955  ADMIT DATE:       2025 12:51 PM  DISCH DATE:        2025 4:33 PM  RESPONDING  PROVIDER #:        Erick Grey DO          QUERY TEXT:    Diverticulitis was documented in the medical record CN  by   Gastroenterology Virgil Rodriguez.  The diagnosis was not noted in   subsequent documentation.  Please clarify the status of this condition.    The clinical indicators include:  -Age 69 yrs, male, Melena,    - \"Pt presents with complaints of rectal bleeding and found to have upper GI   source of bleeding, history of diverticulitis with diverticular bleeds comes   to ED complaining of rectal bleeding.\" (HP  by Susan Moody).    -\"Gastrointestinal bleed manifested by melena suggesting an upper   gastrointestinal source of bleeding, EGD to evaluate for suspected upper GI   source of bleeding. Consider treatment with Cipro and Flagyl orally for   possible subacute diverticulitis. History of diverticulitis with bleeding on   2022, with CAT scan documenting thickening and inflammation of the   descending colon and sigmoid without perforation or abscess.\" (CN  by GE   Virgil Rodriguez)    -\" Rectal bleeding, Hematochezia. CT ABDOMEN: Possible mild diverticulitis or   prior diverticulitis Colonic diverticula sigmoid.  he underwent EGD. There   was no evidence of any acute pathology noted.\" (DS  by Erick Pulliam)    -Treated with Cipro and Flagyl -(from 'MAR' on ).    Thanks,  [facility].  Options provided:  -- Diverticulitis confirmed after study  -- Diverticulitis ruled out after study  -- Other - I will add my own diagnosis  -- Disagree - Not applicable / Not valid  -- Disagree - Clinically unable to determine / Unknown  -- Refer to Clinical Documentation Reviewer    PROVIDER RESPONSE  TEXT:    Diverticulitis confirmed after study.    Query created by: Samanta Jacinto on 6/5/2025 7:50 AM      Electronically signed by:  Erick Grey DO 6/11/2025 1:33 PM

## (undated) DEVICE — PIN FIX STERILE L80MM DIA3.2MM FLUT CAS

## (undated) DEVICE — SHEET,DRAPE,53X77,STERILE: Brand: MEDLINE

## (undated) DEVICE — PK TURNOVER RM ADV

## (undated) DEVICE — SENSR O2 OXIMAX FNGR A/ 18IN NONSTR

## (undated) DEVICE — THE CHANNEL CLEANING BRUSH IS A NYLON FLEXI BRUSH ATTACHED TO A FLEXIBLE PLASTIC SHEATH DESIGNED TO SAFELY REMOVE DEBRIS FROM FLEXIBLE ENDOSCOPES.

## (undated) DEVICE — Device: Brand: DEFENDO AIR/WATER/SUCTION AND BIOPSY VALVE

## (undated) DEVICE — NEPTUNE E-SEP SMOKE EVACUATION PENCIL, COATED, 70MM BLADE, ROCKER SWITCH: Brand: NEPTUNE E-SEP

## (undated) DEVICE — GLV SURG BIOGEL M LTX PF 7 1/2

## (undated) DEVICE — STERILE COTTON BALLS LARGE 5/P: Brand: MEDLINE

## (undated) DEVICE — TUBE ET 7.5MM NSL ORAL BASIC CUF INTMED MURPHY EYE RADPQ

## (undated) DEVICE — DUAL CUT SAGITTAL BLADE

## (undated) DEVICE — ENDO KIT,LOURDES HOSPITAL: Brand: MEDLINE INDUSTRIES, INC.

## (undated) DEVICE — RECIPROCATING BLADE DOUBLE SIDE (74.0 X 0.77MM)

## (undated) DEVICE — TBG SMPL FLTR LINE NASL 02/C02 A/ BX/100

## (undated) DEVICE — MASK,OXYGEN,MED CONC,ADLT,7' TUB, UC: Brand: PENDING

## (undated) DEVICE — YANKAUER,BULB TIP WITH VENT: Brand: ARGYLE

## (undated) DEVICE — SUTURE VICRYL + 3-0 L27IN ABSRB UD PS-2 L19MM 3/8 CIR PRIM VCP427H

## (undated) DEVICE — ANTIBACTERIAL UNDYED BRAIDED (POLYGLACTIN 910), SYNTHETIC ABSORBABLE SUTURE: Brand: COATED VICRYL

## (undated) DEVICE — APPL CHLORAPREP W/TINT 26ML ORNG

## (undated) DEVICE — SUTURE VICRYL ABSRB BRAID COAT UD CP NO 2 27IN  J195H

## (undated) DEVICE — PAD MINOR UNIVERSAL: Brand: MEDLINE INDUSTRIES, INC.

## (undated) DEVICE — SOLUTION IRRIG 3000ML 0.9% SOD CHL USP UROMATIC PLAS CONT

## (undated) DEVICE — SURGICAL PROCEDURE PACK KNEE TOT DBD CDS LOURDES HOSP LF

## (undated) DEVICE — ADHESIVE SKIN CLOSURE WND 8.661X1.5 IN 22 CM LIQUIBAND SECUR

## (undated) DEVICE — DRESSING FOAM W4XL12IN SIL RECT ADH WTRPRF FLM BK W/ BORD

## (undated) DEVICE — NDL HYPO PRECISIONGLIDE REG 22G 1 1/2

## (undated) DEVICE — GLOVE SURG SZ 85 L12IN FNGR THK79MIL GRN LTX FREE

## (undated) DEVICE — ADHS LIQ MASTISOL 2/3ML

## (undated) DEVICE — DRSNG SURESITE WNDW 4X4.5

## (undated) DEVICE — PAD GRND REM POLYHESIVE A/ DISP

## (undated) DEVICE — CEMENT MIXING SYSTEM WITH FEMORAL BREAKWAY NOZZLE: Brand: REVOLUTION

## (undated) DEVICE — CURAVIEW LED LARYNGOSCOPE BLADE & HANDLE,DISPOSABLE,MILLER 3: Brand: CURAPLEX

## (undated) DEVICE — GLOVE SURG SZ 85 CRM LTX FREE POLYISOPRENE POLYMER BEAD ANTI

## (undated) DEVICE — INSTRUMENT KIT ORTHOPEDIC KNEE NAVITRACK

## (undated) DEVICE — BANDAGE COMPR W6INXL15FT BGE E SGL LAYERED CLP CLSR

## (undated) DEVICE — PIN FIX STERILE L150MM DIA3.2MM FLUT

## (undated) DEVICE — CUFF,BP,DISP,1 TUBE,ADULT,HP: Brand: MEDLINE

## (undated) DEVICE — SPNG GZ 2S 2X2 8PLY STRL PK/2

## (undated) DEVICE — SYR LL TP 10ML STRL

## (undated) DEVICE — 3M™ STERI-STRIP™ REINFORCED ADHESIVE SKIN CLOSURES, R1546, 1/4 IN X 4 IN (6 MM X 100 MM), 10 STRIPS/ENVELOPE: Brand: 3M™ STERI-STRIP™

## (undated) DEVICE — SUTURE VICRYL + SZ 2-0 L36IN ABSRB UD L36MM CT-1 1/2 CIR VCP945H

## (undated) DEVICE — DRAPE ROSA RBTC UNT 20 DROP

## (undated) DEVICE — CONMED SCOPE SAVER BITE BLOCK, 20X27 MM: Brand: SCOPE SAVER

## (undated) DEVICE — HANDPIECE SET WITH COAXIAL MULTI-ORIFICE TIP AND SUCTION TUBE: Brand: INTERPULSE

## (undated) DEVICE — FRCP BX RADJAW4 NDL 2.8 240 STD OG